# Patient Record
Sex: FEMALE | Race: WHITE | NOT HISPANIC OR LATINO | Employment: UNEMPLOYED | URBAN - NONMETROPOLITAN AREA
[De-identification: names, ages, dates, MRNs, and addresses within clinical notes are randomized per-mention and may not be internally consistent; named-entity substitution may affect disease eponyms.]

---
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
## 2020-01-03 ENCOUNTER — PRENATAL OFFICE VISIT (OUTPATIENT)
Dept: OBGYN | Facility: OTHER | Age: 20
End: 2020-01-03
Attending: OBSTETRICS & GYNECOLOGY
Payer: COMMERCIAL

## 2020-01-03 VITALS
SYSTOLIC BLOOD PRESSURE: 120 MMHG | DIASTOLIC BLOOD PRESSURE: 60 MMHG | BODY MASS INDEX: 30.67 KG/M2 | WEIGHT: 165 LBS | HEART RATE: 76 BPM

## 2020-01-03 DIAGNOSIS — Z34.03 SUPERVISION OF NORMAL FIRST TEEN PREGNANCY IN THIRD TRIMESTER: Primary | ICD-10-CM

## 2020-01-03 PROCEDURE — 87081 CULTURE SCREEN ONLY: CPT | Mod: ZL | Performed by: OBSTETRICS & GYNECOLOGY

## 2020-01-03 PROCEDURE — 99207 ZZC OB VISIT-NO CHARGE - GICH ONLY: CPT | Performed by: OBSTETRICS & GYNECOLOGY

## 2020-01-03 ASSESSMENT — PAIN SCALES - GENERAL: PAINLEVEL: NO PAIN (0)

## 2020-01-03 NOTE — NURSING NOTE
Chief Complaint   Patient presents with     Prenatal Care     36w4D        Medication Reconciliation: completed   Nancy Cortes LPN  1/3/2020 3:23 PM

## 2020-01-03 NOTE — PROGRESS NOTES
Return OB Visit    S: Patient is feeling well. No ctx, VB or LOF. +FM    O: /60 (BP Location: Right arm, Patient Position: Sitting, Cuff Size: Adult Regular)   Pulse 76   Wt 74.8 kg (165 lb)   LMP 2019 (Exact Date)   Breastfeeding No   BMI 30.67 kg/m    Gen: Well-appearing, NAD  See OB Flowsheet    A/P:  Bebe Carl is a 19 year old  at 36w4d by LMP c/w 8w4d US, here for return OB visit.  Bipolar: mood stable, off meds  Trichomonas: s/p treatment  Plans breastfeeding, epidural, Nexplanon  Nausea: zofran     PNC:  Rh positive, Rubella equivocal,   Genetics: normal CF screen. 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 Declines genetic counseling referral. Quad screen normal   Imaging: dating US at 8w4d, anatomy survey normal  Immunizations: s/p flu, Tdap  RTC weekly until delivery    Mandie Shook MD  OB/GYN  1/3/2020 3:21 PM      Danish Pinto

## 2020-01-06 NOTE — DISCHARGE SUMMARY
[facility] Labor and Delivery   Discharge Summary      Bebe Carl MRN# [id number]   Age: 19 year old YOB: 2000      Date of Admission:                      2019     Primary care provider: SAMEER Shook           Chief Complaint:   Bebe Carl is a 19 year old  at 35w1d admitted for vomiting, dehydration, fevers, and contractions.           Physical Exam:      Patient Vitals for the past 8 hrs:    BP Temp Temp src Pulse Resp SpO2   19 0231 110/58 99  F (37.2  C) Temporal 110 16 --   19 0230 -- -- -- -- -- 98 %      Gen: alert, in no acute distress  CV: RRR, no murmurs  Resp: clear to auscultation bilaterally  Abd: gravid, non-tender  Ext: trace edema bilaterally      Cervix: 0-1/0-10%/posterior (per RN)  Membranes: intact     Fetal Heart Rate Tracing: variables absent, Tier 1 (normal)  Tocometer: external monitor- initially contractions q3-5, by the morning, they had dissipated          Assessment/Plan:   Bebe Carl is a 19 year old  at 35w1d admitted with acute gastroenteritis and dehydration. Category I strip. Intact membranes. Occasional ctx, no cervical dilation. GBS -.      -IV fluid rehydration  -IV Zofran  -tylenol prn for fever  -observe overnight     By morning, she felt much better. She had sufficient urine output and tolerated a regular breakfast. Ctx stopped. She was prescribed PO Zofran prn for home and her OB clinic appointment for today was rescheduled for next week.      Carmen Hou MD  Family Practice/OB

## 2020-01-07 LAB
BACTERIA SPEC CULT: NORMAL
SPECIMEN SOURCE: NORMAL

## 2020-01-08 ENCOUNTER — PRENATAL OFFICE VISIT (OUTPATIENT)
Dept: OBGYN | Facility: OTHER | Age: 20
End: 2020-01-08
Attending: OBSTETRICS & GYNECOLOGY
Payer: COMMERCIAL

## 2020-01-08 VITALS
HEART RATE: 82 BPM | WEIGHT: 167.8 LBS | BODY MASS INDEX: 31.19 KG/M2 | DIASTOLIC BLOOD PRESSURE: 62 MMHG | SYSTOLIC BLOOD PRESSURE: 128 MMHG

## 2020-01-08 DIAGNOSIS — Z34.03 SUPERVISION OF NORMAL FIRST TEEN PREGNANCY IN THIRD TRIMESTER: Primary | ICD-10-CM

## 2020-01-08 PROCEDURE — G0463 HOSPITAL OUTPT CLINIC VISIT: HCPCS

## 2020-01-08 PROCEDURE — 99207 ZZC OB VISIT-NO CHARGE - GICH ONLY: CPT | Performed by: OBSTETRICS & GYNECOLOGY

## 2020-01-08 ASSESSMENT — PAIN SCALES - GENERAL: PAINLEVEL: NO PAIN (0)

## 2020-01-08 NOTE — NURSING NOTE
Pt presents for OB at 37W2D. No concerns noted. Lizandro Millan RN, BSN  ....................  1/8/2020   3:24 PM

## 2020-01-08 NOTE — PROGRESS NOTES
Return OB Visit    S: Patient doing well. No ctx, VB or LOF. +FM.    O: /62 (BP Location: Right arm, Patient Position: Sitting, Cuff Size: Adult Regular)   Pulse 82   Wt 76.1 kg (167 lb 12.8 oz)   LMP 2019 (Exact Date)   BMI 31.19 kg/m    Gen: Well-appearing, NAD  See OB Flowsheet    A/P:  Bebe Carl is a 19 year old  at 37w2d by LMP c/w 8w4d US, here for return OB visit.  Bipolar: mood stable, off meds  Trichomonas: s/p treatment  Plans breastfeeding, epidural, Nexplanon  Nausea: zofran     PNC:  Rh positive, Rubella equivocal,   Genetics: normal CF screen. 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 Declines genetic counseling referral. Quad screen normal   Imaging: dating US at 8w4d, anatomy survey normal  Immunizations: s/p flu, Tdap  RTC weekly until delivery    Mandie Shook MD  OB/GYN  2020 3:48 PM

## 2020-01-14 ENCOUNTER — PRENATAL OFFICE VISIT (OUTPATIENT)
Dept: OBGYN | Facility: OTHER | Age: 20
End: 2020-01-14
Attending: OBSTETRICS & GYNECOLOGY
Payer: COMMERCIAL

## 2020-01-14 VITALS
WEIGHT: 167.3 LBS | BODY MASS INDEX: 31.1 KG/M2 | DIASTOLIC BLOOD PRESSURE: 86 MMHG | HEART RATE: 88 BPM | SYSTOLIC BLOOD PRESSURE: 126 MMHG

## 2020-01-14 DIAGNOSIS — Z34.90 NORMAL PREGNANCY, ANTEPARTUM: Primary | ICD-10-CM

## 2020-01-14 PROCEDURE — 99207 ZZC OB VISIT-NO CHARGE - GICH ONLY: CPT | Performed by: OBSTETRICS & GYNECOLOGY

## 2020-01-14 ASSESSMENT — PAIN SCALES - GENERAL: PAINLEVEL: NO PAIN (0)

## 2020-01-14 NOTE — PROGRESS NOTES
CC: Recheck OB visit at 38w1d    HPI: Bebe Carl presents for a routine OB visit now at 38w1d  She has no concerns. Denies cramping, bleeding, normal fetal movement    OB History    Para Term  AB Living   1 0 0 0 0 0   SAB TAB Ectopic Multiple Live Births   0 0 0 0 0      # Outcome Date GA Lbr Alistair/2nd Weight Sex Delivery Anes PTL Lv   1 Current              Current Outpatient Medications   Medication     acetaminophen (TYLENOL) 325 MG tablet     ferrous sulfate (FEROSUL) 325 (65 Fe) MG tablet     ondansetron (ZOFRAN) 4 MG tablet     Prenatal Vit-Fe Fumarate-FA (PRENATAL MULTIVITAMIN W/IRON) 27-0.8 MG tablet     No current facility-administered medications for this visit.          O: /86 (BP Location: Right arm)   Pulse 88   Wt 75.9 kg (167 lb 4.8 oz)   LMP 2019 (Exact Date)   BMI 31.10 kg/m    Body mass index is 31.1 kg/m .  See OB flow sheet  EXAM:  NAD  EFW 8 lbs.  FHT: 140 bpm  Declines cx exam    No results found for any visits on 20.    A/P: 38w1d gestation      Bipolar: mood stable, off meds  Trichomonas: s/p treatment  Plans breastfeeding, epidural, Nexplanon  Nausea: zofran     PNC:  Rh positive, Rubella equivocal,   Genetics: normal CF screen. 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 Declines genetic counseling referral. Quad screen normal   Imaging: dating US at 8w4d, anatomy survey normal  Immunizations: s/p flu, Tdap  RTC weekly until delivery    Lucas Mohamud MD FACOG  3:43 PM 2020

## 2020-01-22 ENCOUNTER — PRENATAL OFFICE VISIT (OUTPATIENT)
Dept: OBGYN | Facility: OTHER | Age: 20
End: 2020-01-22
Attending: OBSTETRICS & GYNECOLOGY
Payer: COMMERCIAL

## 2020-01-22 VITALS
WEIGHT: 167.3 LBS | OXYGEN SATURATION: 98 % | DIASTOLIC BLOOD PRESSURE: 70 MMHG | BODY MASS INDEX: 31.1 KG/M2 | SYSTOLIC BLOOD PRESSURE: 118 MMHG

## 2020-01-22 DIAGNOSIS — Z34.03 SUPERVISION OF NORMAL FIRST TEEN PREGNANCY IN THIRD TRIMESTER: Primary | ICD-10-CM

## 2020-01-22 PROCEDURE — 99207 ZZC OB VISIT-NO CHARGE - GICH ONLY: CPT | Performed by: OBSTETRICS & GYNECOLOGY

## 2020-01-22 ASSESSMENT — PAIN SCALES - GENERAL: PAINLEVEL: NO PAIN (0)

## 2020-01-22 NOTE — NURSING NOTE
"Chief Complaint   Patient presents with     Prenatal Care     39w2d       Initial /70   Wt 75.9 kg (167 lb 4.8 oz)   LMP 04/22/2019 (Exact Date)   SpO2 98%   Breastfeeding No   BMI 31.10 kg/m   Estimated body mass index is 31.1 kg/m  as calculated from the following:    Height as of 12/22/19: 1.562 m (5' 1.5\").    Weight as of this encounter: 75.9 kg (167 lb 4.8 oz).  Medication Reconciliation: complete    Nanette Pinto, LIZ  "

## 2020-01-22 NOTE — PROGRESS NOTES
Return OB Visit    S: Patient is having some questions. No VB or LOF. +FM    O: /70   Wt 75.9 kg (167 lb 4.8 oz)   LMP 2019 (Exact Date)   SpO2 98%   Breastfeeding No   BMI 31.10 kg/m    Gen: Well-appearing, NAD  See OB Flowsheet    A/P:  Bebe Carl is a 19 year old  at 39w2d by LMP c/w 8w4d US, here for return OB visit.  Bipolar: mood stable, off meds  Trichomonas: s/p treatment  Plans breastfeeding, epidural, Nexplanon  Nausea: zofran     PNC:  Rh positive, Rubella equivocal,   Genetics: normal CF screen. 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 Declines genetic counseling referral. Quad screen normal   Imaging: dating US at 8w4d, anatomy survey normal  Immunizations: s/p flu, Tdap  RTC weekly until delivery    Mandie Shook MD  OB/GYN  2020 3:43 PM

## 2020-01-26 ENCOUNTER — ANESTHESIA (OUTPATIENT)
Dept: OBGYN | Facility: OTHER | Age: 20
End: 2020-01-26

## 2020-01-26 ENCOUNTER — ANESTHESIA EVENT (OUTPATIENT)
Dept: OBGYN | Facility: OTHER | Age: 20
End: 2020-01-26
Payer: COMMERCIAL

## 2020-01-26 ENCOUNTER — ANESTHESIA EVENT (OUTPATIENT)
Dept: OBGYN | Facility: OTHER | Age: 20
End: 2020-01-26

## 2020-01-26 ENCOUNTER — ANESTHESIA (OUTPATIENT)
Dept: OBGYN | Facility: OTHER | Age: 20
End: 2020-01-26
Payer: COMMERCIAL

## 2020-01-26 ENCOUNTER — HOSPITAL ENCOUNTER (INPATIENT)
Facility: OTHER | Age: 20
LOS: 2 days | Discharge: HOME | End: 2020-01-28
Attending: OBSTETRICS & GYNECOLOGY | Admitting: OBSTETRICS & GYNECOLOGY
Payer: COMMERCIAL

## 2020-01-26 DIAGNOSIS — D62 ANEMIA DUE TO BLOOD LOSS, ACUTE: Primary | ICD-10-CM

## 2020-01-26 PROBLEM — Z36.89 ENCOUNTER FOR TRIAGE IN PREGNANT PATIENT: Status: ACTIVE | Noted: 2019-10-01

## 2020-01-26 LAB
A1 MICROGLOB PLACENTAL VAG QL: POSITIVE
ALBUMIN UR-MCNC: NEGATIVE MG/DL
AMPHETAMINES UR QL SCN: NOT DETECTED
APPEARANCE UR: CLEAR
BACTERIA #/AREA URNS HPF: ABNORMAL /HPF
BARBITURATES UR QL: NOT DETECTED
BASOPHILS # BLD AUTO: 0 10E9/L (ref 0–0.2)
BASOPHILS NFR BLD AUTO: 0.2 %
BENZODIAZ UR QL: NOT DETECTED
BILIRUB UR QL STRIP: NEGATIVE
BUPRENORPHINE UR QL: NOT DETECTED NG/ML
CANNABINOIDS UR QL: NOT DETECTED NG/ML
COCAINE UR QL: NOT DETECTED
COLOR UR AUTO: YELLOW
D-METHAMPHET UR QL: NOT DETECTED NG/ML
DIFFERENTIAL METHOD BLD: ABNORMAL
EOSINOPHIL # BLD AUTO: 0.2 10E9/L (ref 0–0.7)
EOSINOPHIL NFR BLD AUTO: 1.2 %
ERYTHROCYTE [DISTWIDTH] IN BLOOD BY AUTOMATED COUNT: 13.4 % (ref 10–15)
ERYTHROCYTE [DISTWIDTH] IN BLOOD BY AUTOMATED COUNT: 13.5 % (ref 10–15)
GLUCOSE UR STRIP-MCNC: NEGATIVE MG/DL
HCT VFR BLD AUTO: 27.3 % (ref 35–47)
HCT VFR BLD AUTO: 33.9 % (ref 35–47)
HGB BLD-MCNC: 11.9 G/DL (ref 11.7–15.7)
HGB BLD-MCNC: 9.2 G/DL (ref 11.7–15.7)
HGB UR QL STRIP: ABNORMAL
IMM GRANULOCYTES # BLD: 0.1 10E9/L (ref 0–0.4)
IMM GRANULOCYTES NFR BLD: 0.4 %
KETONES UR STRIP-MCNC: NEGATIVE MG/DL
LEUKOCYTE ESTERASE UR QL STRIP: ABNORMAL
LYMPHOCYTES # BLD AUTO: 1.5 10E9/L (ref 0.8–5.3)
LYMPHOCYTES NFR BLD AUTO: 8.6 %
MCH RBC QN AUTO: 31 PG (ref 26.5–33)
MCH RBC QN AUTO: 31.6 PG (ref 26.5–33)
MCHC RBC AUTO-ENTMCNC: 33.7 G/DL (ref 31.5–36.5)
MCHC RBC AUTO-ENTMCNC: 35.1 G/DL (ref 31.5–36.5)
MCV RBC AUTO: 90 FL (ref 78–100)
MCV RBC AUTO: 92 FL (ref 78–100)
METHADONE UR QL SCN: NOT DETECTED
MONOCYTES # BLD AUTO: 0.8 10E9/L (ref 0–1.3)
MONOCYTES NFR BLD AUTO: 4.4 %
MUCOUS THREADS #/AREA URNS LPF: PRESENT /LPF
NEUTROPHILS # BLD AUTO: 14.4 10E9/L (ref 1.6–8.3)
NEUTROPHILS NFR BLD AUTO: 85.2 %
NITRATE UR QL: NEGATIVE
OPIATES UR QL SCN: NOT DETECTED
OXYCODONE UR QL: NOT DETECTED NG/ML
PCP UR QL SCN: NOT DETECTED
PH UR STRIP: 6.5 PH (ref 5–7)
PLATELET # BLD AUTO: 275 10E9/L (ref 150–450)
PLATELET # BLD AUTO: 309 10E9/L (ref 150–450)
PROPOXYPH UR QL: NOT DETECTED NG/ML
RBC # BLD AUTO: 2.97 10E12/L (ref 3.8–5.2)
RBC # BLD AUTO: 3.77 10E12/L (ref 3.8–5.2)
RBC #/AREA URNS AUTO: >182 /HPF
SOURCE: ABNORMAL
SP GR UR STRIP: 1.01 (ref 1–1.03)
SQUAMOUS #/AREA URNS AUTO: 8 /HPF (ref 0–1)
TRICYCLICS UR QL SCN: NOT DETECTED NG/ML
UROBILINOGEN UR STRIP-MCNC: NORMAL MG/DL (ref 0–2)
WBC # BLD AUTO: 17 10E9/L (ref 4–11)
WBC # BLD AUTO: 25.4 10E9/L (ref 4–11)
WBC #/AREA URNS AUTO: 6 /HPF

## 2020-01-26 PROCEDURE — 86901 BLOOD TYPING SEROLOGIC RH(D): CPT | Performed by: OBSTETRICS & GYNECOLOGY

## 2020-01-26 PROCEDURE — 80307 DRUG TEST PRSMV CHEM ANLYZR: CPT | Performed by: OBSTETRICS & GYNECOLOGY

## 2020-01-26 PROCEDURE — 25000125 ZZHC RX 250: Performed by: OBSTETRICS & GYNECOLOGY

## 2020-01-26 PROCEDURE — 25000128 H RX IP 250 OP 636: Performed by: NURSE ANESTHETIST, CERTIFIED REGISTERED

## 2020-01-26 PROCEDURE — 25000132 ZZH RX MED GY IP 250 OP 250 PS 637: Performed by: OBSTETRICS & GYNECOLOGY

## 2020-01-26 PROCEDURE — 84112 EVAL AMNIOTIC FLUID PROTEIN: CPT | Performed by: OBSTETRICS & GYNECOLOGY

## 2020-01-26 PROCEDURE — 25000125 ZZHC RX 250: Performed by: NURSE ANESTHETIST, CERTIFIED REGISTERED

## 2020-01-26 PROCEDURE — 0KQM0ZZ REPAIR PERINEUM MUSCLE, OPEN APPROACH: ICD-10-PCS | Performed by: OBSTETRICS & GYNECOLOGY

## 2020-01-26 PROCEDURE — 25000132 ZZH RX MED GY IP 250 OP 250 PS 637: Performed by: NURSE ANESTHETIST, CERTIFIED REGISTERED

## 2020-01-26 PROCEDURE — 25800030 ZZH RX IP 258 OP 636: Performed by: OBSTETRICS & GYNECOLOGY

## 2020-01-26 PROCEDURE — 37000011 ZZH ANESTHESIA WARD SERVICE

## 2020-01-26 PROCEDURE — 85027 COMPLETE CBC AUTOMATED: CPT | Performed by: OBSTETRICS & GYNECOLOGY

## 2020-01-26 PROCEDURE — 86920 COMPATIBILITY TEST SPIN: CPT | Performed by: OBSTETRICS & GYNECOLOGY

## 2020-01-26 PROCEDURE — 86780 TREPONEMA PALLIDUM: CPT | Performed by: OBSTETRICS & GYNECOLOGY

## 2020-01-26 PROCEDURE — 25000128 H RX IP 250 OP 636: Performed by: OBSTETRICS & GYNECOLOGY

## 2020-01-26 PROCEDURE — 81001 URINALYSIS AUTO W/SCOPE: CPT | Performed by: OBSTETRICS & GYNECOLOGY

## 2020-01-26 PROCEDURE — G0463 HOSPITAL OUTPT CLINIC VISIT: HCPCS | Mod: 25

## 2020-01-26 PROCEDURE — 86850 RBC ANTIBODY SCREEN: CPT | Performed by: OBSTETRICS & GYNECOLOGY

## 2020-01-26 PROCEDURE — 85025 COMPLETE CBC W/AUTO DIFF WBC: CPT | Performed by: OBSTETRICS & GYNECOLOGY

## 2020-01-26 PROCEDURE — 25800030 ZZH RX IP 258 OP 636: Performed by: NURSE ANESTHETIST, CERTIFIED REGISTERED

## 2020-01-26 PROCEDURE — 36415 COLL VENOUS BLD VENIPUNCTURE: CPT | Performed by: OBSTETRICS & GYNECOLOGY

## 2020-01-26 PROCEDURE — 59400 OBSTETRICAL CARE: CPT | Performed by: OBSTETRICS & GYNECOLOGY

## 2020-01-26 PROCEDURE — 12000000 ZZH R&B MED SURG/OB

## 2020-01-26 PROCEDURE — 86900 BLOOD TYPING SEROLOGIC ABO: CPT | Performed by: OBSTETRICS & GYNECOLOGY

## 2020-01-26 RX ORDER — OXYTOCIN 10 [USP'U]/ML
10 INJECTION, SOLUTION INTRAMUSCULAR; INTRAVENOUS
Status: DISCONTINUED | OUTPATIENT
Start: 2020-01-26 | End: 2020-01-28 | Stop reason: HOSPADM

## 2020-01-26 RX ORDER — CARBOPROST TROMETHAMINE 250 UG/ML
250 INJECTION, SOLUTION INTRAMUSCULAR
Status: DISCONTINUED | OUTPATIENT
Start: 2020-01-26 | End: 2020-01-28 | Stop reason: HOSPADM

## 2020-01-26 RX ORDER — NALOXONE HYDROCHLORIDE 0.4 MG/ML
.1-.4 INJECTION, SOLUTION INTRAMUSCULAR; INTRAVENOUS; SUBCUTANEOUS
Status: DISCONTINUED | OUTPATIENT
Start: 2020-01-26 | End: 2020-01-28 | Stop reason: HOSPADM

## 2020-01-26 RX ORDER — SODIUM CHLORIDE, SODIUM LACTATE, POTASSIUM CHLORIDE, CALCIUM CHLORIDE 600; 310; 30; 20 MG/100ML; MG/100ML; MG/100ML; MG/100ML
INJECTION, SOLUTION INTRAVENOUS CONTINUOUS
Status: DISCONTINUED | OUTPATIENT
Start: 2020-01-26 | End: 2020-01-28 | Stop reason: HOSPADM

## 2020-01-26 RX ORDER — BISACODYL 10 MG
10 SUPPOSITORY, RECTAL RECTAL DAILY PRN
Status: DISCONTINUED | OUTPATIENT
Start: 2020-01-28 | End: 2020-01-28 | Stop reason: HOSPADM

## 2020-01-26 RX ORDER — IBUPROFEN 400 MG/1
800 TABLET, FILM COATED ORAL EVERY 6 HOURS PRN
Status: DISCONTINUED | OUTPATIENT
Start: 2020-01-26 | End: 2020-01-28 | Stop reason: HOSPADM

## 2020-01-26 RX ORDER — NALOXONE HYDROCHLORIDE 0.4 MG/ML
.1-.4 INJECTION, SOLUTION INTRAMUSCULAR; INTRAVENOUS; SUBCUTANEOUS
Status: DISCONTINUED | OUTPATIENT
Start: 2020-01-26 | End: 2020-01-26

## 2020-01-26 RX ORDER — METHYLERGONOVINE MALEATE 0.2 MG/ML
200 INJECTION INTRAVENOUS
Status: DISCONTINUED | OUTPATIENT
Start: 2020-01-26 | End: 2020-01-28 | Stop reason: HOSPADM

## 2020-01-26 RX ORDER — LIDOCAINE HYDROCHLORIDE AND EPINEPHRINE 15; 5 MG/ML; UG/ML
3 INJECTION, SOLUTION EPIDURAL
Status: COMPLETED | OUTPATIENT
Start: 2020-01-26 | End: 2020-01-26

## 2020-01-26 RX ORDER — CITRIC ACID/SODIUM CITRATE 334-500MG
30 SOLUTION, ORAL ORAL ONCE
Status: COMPLETED | OUTPATIENT
Start: 2020-01-26 | End: 2020-01-26

## 2020-01-26 RX ORDER — NALOXONE HYDROCHLORIDE 0.4 MG/ML
.1-.4 INJECTION, SOLUTION INTRAMUSCULAR; INTRAVENOUS; SUBCUTANEOUS
Status: DISCONTINUED | OUTPATIENT
Start: 2020-01-26 | End: 2020-01-28

## 2020-01-26 RX ORDER — ACETAMINOPHEN 325 MG/1
650 TABLET ORAL EVERY 4 HOURS PRN
Status: DISCONTINUED | OUTPATIENT
Start: 2020-01-26 | End: 2020-01-28 | Stop reason: HOSPADM

## 2020-01-26 RX ORDER — LIDOCAINE HYDROCHLORIDE 10 MG/ML
INJECTION, SOLUTION INFILTRATION; PERINEURAL PRN
Status: DISCONTINUED | OUTPATIENT
Start: 2020-01-26 | End: 2020-01-26

## 2020-01-26 RX ORDER — FENTANYL/BUPIVACAINE/NS/PF 2-1250MCG
10 PLASTIC BAG, INJECTION (ML) INJECTION CONTINUOUS
Status: DISCONTINUED | OUTPATIENT
Start: 2020-01-26 | End: 2020-01-28 | Stop reason: HOSPADM

## 2020-01-26 RX ORDER — NALBUPHINE HYDROCHLORIDE 10 MG/ML
2.5-5 INJECTION, SOLUTION INTRAMUSCULAR; INTRAVENOUS; SUBCUTANEOUS EVERY 6 HOURS PRN
Status: DISCONTINUED | OUTPATIENT
Start: 2020-01-26 | End: 2020-01-28 | Stop reason: HOSPADM

## 2020-01-26 RX ORDER — IBUPROFEN 400 MG/1
800 TABLET, FILM COATED ORAL
Status: DISCONTINUED | OUTPATIENT
Start: 2020-01-26 | End: 2020-01-28 | Stop reason: HOSPADM

## 2020-01-26 RX ORDER — PHENYLEPHRINE HCL IN 0.9% NACL 1 MG/10 ML
100 SYRINGE (ML) INTRAVENOUS EVERY 5 MIN PRN
Status: DISCONTINUED | OUTPATIENT
Start: 2020-01-26 | End: 2020-01-28 | Stop reason: HOSPADM

## 2020-01-26 RX ORDER — HYDROCORTISONE 2.5 %
CREAM (GRAM) TOPICAL 3 TIMES DAILY PRN
Status: DISCONTINUED | OUTPATIENT
Start: 2020-01-26 | End: 2020-01-28 | Stop reason: HOSPADM

## 2020-01-26 RX ORDER — DOCUSATE SODIUM 100 MG/1
100 CAPSULE, LIQUID FILLED ORAL 2 TIMES DAILY
Status: DISCONTINUED | OUTPATIENT
Start: 2020-01-26 | End: 2020-01-28 | Stop reason: HOSPADM

## 2020-01-26 RX ORDER — LANOLIN 100 %
OINTMENT (GRAM) TOPICAL
Status: DISCONTINUED | OUTPATIENT
Start: 2020-01-26 | End: 2020-01-28 | Stop reason: HOSPADM

## 2020-01-26 RX ORDER — ACETAMINOPHEN 325 MG/1
650 TABLET ORAL EVERY 4 HOURS PRN
Status: DISCONTINUED | OUTPATIENT
Start: 2020-01-26 | End: 2020-01-28

## 2020-01-26 RX ORDER — OXYCODONE AND ACETAMINOPHEN 5; 325 MG/1; MG/1
1 TABLET ORAL
Status: COMPLETED | OUTPATIENT
Start: 2020-01-26 | End: 2020-01-26

## 2020-01-26 RX ORDER — FENTANYL CITRATE 50 UG/ML
INJECTION, SOLUTION INTRAMUSCULAR; INTRAVENOUS PRN
Status: DISCONTINUED | OUTPATIENT
Start: 2020-01-26 | End: 2020-01-26

## 2020-01-26 RX ORDER — NALBUPHINE HYDROCHLORIDE 10 MG/ML
5 INJECTION, SOLUTION INTRAMUSCULAR; INTRAVENOUS; SUBCUTANEOUS ONCE
Status: COMPLETED | OUTPATIENT
Start: 2020-01-26 | End: 2020-01-26

## 2020-01-26 RX ORDER — ONDANSETRON 2 MG/ML
4 INJECTION INTRAMUSCULAR; INTRAVENOUS EVERY 6 HOURS PRN
Status: DISCONTINUED | OUTPATIENT
Start: 2020-01-26 | End: 2020-01-28 | Stop reason: HOSPADM

## 2020-01-26 RX ADMIN — FENTANYL CITRATE 50 MCG: 50 INJECTION, SOLUTION INTRAMUSCULAR; INTRAVENOUS at 16:53

## 2020-01-26 RX ADMIN — LIDOCAINE HYDROCHLORIDE 3 MG: 10 INJECTION, SOLUTION INFILTRATION; PERINEURAL at 07:28

## 2020-01-26 RX ADMIN — Medication 10 ML/HR: at 07:40

## 2020-01-26 RX ADMIN — NALBUPHINE HYDROCHLORIDE 5 MG: 10 INJECTION, SOLUTION INTRAMUSCULAR; INTRAVENOUS; SUBCUTANEOUS at 05:24

## 2020-01-26 RX ADMIN — FENTANYL CITRATE 50 MCG: 50 INJECTION, SOLUTION INTRAMUSCULAR; INTRAVENOUS at 12:58

## 2020-01-26 RX ADMIN — Medication 10 ML/HR: at 13:48

## 2020-01-26 RX ADMIN — Medication 5 ML: at 13:04

## 2020-01-26 RX ADMIN — LIDOCAINE HYDROCHLORIDE AND EPINEPHRINE 5 ML: 15; 5 INJECTION, SOLUTION EPIDURAL at 07:33

## 2020-01-26 RX ADMIN — SODIUM CITRATE AND CITRIC ACID MONOHYDRATE 30 ML: 500; 334 SOLUTION ORAL at 07:37

## 2020-01-26 RX ADMIN — LIDOCAINE HYDROCHLORIDE 30 ML: 10 INJECTION, SOLUTION EPIDURAL; INFILTRATION; INTRACAUDAL; PERINEURAL at 16:50

## 2020-01-26 RX ADMIN — Medication 5 ML: at 12:59

## 2020-01-26 RX ADMIN — IBUPROFEN 800 MG: 400 TABLET, FILM COATED ORAL at 20:44

## 2020-01-26 RX ADMIN — SODIUM CHLORIDE, POTASSIUM CHLORIDE, SODIUM LACTATE AND CALCIUM CHLORIDE: 600; 310; 30; 20 INJECTION, SOLUTION INTRAVENOUS at 07:43

## 2020-01-26 RX ADMIN — DOCUSATE SODIUM 100 MG: 100 CAPSULE, LIQUID FILLED ORAL at 20:44

## 2020-01-26 RX ADMIN — Medication 5 ML: at 16:54

## 2020-01-26 RX ADMIN — OXYCODONE AND ACETAMINOPHEN 1 TABLET: 5; 325 TABLET ORAL at 22:03

## 2020-01-26 RX ADMIN — Medication 5 ML: at 16:58

## 2020-01-26 RX ADMIN — Medication 2 MILLI-UNITS/MIN: at 10:10

## 2020-01-26 RX ADMIN — Medication 5 ML: at 17:02

## 2020-01-26 RX ADMIN — SODIUM CHLORIDE, POTASSIUM CHLORIDE, SODIUM LACTATE AND CALCIUM CHLORIDE 1000 ML: 600; 310; 30; 20 INJECTION, SOLUTION INTRAVENOUS at 06:10

## 2020-01-26 NOTE — ANESTHESIA PROCEDURE NOTES
Peripheral nerve/Neuraxial procedure note : epidural catheter  Pre-Procedure  Performed by  Lindsey Reina APRN CRNA   Location: OB    Procedure Times:1/26/2020 7:18 AM and 1/26/2020 7:33 AM  Pre-Anesthestic Checklist: patient identified, IV checked, risks and benefits discussed, informed consent, monitors and equipment checked, pre-op evaluation, at physician/surgeon's request and post-op pain management    Timeout  Correct Patient: Yes   Correct Procedure: Yes   Correct Site: Yes   Correct Laterality: N/A   Correct Position: Yes   Site Marked: N/A   .   Procedure Documentation    Diagnosis:Labor Pain.    Procedure: epidural catheter, .   Patient Position:sitting Insertion Site:L2-3  (midline approach) Injection technique: LORT air   Local skin infiltrated with 3 mL of 1% lidocaine.  JOY at 6 cm    Patient Prep/Sterile Barriers; mask, sterile gloves, chlorhexidine gluconate and isopropyl alcohol, patient draped.  .  Needle: Touhy needle   Needle Gauge: 20.    Needle Length (Inches) 3.5   # of attempts: 1 and # of redirects:  .    Catheter: 20 G . .  Catheter threaded easily  6 cm epidural space.  12 cm at skin.   .    Assessment/Narrative  Paresthesias: No.  .  .  Aspiration negative for heme or CSF  . Test dose of 5 mL lidocaine 1.5% w/ 1:200,000 epinephrine at 07:33.  Test dose negative for signs of intravascular, subdural or intrathecal injection.

## 2020-01-26 NOTE — L&D DELIVERY NOTE
Delivery Note  Second stage > 2.5 hours    Slow steady progress over the remainder of the second stage. Effort improved.   over a perineum with a pre-existing midline tear/evulsion of hymen; very edematous tissues  Infant female weight 9#, with good apgars  Dr. Cortez asked to be in attendance  Placenta spontaneous and appears intact.  A long second degree tear present, detention to cervix.  Down to capsule, but capsule is intact. Cervix and fornices intact as best I can tell  Epidural dosed up plus lidocaine local  Repaired in 3 layers with 3-0 vicryl     cc

## 2020-01-26 NOTE — ANESTHESIA PREPROCEDURE EVALUATION
Anesthesia Pre-Procedure Evaluation    Patient: Bebe Carl   MRN: [id number] : 2000          Preoperative Diagnosis: * No pre-op diagnosis entered *    * No procedures listed *    Past Medical History:   Diagnosis Date     Acute gastroenteritis 2019     Bipolar 1 disorder (H)      Viral gastroenteritis 2019     Past Surgical History:   Procedure Laterality Date     APPENDECTOMY         Anesthesia Evaluation     . Pt has had prior anesthetic.     No history of anesthetic complications          ROS/MED HX    ENT/Pulmonary:  - neg pulmonary ROS     Neurologic:  - neg neurologic ROS     Cardiovascular:  - neg cardiovascular ROS       METS/Exercise Tolerance:  >4 METS   Hematologic:  - neg hematologic  ROS       Musculoskeletal:  - neg musculoskeletal ROS       GI/Hepatic:     (+) GERD       Renal/Genitourinary:  - ROS Renal section negative       Endo:  - neg endo ROS       Psychiatric:         Infectious Disease:  - neg infectious disease ROS       Malignancy:      - no malignancy   Other:    (+) Possibly pregnant H/O chronic opiod use ,                    neg OB ROS            Physical Exam  Normal systems: cardiovascular, pulmonary and dental    Airway   Mallampati: II  TM distance: >3 FB  Neck ROM: full    Dental     Cardiovascular   Rhythm and rate: regular and normal      Pulmonary    breath sounds clear to auscultation            Lab Results   Component Value Date    WBC 17.0 (H) 2020    HGB 11.9 2020    HCT 33.9 (L) 2020     2020     2019    POTASSIUM 3.7 2019    CHLORIDE 103 2019    CO2 18 (L) 2019    BUN 5 (L) 2019    CR 0.64 2019    GLC 88 2019    NILSON 8.5 (L) 2019    MAG 1.8 (L) 2019    ALBUMIN 3.4 (L) 2019    PROTTOTAL 6.0 (L) 2019    ALT 6 (L) 2019    AST 13 2019    ALKPHOS 71 2019    BILITOTAL 0.8 2019    BILIDIRECT 0.39 (H) 2016    HCG Positive (A)  "05/20/2019       Preop Vitals  BP Readings from Last 3 Encounters:   01/26/20 (!) 134/91   01/22/20 118/70   01/14/20 126/86    Pulse Readings from Last 3 Encounters:   12/24/19 105   01/14/20 88   01/08/20 82      Resp Readings from Last 3 Encounters:   12/24/19 18   12/24/19 16   12/22/19 18    SpO2 Readings from Last 3 Encounters:   01/22/20 98%   12/24/19 98%   12/22/19 99%      Temp Readings from Last 1 Encounters:   01/26/20 98.2  F (36.8  C)    Ht Readings from Last 1 Encounters:   12/22/19 1.562 m (5' 1.5\") (14 %)*     * Growth percentiles are based on CDC (Girls, 2-20 Years) data.      Wt Readings from Last 1 Encounters:   01/22/20 75.9 kg (167 lb 4.8 oz) (91 %)*     * Growth percentiles are based on CDC (Girls, 2-20 Years) data.    Estimated body mass index is 31.1 kg/m  as calculated from the following:    Height as of 12/22/19: 1.562 m (5' 1.5\").    Weight as of 1/22/20: 75.9 kg (167 lb 4.8 oz).       Anesthesia Plan      History & Physical Review      ASA Status:  2 .  OB Epidural Asa: 2   NPO Status:  > 6 hours    Plan for Epidural          Postoperative Care      Consents  Anesthetic plan, risks, benefits and alternatives discussed with:  Patient..                 BONNIE DAVIS CRNA  "

## 2020-01-26 NOTE — PROGRESS NOTES
Pushing about 1.5 hours.  Overall poor effort, gives up easily  Slow progress  Tissues becoming swollen  Has had spontaneous bleeding vaginally for about 15 minutes, ? Sulcus tear?  FHTs stable  Patient strongly requesting an instrumented delivery - discussed importance of doing this spontaneously

## 2020-01-26 NOTE — PROGRESS NOTES
Pt presents to B complaining of contractions that started around midnight and became much more intense around 0200. Pt thinks that she possibly ruptured membranes at [facility]. Triage orders placed. Category I tracing noted.

## 2020-01-26 NOTE — PROGRESS NOTES
Positive Amnisure.  MD notified.  Orders to admit.  Patient is roberth q2-3 min.  Category I tracing.  Requesting epidural.  Cervical change noted from 2-4 cm.  Bolus started.  Mother and stepmother in the room and supportive.

## 2020-01-26 NOTE — H&P
[facility] Labor and Delivery History and Physical    Bebe Carl MRN# [id number]   Age: 19 year old YOB: 2000     Date of Admission:  2020    Primary care provider: No Ref-Primary, Physician           Chief Complaint:   Bebe Carl is a 19 year old  at 39w6d by [facility] and US admitted for active labor management.          Pregnancy history:     OBSTETRIC HISTORY:    OB History    Para Term  AB Living   1 0 0 0 0 0   SAB TAB Ectopic Multiple Live Births   0 0 0 0 0      # Outcome Date GA Lbr Alistair/2nd Weight Sex Delivery Anes PTL Lv   1 Current                EDC: Estimated Date of Delivery: 2020    Prenatal Labs:   Lab Results   Component Value Date    ABO O 2020    RH Pos 2020    AS Neg 2020    HEPBANG Nonreactive 2019    HGB 11.9 2020       GBS Status:   No results found for: GBS    Active Problem List  Patient Active Problem List   Diagnosis     ADHD     Appendicitis with abscess     Mononucleosis     Controlled substance agreement signed     Ruptured suppurative appendicitis     Encounter for triage in pregnant patient      contractions     Acute gastroenteritis     Dehydration     Normal labor and delivery       Medication Prior to Admission  Medications Prior to Admission   Medication Sig Dispense Refill Last Dose     ferrous sulfate (FEROSUL) 325 (65 Fe) MG tablet Take 1 tablet (325 mg) by mouth daily (with breakfast) 90 tablet 1 Taking     ondansetron (ZOFRAN) 4 MG tablet Take 1 tablet (4 mg) by mouth every 6 hours as needed for nausea 20 tablet 1 Taking     Prenatal Vit-Fe Fumarate-FA (PRENATAL MULTIVITAMIN W/IRON) 27-0.8 MG tablet TAKE 1 TABLET BY MOUTH DAILY 90 tablet 0 Taking   .        Maternal Past Medical History:     Past Medical History:   Diagnosis Date     Acute gastroenteritis 2019     Bipolar 1 disorder (H)      Viral gastroenteritis 2019     Past Surgical History:    Procedure Laterality Date     APPENDECTOMY                         Family History:   This patient has no significant family history            Social History:   This patient has no significant social history         Review of Systems:   CONSTITUTIONAL: NEGATIVE for fever, chills, change in weight  ENT/MOUTH: NEGATIVE for ear, mouth and throat problems  RESP: NEGATIVE for significant cough or SOB  CV: NEGATIVE for chest pain, palpitations or peripheral edema          Physical Exam:     Patient Vitals for the past 8 hrs:   BP Temp   20 0610 (!) 134/91 --   20 0423 134/87 98.2  F (36.8  C)     Gen: AONAD, comfortable post-epidural placement  CV: RRR  Resp: CTA  Abd: gravid  Ext: neg    Cervix: 4 cm per RN  Membranes: ruptured - clear  EFW: 7.5  Presentation:Cephalic    Fetal Heart Rate Tracing: reactive and reassuring  Tocometer: external monitor        Assessment:   Bebe Carl is a 19 year old  at 39w6d admitted with active labor management.          Plan:   Anticipate     Stewart Shook MD

## 2020-01-27 LAB
ABO + RH BLD: NORMAL
ABO + RH BLD: NORMAL
BLD GP AB SCN SERPL QL: NORMAL
BLD PROD TYP BPU: NORMAL
BLD PROD TYP BPU: NORMAL
BLD UNIT ID BPU: 0
BLOOD BANK CMNT PATIENT-IMP: NORMAL
BLOOD PRODUCT CODE: NORMAL
BPU ID: NORMAL
HGB BLD-MCNC: 6.8 G/DL (ref 11.7–15.7)
HGB BLD-MCNC: 8.3 G/DL (ref 11.7–15.7)
NUM BPU REQUESTED: 1
SPECIMEN EXP DATE BLD: NORMAL
TRANSFUSION STATUS PATIENT QL: NORMAL
TRANSFUSION STATUS PATIENT QL: NORMAL

## 2020-01-27 PROCEDURE — 85018 HEMOGLOBIN: CPT | Performed by: OBSTETRICS & GYNECOLOGY

## 2020-01-27 PROCEDURE — 72200001 ZZH LABOR CARE VAGINAL DELIVERY SINGLE

## 2020-01-27 PROCEDURE — 36415 COLL VENOUS BLD VENIPUNCTURE: CPT | Performed by: OBSTETRICS & GYNECOLOGY

## 2020-01-27 PROCEDURE — 30233N1 TRANSFUSION OF NONAUTOLOGOUS RED BLOOD CELLS INTO PERIPHERAL VEIN, PERCUTANEOUS APPROACH: ICD-10-PCS | Performed by: OBSTETRICS & GYNECOLOGY

## 2020-01-27 PROCEDURE — 12000000 ZZH R&B MED SURG/OB

## 2020-01-27 PROCEDURE — 99207 ZZC NO CHARGE LOS: CPT | Performed by: OBSTETRICS & GYNECOLOGY

## 2020-01-27 PROCEDURE — 25800030 ZZH RX IP 258 OP 636: Performed by: OBSTETRICS & GYNECOLOGY

## 2020-01-27 PROCEDURE — P9016 RBC LEUKOCYTES REDUCED: HCPCS | Performed by: OBSTETRICS & GYNECOLOGY

## 2020-01-27 PROCEDURE — 25000132 ZZH RX MED GY IP 250 OP 250 PS 637: Performed by: OBSTETRICS & GYNECOLOGY

## 2020-01-27 RX ORDER — SODIUM CHLORIDE 9 MG/ML
INJECTION, SOLUTION INTRAVENOUS
Status: DISCONTINUED | OUTPATIENT
Start: 2020-01-27 | End: 2020-01-28 | Stop reason: HOSPADM

## 2020-01-27 RX ADMIN — IBUPROFEN 800 MG: 400 TABLET, FILM COATED ORAL at 09:02

## 2020-01-27 RX ADMIN — ACETAMINOPHEN 650 MG: 325 TABLET, FILM COATED ORAL at 19:27

## 2020-01-27 RX ADMIN — IBUPROFEN 800 MG: 400 TABLET, FILM COATED ORAL at 16:00

## 2020-01-27 RX ADMIN — ACETAMINOPHEN 650 MG: 325 TABLET, FILM COATED ORAL at 12:38

## 2020-01-27 RX ADMIN — IBUPROFEN 800 MG: 400 TABLET, FILM COATED ORAL at 02:53

## 2020-01-27 RX ADMIN — SODIUM CHLORIDE, POTASSIUM CHLORIDE, SODIUM LACTATE AND CALCIUM CHLORIDE 1000 ML: 600; 310; 30; 20 INJECTION, SOLUTION INTRAVENOUS at 10:44

## 2020-01-27 RX ADMIN — ACETAMINOPHEN 650 MG: 325 TABLET, FILM COATED ORAL at 06:11

## 2020-01-27 RX ADMIN — IBUPROFEN 800 MG: 400 TABLET, FILM COATED ORAL at 22:45

## 2020-01-27 RX ADMIN — DOCUSATE SODIUM 100 MG: 100 CAPSULE, LIQUID FILLED ORAL at 22:45

## 2020-01-27 RX ADMIN — DOCUSATE SODIUM 100 MG: 100 CAPSULE, LIQUID FILLED ORAL at 09:02

## 2020-01-27 NOTE — PLAN OF CARE
Assessments completed as charted. B/P: 114/68, T: 97.3, P: 76, R: 18. Rates pain: 10/10 but does not appear in distress.  States that she has the most pain when she is up ambulating.  Encouraged to get up and void, take a bath if she wants. Voiding without difficulty. Fundus: Midline and firm. Lochia: Light. Activity: moving with difficulty due to perineal pain. Infant feeding: Breast feeding going well.       Postpartum breastfeeding assessment completed and education provided, see Patient Education Activity.  Items included in the education are:   proper positioning and latch  effectiveness of feeding  manual expression  handling and storing breastmilk  maintenance of breastfeeding for the first 6 months  sign/symptoms of infant feeding issues requiring referral to qualified health care provider  Postpartum care education provided, see Patient Education activity. Patient denies needs. Will monitor.  Eric Saucedo RN

## 2020-01-27 NOTE — PLAN OF CARE
Assessments completed as charted. B/P: 120/77, T: 97.5, P: 79, R: 16. Rates pain: 5/10, reports cramping.  Warm packs provided. Voiding without difficulty. Fundus: Midline and firm. Lochia: Light. Activity: unrestricted with out pain  and supervised X1 stand by assist. Infant feeding: Breast feeding going well. Patient is exhausted, falling asleep while I'm talking to her.  Family with her at bedside helping with .  Patient is pale-gray.  Advised to call for assistance out of bed.  Call light within reach.  Hgb recheck at 0900.        Postpartum breastfeeding assessment completed and education provided, see Patient Education Activity.  Items included in the education are:   proper positioning and latch  effectiveness of feeding  manual expression  handling and storing breastmilk  maintenance of breastfeeding for the first 6 months  sign/symptoms of infant feeding issues requiring referral to qualified health care provider  Postpartum care education provided, see Patient Education activity. Patient denies needs. Will monitor.  Eric Saucedo RN

## 2020-01-27 NOTE — ANESTHESIA POSTPROCEDURE EVALUATION
Patient: Bebe Carl    * No procedures listed *    Diagnosis:* No pre-op diagnosis entered *  Diagnosis Additional Information: No value filed.    Anesthesia Type:  Epidural    Note:  Anesthesia Post Evaluation    Patient location during evaluation: Bedside (OB room 406)  Patient participation: Able to fully participate in evaluation  Level of consciousness: awake and alert  Pain management: adequate (No C/O numbness or tingling, no headache or back pain noted.)  Airway patency: patent  Cardiovascular status: acceptable  Respiratory status: acceptable  Hydration status: acceptable  PONV: none             Last vitals:  Vitals:    01/27/20 0748 01/27/20 0857 01/27/20 0859   BP: 120/77  113/59   Pulse:      Resp: 16     Temp: 97.5  F (36.4  C)  98.2  F (36.8  C)   SpO2: 98% 99%          Electronically Signed By: David Kellerman, APRN CRNA  January 27, 2020  2:34 PM

## 2020-01-27 NOTE — LACTATION NOTE
This note was copied from a baby's chart.  INPATIENT LACTATION CONSULT      Consult with Bebe and kalli regarding breastfeeding. Bebe is using a 24 mm nipple shield for difficulty with latch. Bebe states she notices obvious rooting with a strong latch during feedings.  Rhythmic and aggressive suckling also noted.  Instructed Bebe on correct positioning and technique when latching babe on.  Bebe is independent with latching babe onto breast.  Minimal assistance required.  Encouraged Bebe on the importance of frequent feedings throughout the day (at least 8-12 feedings in a 24 hour period) and skin to skin contact.  Bebe demonstrated and states she understands all information given.    Tiffanie Del Real RN, IBCLC  Lactation Consultant  [facility] and [facility]

## 2020-01-27 NOTE — PLAN OF CARE
One unit of blood infused Without complications.    Temp: 98.2  F (36.8  C) Temp src: Oral BP: 113/59 Pulse: 79   Resp: 16 SpO2: 99 %

## 2020-01-27 NOTE — PROGRESS NOTES
Feeling much better  Infant nursing well  VSS  Meléndez out  Hgb 8.3  Will gradually ambulate and see her tolerence

## 2020-01-27 NOTE — PLAN OF CARE
Problem: Bleeding (Postpartum Vaginal Delivery)  Goal: Hemostasis  Intervention: Monitor and Manage Postpartum Bleeding  Note:   Lab notified this nurse of Hbg level of 6.8.  MD notified. New order to transfuse 1 unit of blood.

## 2020-01-27 NOTE — PLAN OF CARE
Patient had SROM at approximately midnight and got an epidural for pain control when she was 4cm.  Epidural effective for pain control.  Patient had Category 1 tracing during labor.  Completely dilated at 1340.  Pushed for approximately 2.5 hours and delivered vaginally at 1640 a viable female.

## 2020-01-27 NOTE — PLAN OF CARE
Patient up out of bed, to bathroom with stand by assist, denies dizziness, ringing in ears.   Voiding without difficulty.  Ambulated with steady gait by herself back to bed after fresh linens.

## 2020-01-27 NOTE — PROGRESS NOTES
Have stopped in twice, but patient comfortably sleeping  VSS   Has received one unit PRBCs  Hgb pending   Will recheck later

## 2020-01-27 NOTE — PROGRESS NOTES
Pt was up to the bathroom, nurse stand by, pt became unresponsive while sitting on toilet. [facility] called, pt brought back to bed VS: /58, RR 22, HR 80, SPO2 97% on room air, Temp 99.0 temporal. Fundus is firm, bleeding is moderate. Bolus of NS started. Pt is now responsive. Labs pending. Will continue to monitor and provide interventions as needed. Dr. SOWMYA Shook present in dept and Dr. Ocampo responded to RR.

## 2020-01-27 NOTE — PROGRESS NOTES
Bebe Carl is a 19 year old female, requiring blood transfusion of 1 units today for a hemoglobin of 6.8 per MD order.  Patient identified with two identifiers, order verified, and verbal consent for today's infusion obtained from patient. Written consent for treatment is on file and valid.  Pt denies having any further questions and discussed the risks and benefits with ordering physician for blood transfusion prior to consent signature.     Blood product verified by lab of crossmatch compatibility.   IV pump verified with dose, drug, and rate of administration by second RNJanine  Infusion began  per protocol. No transfusion reaction noted in the first 15 minutes.  Will continue to monitor and provide interventions as needed as well as report to oncoming shift.      Farzana Banks RN on 1/27/2020 at 6:58 AM        Recent lab values:    Recent Labs   Lab 01/27/20  0457 01/26/20  1951 01/26/20  0524   WBC  --  25.4* 17.0*   HGB 6.8* 9.2* 11.9   MCV  --  92 90   PLT  --  309 275

## 2020-01-27 NOTE — PLAN OF CARE
Fundus is firm and below the U.  VSS.  febrile. Pain well controlled. Bleeding small. Perineum swollen. Using ice packs and tucks to perineum. Attentive to baby.  Needs coaching with breastfeeding but improvement seen. Using nipple shield.  Meléndez catheter in place due to syncopal episode earlier this night.  Patient is alert and oriented now.

## 2020-01-28 VITALS
OXYGEN SATURATION: 97 % | RESPIRATION RATE: 18 BRPM | DIASTOLIC BLOOD PRESSURE: 64 MMHG | TEMPERATURE: 98.3 F | HEART RATE: 79 BPM | SYSTOLIC BLOOD PRESSURE: 114 MMHG

## 2020-01-28 LAB — T PALLIDUM AB SER QL: NONREACTIVE

## 2020-01-28 PROCEDURE — 99207 ZZC NO CHARGE LOS: CPT | Performed by: OBSTETRICS & GYNECOLOGY

## 2020-01-28 PROCEDURE — 25000132 ZZH RX MED GY IP 250 OP 250 PS 637: Performed by: OBSTETRICS & GYNECOLOGY

## 2020-01-28 RX ORDER — IBUPROFEN 800 MG/1
800 TABLET, FILM COATED ORAL EVERY 8 HOURS PRN
Qty: 60 TABLET | Refills: 1 | Status: SHIPPED | OUTPATIENT
Start: 2020-01-28 | End: 2021-05-27

## 2020-01-28 RX ORDER — MULTIVIT WITH MINERALS/LUTEIN
1000 TABLET ORAL DAILY
Qty: 100 TABLET | Refills: 0 | Status: SHIPPED | OUTPATIENT
Start: 2020-01-28 | End: 2021-05-27

## 2020-01-28 RX ADMIN — ACETAMINOPHEN 650 MG: 325 TABLET, FILM COATED ORAL at 01:21

## 2020-01-28 RX ADMIN — DOCUSATE SODIUM 100 MG: 100 CAPSULE, LIQUID FILLED ORAL at 11:48

## 2020-01-28 RX ADMIN — IBUPROFEN 800 MG: 400 TABLET, FILM COATED ORAL at 05:17

## 2020-01-28 NOTE — PROGRESS NOTES
Doing well PPD#2  Ambulating well  Lochia normal  Voiding without difficulty  AVSS  Uterus firm  Hgb note repeated    A: Stable PPD#2  P: Continue postpartum care  Routine instructions  Continue PNV, add extra iron and Vit C  Home later today

## 2020-01-28 NOTE — DISCHARGE SUMMARY
Admitting Diagnosis: 39 weeks gestation, active labor with SROM  Discharge Diagnosis: viable infant female, anemia  Delivering Physician: GENET  Primary Physician: LACEY    Clinical and Delivery History: 19 year old primigravida admitted with SROM and early labor.  Epidural utilized.  Steady progress during 1st stage.  2nd stage was 2.5 hours in duration and was significant for moderately heavy bleeding starting about one hour in.  Had a  of a vigorous infant female weight 9#.  She had a large spontaneous tear, 2nd degree, extending 2/3rds the way back to the cervix posteriorly in the midline.  Rectal sphingter was intact.  Repaired in 3 layers with vicryl.  EBL for delivery was 800+ ml.  Post partum pt became lightheaded with ambulation.  Fluid bolus given.  Hgb 9.2 but dropped to 6.8 the following morning.  Therefore was transfused with one unit PRBCs.    Post Partum Course: Post transfusion pt did well.  Breast feeding.  Good ambulation    Hgb: 8.3    Rhogam: O pos  MMR: immune    Meds; PNV, FE, Vit C    Follow up: 2 and 6 weeks

## 2020-01-28 NOTE — PLAN OF CARE
VSS. Fundus firm, U/1. Light lochia, no clots. Abdominal cramping and back pain are adequately managed with rotating tylenol and ibuprofen and warm packs, see MAR. Patient bonding well with baby. Breastfeeding is going well upon assessment and per patient report. Refer to flowsheets for further vital signs and assessments.

## 2020-01-28 NOTE — PROGRESS NOTES
Patient discharged to home. iv site discontinued.  Ambulating around the room. Pain 2/10. Reviewed discharge instructions and follow up appointments and prescriptions with patient. Breast feeding.

## 2020-01-29 ENCOUNTER — HOSPITAL ENCOUNTER (OUTPATIENT)
Dept: OBGYN | Facility: OTHER | Age: 20
End: 2020-01-29
Attending: OBSTETRICS & GYNECOLOGY
Payer: COMMERCIAL

## 2020-01-29 ENCOUNTER — LACTATION ENCOUNTER (OUTPATIENT)
Age: 20
End: 2020-01-29

## 2020-01-29 PROCEDURE — S9443 LACTATION CLASS: HCPCS | Performed by: REGISTERED NURSE

## 2020-01-29 NOTE — LACTATION NOTE
This note was copied from a baby's chart.  Outpatient Lactation Visit    Ana Rosa Carl  [id number]    Consultation Date: 2020     Reason for Lactation Referral: Initial Lactation Consult    Baby's : 2020    Baby's Current Age: 3 day old  Baby's Gestational Age: Gestational Age: 39w6d    Primary Care Provider: Corinna Valdovinos    Presenting Problem (concerns as stated by parent): No concerns    MATERNAL HISTORY   History of Breast Surgery: no  Breast Changes During Pregnancy: no  Breast Feeding History: primigravida  Maternal Meds: daily prenatal vitamin  Pregnancy Complications: none  Anesthesia during labor: epidural    MATERNAL ASSESSMENT    Breast Size: average, symmetrical, soft after feeding and filling prior to feeding  Nipple Appearance - Left: slightly cracked, with signs of healing, education on further healing techniques provided  Nipple Appearance - Right: slightly cracked, with signs of healing, education on further healing techniques provided  Nipple Erectility - Left: erect with stimulation  Nipple Erectility - Right: erect with stimulation  Areolas Compressibility: soft  Nipple Size: average  Special Equipment Used: 24 mm nipple shield  Day mother reports milk came in:  Day 3    INFANT ASSESSMENT    Oral Anatomy  Mouth: normal  Palate: normal  Jaw: normal  Tongue: normal  Frenulum: normal   Digital Suck Exam: root    FEEDING   Feeding Time: aggressively for 20 minutes  Position:  cradle  Effort to Latch: awake and alert, latched easily  Duration of Breast Feeding: Right Breast: 0; Left Breast: 20 minutes  Results: excellent breast feed    Volume of Intake:    Birth Weight: 8 lb 15.4 oz    Hospital discharge weight: 8 lb 11.9 oz    Today's Weight 8 lb 8.5 oz    Total Intake: 1.2 oz  Output: 5-6 soil diapers in last 24 hours, 4-5 wet diapers in last 24 hours    LATCH Score:   Latch: 2 - Good Latch  Audible Swallowin - Spontaneous & frequent  Type of Nipple: (Breast/Nipple) 2 -  Everted  Comfort: 2 - Soft, Nontender  Hold: 2 - No Assist   Total LATCH Score:  10    FEEDING PLAN    Home Feeding Plan: Continue to feed on demand when  elicits feeding cues with deep latch.  Babe should be eating 8-12 times in a 24 hour period.  Exclusivity explained and encouraged in the early weeks to establish breastfeeding and order in milk supply.  Rooming-in encouraged with explanation of the benefits.  Continue to apply expressed breast milk and Lanolin cream to nipples after feedings for healing and comfort.  Postpartum breastfeeding assessment completed and education provided.  Items included in the education are:     proper positioning and latch    effectiveness of feeding    manual expression    handling and storing breastmilk    maintenance of breastfeeding for the first 6 months    sign/symptoms of infant feeding issues requiring referral to qualified health care provider    LACTATION COMMENTS   Deep latch explained for proper positioning of breast in infant's mouth, maximizing milk transfer and comfort.  Reassurance and encouragement provided in regard to mom's concerns about milk supply.  Follow-up support information provided.  Parents plan to keep  Well-Child Check with Dr. Valdovinos as scheduled for 2 week well child check.      Face-to-face Time: 60 minutes with assessment and education.    Tiffanie Del Real, RN  2020  2:50 PM

## 2020-02-26 ENCOUNTER — HOSPITAL ENCOUNTER (EMERGENCY)
Facility: OTHER | Age: 20
Discharge: HOME | End: 2020-02-26
Attending: FAMILY MEDICINE | Admitting: FAMILY MEDICINE
Payer: COMMERCIAL

## 2020-02-26 VITALS
OXYGEN SATURATION: 98 % | HEIGHT: 61 IN | DIASTOLIC BLOOD PRESSURE: 57 MMHG | BODY MASS INDEX: 27.75 KG/M2 | TEMPERATURE: 99.5 F | WEIGHT: 147 LBS | HEART RATE: 59 BPM | RESPIRATION RATE: 14 BRPM | SYSTOLIC BLOOD PRESSURE: 104 MMHG

## 2020-02-26 DIAGNOSIS — J11.1 INFLUENZA-LIKE ILLNESS: ICD-10-CM

## 2020-02-26 LAB — LACTATE BLD-SCNC: 0.8 MMOL/L (ref 0.7–2)

## 2020-02-26 PROCEDURE — 99283 EMERGENCY DEPT VISIT LOW MDM: CPT | Performed by: FAMILY MEDICINE

## 2020-02-26 PROCEDURE — 99282 EMERGENCY DEPT VISIT SF MDM: CPT | Mod: Z6 | Performed by: FAMILY MEDICINE

## 2020-02-26 PROCEDURE — 36415 COLL VENOUS BLD VENIPUNCTURE: CPT | Performed by: FAMILY MEDICINE

## 2020-02-26 PROCEDURE — 83605 ASSAY OF LACTIC ACID: CPT | Performed by: FAMILY MEDICINE

## 2020-02-26 PROCEDURE — 25000132 ZZH RX MED GY IP 250 OP 250 PS 637: Performed by: FAMILY MEDICINE

## 2020-02-26 RX ORDER — OSELTAMIVIR PHOSPHATE 75 MG/1
75 CAPSULE ORAL 2 TIMES DAILY
Qty: 10 CAPSULE | Refills: 0 | Status: SHIPPED | OUTPATIENT
Start: 2020-02-26 | End: 2020-02-26

## 2020-02-26 RX ORDER — OSELTAMIVIR PHOSPHATE 75 MG/1
75 CAPSULE ORAL 2 TIMES DAILY
Qty: 10 CAPSULE | Refills: 0 | Status: SHIPPED | OUTPATIENT
Start: 2020-02-26 | End: 2020-03-06

## 2020-02-26 RX ORDER — ACETAMINOPHEN 500 MG
1000 TABLET ORAL ONCE
Status: COMPLETED | OUTPATIENT
Start: 2020-02-26 | End: 2020-02-26

## 2020-02-26 RX ADMIN — ACETAMINOPHEN 1000 MG: 500 TABLET, FILM COATED ORAL at 22:02

## 2020-02-26 ASSESSMENT — ENCOUNTER SYMPTOMS
DIARRHEA: 0
HEADACHES: 1
WHEEZING: 0
CHILLS: 1
SHORTNESS OF BREATH: 0
COUGH: 1
ABDOMINAL PAIN: 0
MUSCULOSKELETAL NEGATIVE: 1
FEVER: 1

## 2020-02-26 ASSESSMENT — MIFFLIN-ST. JEOR: SCORE: 1379.17

## 2020-02-26 NOTE — ED AVS SNAPSHOT
[facility]  [address]  [place] [address]  Phone:  [phone]  Fax:  [phone]                                    Bebe Carl   MRN: [id number]    Department:  [facility]   Date of Visit:  2/26/2020           After Visit Summary Signature Page    I have received my discharge instructions, and my questions have been answered. I have discussed any challenges I see with this plan with the nurse or doctor.    ..........................................................................................................................................  Patient/Patient Representative Signature      ..........................................................................................................................................  Patient Representative Print Name and Relationship to Patient    ..................................................               ................................................  Date                                   Time    ..........................................................................................................................................  Reviewed by Signature/Title    ...................................................              ..............................................  Date                                               Time          22EPIC Rev 08/18

## 2020-02-27 NOTE — ED TRIAGE NOTES
"Pt comes into the ER today reporting hot an cold flashes. Temp of 100.5 today. Just noticed it this morning. \"I feel like I am going to pass out when I stand up.\" Pt is alert and orientated in triage, no distress, steady on feet. Pt is with brother, who she lives with, that has similar concerns. Recent exposure to influenza  "

## 2020-02-27 NOTE — ED PROVIDER NOTES
History     Chief Complaint   Patient presents with     Dizziness     HPI  Bebe Carl is a 19 year old female who presents with fever, cough and chills. Started this morning. Of note her brother is also sick and with her in the ED. Her father was recently diagnosed with pneumonia and influenza so she is worried that she might have that. She currently is breastfeeding her 1 month old baby. No SOB, vomiting or diarrhea. No abdominal pain.     Allergies:  Allergies   Allergen Reactions     Sulfa Drugs Hives and Anaphylaxis     Atomoxetine GI Disturbance     Weight loss     Latex Rash     bandaides only       Problem List:    Patient Active Problem List    Diagnosis Date Noted     Normal labor and delivery 2020     Priority: Medium      (normal spontaneous vaginal delivery) 2020     Priority: Medium     Acute gastroenteritis 2019     Priority: Medium     Dehydration 2019     Priority: Medium      contractions 2019     Priority: Medium     Encounter for triage in pregnant patient 10/01/2019     Priority: Medium     ADHD 2018     Priority: Medium     Appendicitis with abscess 2017     Priority: Medium     Mononucleosis 2017     Priority: Medium     Ruptured suppurative appendicitis 2017     Priority: Medium     Controlled substance agreement signed 2016     Priority: Medium        Past Medical History:    Past Medical History:   Diagnosis Date     Acute gastroenteritis 2019     Bipolar 1 disorder (H)      Viral gastroenteritis 2019       Past Surgical History:    Past Surgical History:   Procedure Laterality Date     APPENDECTOMY         Family History:    No family history on file.    Social History:  Marital Status:  Single [1]  Social History     Tobacco Use     Smoking status: Former Smoker     Packs/day: 1.00     Smokeless tobacco: Never Used   Substance Use Topics     Alcohol use: No     Drug use: No        Medications:   "  ferrous sulfate (FEROSUL) 325 (65 Fe) MG tablet  ibuprofen (ADVIL/MOTRIN) 800 MG tablet  oseltamivir (TAMIFLU) 75 MG capsule  vitamin C (ASCORBIC ACID) 1000 MG TABS          Review of Systems   Constitutional: Positive for chills and fever.   Respiratory: Positive for cough. Negative for shortness of breath and wheezing.    Cardiovascular: Negative for chest pain.   Gastrointestinal: Negative for abdominal pain and diarrhea.   Genitourinary: Negative.    Musculoskeletal: Negative.    Neurological: Positive for headaches.       Physical Exam   BP: 103/63  Pulse: 59  Heart Rate: 135  Temp: 101.9  F (38.8  C)  Resp: 14  Height: 154.9 cm (5' 1\")  Weight: 66.7 kg (147 lb)  SpO2: 98 %      Physical Exam  Constitutional:       General: She is not in acute distress.  HENT:      Mouth/Throat:      Mouth: Mucous membranes are moist.      Pharynx: Oropharynx is clear.   Cardiovascular:      Rate and Rhythm: Normal rate and regular rhythm.   Pulmonary:      Effort: Pulmonary effort is normal.      Breath sounds: Normal breath sounds.   Abdominal:      General: Abdomen is flat. Bowel sounds are normal.      Tenderness: There is no abdominal tenderness.   Skin:     General: Skin is warm and dry.   Neurological:      General: No focal deficit present.      Mental Status: She is alert.         ED Course        Procedures    Results for orders placed or performed during the hospital encounter of 02/26/20 (from the past 24 hour(s))   Lactic acid   Result Value Ref Range    Lactic Acid 0.8 0.7 - 2.0 mmol/L       Medications   acetaminophen (TYLENOL) tablet 1,000 mg (1,000 mg Oral Given 2/26/20 2202)       Assessments & Plan (with Medical Decision Making)     I have reviewed the nursing notes.    I have reviewed the findings, diagnosis, plan and need for follow up with the patient.      New Prescriptions    OSELTAMIVIR (TAMIFLU) 75 MG CAPSULE    Take 1 capsule (75 mg) by mouth 2 times daily       Final diagnoses:   Influenza-like " illness   Patient started having chills this morning. Also notes a headache and a cough. Her father was recently diagnosed with pneumonia and influenza. She declined getting swabbed for influenza but with her fever, cough, close contact with influenza, and she is breastfeeding we decided to give her Tamiflu.     Silvina Resendiz, MS3, [facility] student   Working under the supervision of Dr. Nakul MD    Note started by MS3 AMALIA,  I revised, edited and addended note as needed.  In addition patient was seen and examined by myself including both history and physical examination. My findings are reflected in the note above.          2/26/2020   [facility]     Daniel Mota MD  02/27/20 0696

## 2020-03-03 ENCOUNTER — NURSE TRIAGE (OUTPATIENT)
Dept: FAMILY MEDICINE | Facility: OTHER | Age: 20
End: 2020-03-03

## 2020-03-03 NOTE — TELEPHONE ENCOUNTER
Per [facility] triage department policy, writer unable to triage Pt, as she has not seen a primary care provider in clinic in the past 3 years. Noted she previously saw Dr. Guillermo, who is no longer providing care at [facility]. LOV: 9/28/2017. She also saw Pia Irwin on 10/30/18, however it was in the [facility]. Noted Pt has been seeing Dr. Mandie Shook in OBGYN department.     Will route to OBGYN RN for further assessment as appropriate per recommendation of OBGYN LIZ Box. She states OBGYN RNs who are currently with patients.    Shanti Knight RN .............. 3/3/2020  3:47 PM

## 2020-03-03 NOTE — TELEPHONE ENCOUNTER
Per AMERICA Crocker RN, Pt no longer pregnant.     Writer attempted reaching Pt, to suggest she either schedule appointment with available provider, present to [facility] or ED. Left message on machine to call back. Shanti Knight RN .............. 3/3/2020  3:58 PM

## 2020-03-03 NOTE — TELEPHONE ENCOUNTER
Patient returned call and her last name and  were verified. She was notified of the information below and verbalized plan to go to [facility] at this time. Shanti Knight RN .............. 3/3/2020  4:11 PM

## 2020-03-06 ENCOUNTER — OFFICE VISIT (OUTPATIENT)
Dept: FAMILY MEDICINE | Facility: OTHER | Age: 20
End: 2020-03-06
Attending: NURSE PRACTITIONER
Payer: MEDICAID

## 2020-03-06 ENCOUNTER — HOSPITAL ENCOUNTER (OUTPATIENT)
Dept: GENERAL RADIOLOGY | Facility: OTHER | Age: 20
End: 2020-03-06
Attending: NURSE PRACTITIONER
Payer: MEDICAID

## 2020-03-06 VITALS
DIASTOLIC BLOOD PRESSURE: 62 MMHG | TEMPERATURE: 97.6 F | RESPIRATION RATE: 16 BRPM | HEIGHT: 62 IN | BODY MASS INDEX: 26.41 KG/M2 | SYSTOLIC BLOOD PRESSURE: 104 MMHG | HEART RATE: 68 BPM | WEIGHT: 143.5 LBS | OXYGEN SATURATION: 98 %

## 2020-03-06 DIAGNOSIS — R06.02 SHORTNESS OF BREATH: ICD-10-CM

## 2020-03-06 DIAGNOSIS — R07.89 CHEST HEAVINESS: ICD-10-CM

## 2020-03-06 DIAGNOSIS — G93.31 POST-INFLUENZA SYNDROME: Primary | ICD-10-CM

## 2020-03-06 LAB
BASOPHILS # BLD AUTO: 0.1 10E9/L (ref 0–0.2)
BASOPHILS NFR BLD AUTO: 0.6 %
D DIMER PPP DDU-MCNC: <200 NG/ML D-DU (ref 0–230)
DIFFERENTIAL METHOD BLD: ABNORMAL
EOSINOPHIL # BLD AUTO: 0.7 10E9/L (ref 0–0.7)
EOSINOPHIL NFR BLD AUTO: 9.2 %
ERYTHROCYTE [DISTWIDTH] IN BLOOD BY AUTOMATED COUNT: 12.1 % (ref 10–15)
HCT VFR BLD AUTO: 38 % (ref 35–47)
HGB BLD-MCNC: 12.2 G/DL (ref 11.7–15.7)
IMM GRANULOCYTES # BLD: 0 10E9/L (ref 0–0.4)
IMM GRANULOCYTES NFR BLD: 0.3 %
LYMPHOCYTES # BLD AUTO: 2.1 10E9/L (ref 0.8–5.3)
LYMPHOCYTES NFR BLD AUTO: 26.6 %
MCH RBC QN AUTO: 28 PG (ref 26.5–33)
MCHC RBC AUTO-ENTMCNC: 32.1 G/DL (ref 31.5–36.5)
MCV RBC AUTO: 87 FL (ref 78–100)
MONOCYTES # BLD AUTO: 0.4 10E9/L (ref 0–1.3)
MONOCYTES NFR BLD AUTO: 4.7 %
NEUTROPHILS # BLD AUTO: 4.6 10E9/L (ref 1.6–8.3)
NEUTROPHILS NFR BLD AUTO: 58.6 %
PLATELET # BLD AUTO: 477 10E9/L (ref 150–450)
RBC # BLD AUTO: 4.36 10E12/L (ref 3.8–5.2)
WBC # BLD AUTO: 7.9 10E9/L (ref 4–11)

## 2020-03-06 PROCEDURE — 85025 COMPLETE CBC W/AUTO DIFF WBC: CPT | Mod: ZL | Performed by: NURSE PRACTITIONER

## 2020-03-06 PROCEDURE — 85379 FIBRIN DEGRADATION QUANT: CPT | Mod: ZL | Performed by: NURSE PRACTITIONER

## 2020-03-06 PROCEDURE — 36415 COLL VENOUS BLD VENIPUNCTURE: CPT | Mod: ZL | Performed by: NURSE PRACTITIONER

## 2020-03-06 PROCEDURE — 71046 X-RAY EXAM CHEST 2 VIEWS: CPT

## 2020-03-06 PROCEDURE — G0463 HOSPITAL OUTPT CLINIC VISIT: HCPCS | Performed by: NURSE PRACTITIONER

## 2020-03-06 PROCEDURE — 99214 OFFICE O/P EST MOD 30 MIN: CPT | Performed by: NURSE PRACTITIONER

## 2020-03-06 PROCEDURE — G0463 HOSPITAL OUTPT CLINIC VISIT: HCPCS | Mod: 25 | Performed by: NURSE PRACTITIONER

## 2020-03-06 ASSESSMENT — PAIN SCALES - GENERAL: PAINLEVEL: MILD PAIN (2)

## 2020-03-06 ASSESSMENT — MIFFLIN-ST. JEOR: SCORE: 1371.22

## 2020-03-06 NOTE — PROGRESS NOTES
"HPI:    Bebe Carl is a 19 year old female  who presents to clinic today for chest pain and shortness of breath.     Initial symptoms of chills, fever, headache, and cough started 9 days ago.  Patient was seen initially for influenza on 2/26/2020, and treated with Tamiflu.   Patient returns to clinic today with concerns of \"pain in her lungs and throat when breathing\" that has been ongoing since her symptoms started.  States pain is only with breathing.  States she feels like she has an elevated sitting on her chest.  States she had a really hard time catching her breath last night. Feeling winded/short of breath at rest and with activity.  States she hasn't had a cough.  Denies any recent fevers or chills.  Nausea the past 2 to 3 days, no vomiting.  Energy decreased, low the past 9 days.  Denies any pain or swelling in feet or calves.      Postpartum, vaginal delivery, date of delivery 1/26/2020, states she had post delivery hemorrhaging.  Patient is breast feeding.          Past Medical History:   Diagnosis Date     Acute gastroenteritis 12/24/2019     Bipolar 1 disorder (H)      Viral gastroenteritis 12/24/2019     Past Surgical History:   Procedure Laterality Date     APPENDECTOMY       Social History     Tobacco Use     Smoking status: Former Smoker     Packs/day: 1.00     Smokeless tobacco: Never Used   Substance Use Topics     Alcohol use: No     Current Outpatient Medications   Medication Sig Dispense Refill     vitamin C (ASCORBIC ACID) 1000 MG TABS Take 1 tablet (1,000 mg) by mouth daily 100 tablet 0     ferrous sulfate (FEROSUL) 325 (65 Fe) MG tablet Take 1 tablet (325 mg) by mouth daily (with breakfast) (Patient not taking: Reported on 3/6/2020) 90 tablet 1     ibuprofen (ADVIL/MOTRIN) 800 MG tablet Take 1 tablet (800 mg) by mouth every 8 hours as needed for moderate pain (mild-moderate pain) (Patient not taking: Reported on 3/6/2020) 60 tablet 1     Allergies   Allergen Reactions     Sulfa Drugs " "Hives and Anaphylaxis     Atomoxetine GI Disturbance     Weight loss     Latex Rash     bandaides only         Past medical history, past surgical history, current medications and allergies reviewed and accurate to the best of my knowledge.        ROS:  Refer to HPI    /62   Pulse 68   Temp 97.6  F (36.4  C) (Tympanic)   Resp 16   Ht 1.562 m (5' 1.5\")   Wt 65.1 kg (143 lb 8 oz)   LMP 04/22/2019 (Exact Date)   SpO2 98%   BMI 26.68 kg/m      EXAM:  General Appearance: Well appearing female adolescent, non-ill-appearing, appropriate appearance for age. No acute distress  Ears: Left TM intact, translucent with bony landmarks appreciated, no erythema, no effusion, no bulging, no purulence.  Right TM intact, translucent with bony landmarks appreciated, no erythema, no effusion, no bulging, no purulence.  Left auditory canal clear.  Right auditory canal clear.  Normal external ears, non tender.  Eyes: conjunctivae normal without erythema or irritation, corneas clear, no drainage or crusting, no eyelid swelling, pupils equal   Orophayrnx: moist mucous membranes, posterior pharynx without erythema, tonsils without hypertrophy, no erythema, no exudates or petechiae, no post nasal drip seen, no trismus, voice clear.    Nose: no drainage or congestion   Neck: supple without adenopathy  Respiratory: normal chest wall and respirations.  Normal effort.  Clear to auscultation bilaterally, no wheezing, crackles or rhonchi.  No increased work of breathing.  No cough appreciated, oxygen saturation 98%  Cardiac: RRR with no murmurs  Musculoskeletal:  Equal movement of bilateral upper extremities.  Equal movement of bilateral lower extremities.  Normal gait.    Psychological: normal affect, alert, oriented, and pleasant.       Xray and labs:  Results for orders placed or performed during the hospital encounter of 03/06/20   XR Chest 2 Views     Status: None    Narrative    PROCEDURE:  XR CHEST 2 VW    HISTORY:  Chest " heaviness; Shortness of breath.     COMPARISON:  2017    FINDINGS:   The cardiac silhouette is normal in size. The pulmonary vasculature is  normal.  The lungs are clear. No pleural effusion or pneumothorax.      Impression    IMPRESSION:  No acute cardiopulmonary disease.      XAVIER RICE MD   Results for orders placed or performed in visit on 03/06/20   CBC and Differential     Status: Abnormal   Result Value Ref Range    WBC 7.9 4.0 - 11.0 10e9/L    RBC Count 4.36 3.8 - 5.2 10e12/L    Hemoglobin 12.2 11.7 - 15.7 g/dL    Hematocrit 38.0 35.0 - 47.0 %    MCV 87 78 - 100 fl    MCH 28.0 26.5 - 33.0 pg    MCHC 32.1 31.5 - 36.5 g/dL    RDW 12.1 10.0 - 15.0 %    Platelet Count 477 (H) 150 - 450 10e9/L    Diff Method Automated Method     % Neutrophils 58.6 %    % Lymphocytes 26.6 %    % Monocytes 4.7 %    % Eosinophils 9.2 %    % Basophils 0.6 %    % Immature Granulocytes 0.3 %    Absolute Neutrophil 4.6 1.6 - 8.3 10e9/L    Absolute Lymphocytes 2.1 0.8 - 5.3 10e9/L    Absolute Monocytes 0.4 0.0 - 1.3 10e9/L    Absolute Eosinophils 0.7 0.0 - 0.7 10e9/L    Absolute Basophils 0.1 0.0 - 0.2 10e9/L    Abs Immature Granulocytes 0.0 0 - 0.4 10e9/L   D-Dimer GH     Status: None   Result Value Ref Range    D-Dimer ng/mL <200 0 - 230 ng/ml D-DU               ASSESSMENT/PLAN:  1. Chest heaviness    - XR Chest 2 Views; Future    - CBC and Differential; Future  - CBC and Differential    - D-Dimer GH; Future  - D-Dimer GH    2. Shortness of breath    - XR Chest 2 Views; Future    - CBC and Differential; Future  - CBC and Differential    - D-Dimer GH; Future  - D-Dimer GH      3. Post-influenza syndrome    Patient signed out AMA at approximately 11:29 am.  Patient returned to clinic approximately 1240 to complete visit and have chest x-ray and labs completed at that time so remainder of visit was completed at that time.    Differential diagnoses of importance included anemia, PE, pneumonia, etc.  CBC normal including WBC of 7.9  and hemoglobin of 12.2  D-dimer negative with value of < 200  Chest x-ray completed and personally reviewed, no obvious infiltrate appreciated, radiologist over read: No acute cardiopulmonary disease.    Discussed with patient her symptoms are most consistent with post influenza inflammation and irritation.  Discussed with patient expectation that symptoms should improve and lessen over the course of the next week.  Recommend symptomatic treatment including over-the-counter ibuprofen or naproxen, humidifier use, tea with honey, sleeping elevated, etc.    Follow up with PCP if symptoms persist or worsen or concerns      I explained my diagnostic considerations and recommendations to the patient, who voiced understanding and agreement with the treatment plan. All questions were answered. We discussed potential side effects of any prescribed or recommended therapies, as well as expectations for response to treatments.    Disclaimer:  This note consists of words and symbols derived from keyboarding, dictation, or using voice recognition software. As a result, there may be errors in the script that have gone undetected. Please consider this when interpreting information found in this note.

## 2020-03-06 NOTE — NURSING NOTE
"Chief Complaint   Patient presents with     Shortness of Breath     Patient is here for pain in her \"lungs and throat\" when breathing in that started 2 weeks ago. Patient was treated for influenza like symptoms on 2/26/20.     Initial /62   Pulse 68   Temp 97.6  F (36.4  C) (Tympanic)   Resp 16   Ht 1.562 m (5' 1.5\")   Wt 65.1 kg (143 lb 8 oz)   LMP 04/22/2019 (Exact Date)   SpO2 98%   BMI 26.68 kg/m   Estimated body mass index is 26.68 kg/m  as calculated from the following:    Height as of this encounter: 1.562 m (5' 1.5\").    Weight as of this encounter: 65.1 kg (143 lb 8 oz).  Medication Reconciliation: complete    Tiffanie Christopher, LIZ  "

## 2020-03-06 NOTE — PATIENT INSTRUCTIONS
Labs - no indications of bacterial infection, blood clot or anemia as cause of your symptoms    Chest xray - no pneumonia    Symptoms are consistent with post inflammation and irritation lingering from influenza and should continue to improve/lessen and resolve over the next week    Try treatment with: Ibuprofen or naproxen, humidifier during times of sleep, tea with honey, extra fluids, etc    Follow up with your primary provider if no improvement or worsening

## 2020-03-10 ENCOUNTER — PRENATAL OFFICE VISIT (OUTPATIENT)
Dept: OBGYN | Facility: OTHER | Age: 20
End: 2020-03-10
Attending: OBSTETRICS & GYNECOLOGY
Payer: MEDICAID

## 2020-03-10 VITALS
RESPIRATION RATE: 12 BRPM | HEART RATE: 97 BPM | BODY MASS INDEX: 26.51 KG/M2 | SYSTOLIC BLOOD PRESSURE: 110 MMHG | WEIGHT: 142.6 LBS | DIASTOLIC BLOOD PRESSURE: 70 MMHG

## 2020-03-10 DIAGNOSIS — Z01.812 PRE-PROCEDURE LAB EXAM: ICD-10-CM

## 2020-03-10 LAB — HCG UR QL: NEGATIVE

## 2020-03-10 PROCEDURE — 25000128 H RX IP 250 OP 636: Performed by: OBSTETRICS & GYNECOLOGY

## 2020-03-10 PROCEDURE — 99207 ZZC POST-PARTUM 6 WK VISIT - GICH ONLY: CPT | Performed by: OBSTETRICS & GYNECOLOGY

## 2020-03-10 PROCEDURE — 81025 URINE PREGNANCY TEST: CPT | Mod: ZL | Performed by: OBSTETRICS & GYNECOLOGY

## 2020-03-10 PROCEDURE — 96372 THER/PROPH/DIAG INJ SC/IM: CPT

## 2020-03-10 RX ORDER — MEDROXYPROGESTERONE ACETATE 150 MG/ML
150 INJECTION, SUSPENSION INTRAMUSCULAR
Status: ACTIVE | OUTPATIENT
Start: 2020-03-10 | End: 2021-03-05

## 2020-03-10 RX ADMIN — MEDROXYPROGESTERONE ACETATE 150 MG: 150 INJECTION, SUSPENSION INTRAMUSCULAR at 11:39

## 2020-03-10 ASSESSMENT — PAIN SCALES - GENERAL: PAINLEVEL: NO PAIN (0)

## 2020-03-10 NOTE — NURSING NOTE
Patient here for 6 week post partum. No concerns.   Medication Reconciliation: complete    Leopoldo Cheatham LPN  3/10/2020 10:53 AM

## 2020-03-10 NOTE — PROGRESS NOTES
6 week Postpartum Visit Note    S:  Ms. Bebe Carl is a 19 year old  here for her 6-week postpartum checkup.   - Had a  on 20. C/b postpartum hemorrhage secondary to large laceration  - Infant gender:  girl, weight 8 pounds 15.4 oz.  - Feeding Method:  .  Complications reported with feeding:  none, infant thriving .    - Bleeding:  None.  Duration:  Stopped 2-3 weeks postpartum.  Menses resumed:  No  - Bowel/Urinary problems:  No  - Mood: good  - Sleep: good    - Contraception Planned:  Depo-Provera  - She  has not had intercourse since delivery..    - Current tobacco use:  No  - Hx of Abuse:  No  ================================================================  ROS: 10 point ROS neg other than the symptoms noted above in the HPI.     O:  /70 (BP Location: Right arm, Patient Position: Sitting, Cuff Size: Adult Regular)   Pulse 97   Resp 12   Wt 64.7 kg (142 lb 9.6 oz)   LMP 2019 (Exact Date)   BMI 26.51 kg/m    Gen: Well-appearing, NAD  Psych:  Appropriate mood and affect  Abd:  Benign, Soft, flat, non-tender and No masses, organomegaly  Pelvic: Tight pelvic floor muscles but well healed perineum and vagina. Normal cervix. Normal sized uterus, no adnexal masses    A/P:  Ms. Bebe Carl is a 19 year old  here for 6 week postpartum visit after . Doing well.  - Discussed pelvic floor relaxation techniques, lubricants with intercourse  - Contraception: Depo  - Feeding: breast  - Follow-up: annually or sooner boogie Shook MD  OB/GYN  3/10/2020 11:03 AM

## 2020-05-26 ENCOUNTER — MEDICAL CORRESPONDENCE (OUTPATIENT)
Dept: HEALTH INFORMATION MANAGEMENT | Facility: OTHER | Age: 20
End: 2020-05-26

## 2020-08-11 ENCOUNTER — MEDICAL CORRESPONDENCE (OUTPATIENT)
Dept: HEALTH INFORMATION MANAGEMENT | Facility: OTHER | Age: 20
End: 2020-08-11

## 2020-08-26 ENCOUNTER — PATIENT OUTREACH (OUTPATIENT)
Dept: CARE COORDINATION | Facility: CLINIC | Age: 20
End: 2020-08-26

## 2020-08-26 NOTE — PROGRESS NOTES
"Clinic Care Coordination Contact  [facility]     Clinical Data: Care Coordinator Outreach  Outreach attempted x 1.  patient's is currently shopping, says that we can call her back tomorrow. Plan: Care Coordinator will call tomorrow at patient's request. Shanti Smith RN on 8/26/2020 at 11:40 AM       Clinic Care Coordination Contact  Care Team Conversations    Patient was referred by [facility]. With chart review, utilization was found to be due to pregnancy with some infections over last year.      Reached out to patient to ensure she was managing well as a first time mother. She said everything has went \"perfect\". Baby doing well, no post partum depression. Discussed changes with Covid and she has adapted fine. No financial worries or other concerning SDOH.  Updated her address in chart as she has moved into a place of their own in [place]. Sounds upbeat. Denies concerns or resource needs right now. Let her know to call if she thinks of anything. Shanti Smith RN on 8/27/2020 at 4:24 PM    "

## 2021-05-26 NOTE — PROGRESS NOTES
OBGYN OFFICE VISIT    Chief Complaint: Contraception    HPI:  Ms. Carl is a 20year old  who presents to clinic today to discuss contraception options.    She had a  to an 8lb 15 oz baby girl in 2020. In the postpartum period she used DepoProvera for contraception.    We reviewed the [facility] contraception table with all appropriate options and efficacies.   In reviewing her medical history to ensure combined-hormonal contraceptive options are safe for her:  She does not have hypertension. BP today was 122/68 mmHg  She does not have a have history of migraine with aura  She has never had any VTE  She does not know of any familial thrombophilias  She has never had a personal history of breast or endometrial cancer  She is not currently breastfeeding    In discussing her reliability for consistent treatment she notes that she is good at remembering to take a medication each day.    After discussion of each option including OCPs, patch, vaginal ring, DepoProvera injection, Nexplanon implant or one of the IUDs she has decided she would like to use OCPs.    LMP: 2021, stopped bleeding last night  UPT collected today. If negative, she will start the OCPs. She understands not to start them until she hears back from me with her results.    Past Medical History:  Past Medical History:   Diagnosis Date     Acute gastroenteritis 2019     Bipolar 1 disorder (H)      Viral gastroenteritis 2019       Past Surgical History:  Past Surgical History:   Procedure Laterality Date     APPENDECTOMY         Medications:  Current Outpatient Medications   Medication     ibuprofen (ADVIL/MOTRIN) 800 MG tablet     vitamin C (ASCORBIC ACID) 1000 MG TABS     No current facility-administered medications for this visit.        Allergies:     Allergies   Allergen Reactions     Sulfa Drugs Hives and Anaphylaxis     Atomoxetine GI Disturbance     Weight loss     Latex Rash     bandaides only       OB history:  OB History     Para Term  AB Living   1 1 1 0 0 1   SAB TAB Ectopic Multiple Live Births   0 0 0 0 1      # Outcome Date GA Lbr Alistair/2nd Weight Sex Delivery Anes PTL Lv   1 Term 20 39w6d 11:38 / 03:00 4.065 kg (8 lb 15.4 oz) F Vag-Spont EPI, IV, Local N SARAH      Complications: Other Excessive Bleeding      Name: ANJALI,FEMALE-DEBBIE      Apgar1: 9  Apgar5: 9       Gyn history:  LMP 2021  Currently gets menses q 30 days, they last for 3 days  Just started about 4 months ago-- after delivery of daughter, Ana Rosa    ROS:   Skin: negative for rash, bruising  Eyes: negative for visual blurring, double vision  Ears/Nose/Throat: negative for nasal congestion, vertigo  Respiratory: No shortness of breath, dyspnea on exertion, cough, or hemoptysis  Cardiovascular: negative for palpitations, chest pain, lower extremity edema and syncope or near-syncope  Gastrointestinal: negative for, nausea, vomiting and hematemesis  Genitourinary: negative for, dysuria, frequency and urgency  Musculoskeletal: negative for, back pain and muscular weakness  Neurologic: negative for, headaches, syncope, seizures and local weakness  Psychiatric: negative for, anxiety, depression and hallucinations  Hematologic/Lymphatic/Immunologic: negative for, anemia, chills and fever      Exam  /68   Pulse 78   Wt 65.7 kg (144 lb 12.8 oz)   LMP 2021   Breastfeeding No   BMI 26.92 kg/m      Gen: Well-appearing, no acute distressed, well-groomed, alert  HEENT: Normocephalic, atraumatic  Cardiovascular: Regular rate, No peripheral edema, normal peripheral circulation  Pulm: Symmetric chest rise, non-labored respirations  Pelvic: Deferred    Assessment & Plan:  Ms. Carl is a 20 year old year old  here for Contraception counseling.    # OCP initiation  - Discussed appropriate use of OCPs and backup contraceptive use for the first month  - UPT today was negative  -Return to clinic as needed with concerns or for Well-woman  care.    Total amount of time spent during today's encounter with chart prep, face to face and documentation was 25 minutes  ANTOLIN GIVENS MD on 5/27/2021 at 5:15 PM

## 2021-05-27 ENCOUNTER — OFFICE VISIT (OUTPATIENT)
Dept: OBGYN | Facility: OTHER | Age: 21
End: 2021-05-27
Attending: STUDENT IN AN ORGANIZED HEALTH CARE EDUCATION/TRAINING PROGRAM
Payer: COMMERCIAL

## 2021-05-27 VITALS
HEART RATE: 78 BPM | DIASTOLIC BLOOD PRESSURE: 68 MMHG | WEIGHT: 144.8 LBS | SYSTOLIC BLOOD PRESSURE: 122 MMHG | BODY MASS INDEX: 26.92 KG/M2

## 2021-05-27 DIAGNOSIS — Z30.9 ENCOUNTER FOR CONTRACEPTIVE MANAGEMENT, UNSPECIFIED TYPE: Primary | ICD-10-CM

## 2021-05-27 LAB — HCG UR QL: NEGATIVE

## 2021-05-27 PROCEDURE — 81025 URINE PREGNANCY TEST: CPT | Mod: ZL | Performed by: STUDENT IN AN ORGANIZED HEALTH CARE EDUCATION/TRAINING PROGRAM

## 2021-05-27 PROCEDURE — G0463 HOSPITAL OUTPT CLINIC VISIT: HCPCS | Performed by: STUDENT IN AN ORGANIZED HEALTH CARE EDUCATION/TRAINING PROGRAM

## 2021-05-27 PROCEDURE — 99213 OFFICE O/P EST LOW 20 MIN: CPT | Performed by: STUDENT IN AN ORGANIZED HEALTH CARE EDUCATION/TRAINING PROGRAM

## 2021-05-27 RX ORDER — DEXTROAMPHETAMINE SACCHARATE, AMPHETAMINE ASPARTATE, DEXTROAMPHETAMINE SULFATE AND AMPHETAMINE SULFATE 3.75; 3.75; 3.75; 3.75 MG/1; MG/1; MG/1; MG/1
15 TABLET ORAL DAILY
Status: ON HOLD | COMMUNITY
Start: 2021-05-26 | End: 2022-03-21

## 2021-05-27 RX ORDER — NORGESTIMATE AND ETHINYL ESTRADIOL 7DAYSX3 LO
1 KIT ORAL DAILY
Qty: 84 TABLET | Refills: 4 | Status: SHIPPED | OUTPATIENT
Start: 2021-05-27 | End: 2021-10-01

## 2021-05-27 ASSESSMENT — PAIN SCALES - GENERAL: PAINLEVEL: NO PAIN (0)

## 2021-05-27 NOTE — NURSING NOTE
Pt presents to clinic today for consult on contraception. Would like to start on the pill.      Medication Reconciliation: complete  Jane Irwin LPN

## 2021-06-21 ENCOUNTER — OFFICE VISIT (OUTPATIENT)
Dept: FAMILY MEDICINE | Facility: OTHER | Age: 21
End: 2021-06-21
Attending: PHYSICIAN ASSISTANT
Payer: COMMERCIAL

## 2021-06-21 VITALS
WEIGHT: 147 LBS | HEART RATE: 90 BPM | OXYGEN SATURATION: 97 % | DIASTOLIC BLOOD PRESSURE: 76 MMHG | SYSTOLIC BLOOD PRESSURE: 122 MMHG | BODY MASS INDEX: 28.86 KG/M2 | RESPIRATION RATE: 18 BRPM | HEIGHT: 60 IN

## 2021-06-21 DIAGNOSIS — N92.6 MISSED PERIODS: Primary | ICD-10-CM

## 2021-06-21 LAB — HCG UR QL: NEGATIVE

## 2021-06-21 PROCEDURE — 81025 URINE PREGNANCY TEST: CPT | Mod: ZL | Performed by: PHYSICIAN ASSISTANT

## 2021-06-21 PROCEDURE — G0463 HOSPITAL OUTPT CLINIC VISIT: HCPCS

## 2021-06-21 PROCEDURE — 99213 OFFICE O/P EST LOW 20 MIN: CPT | Performed by: PHYSICIAN ASSISTANT

## 2021-06-21 ASSESSMENT — PAIN SCALES - GENERAL: PAINLEVEL: NO PAIN (0)

## 2021-06-21 ASSESSMENT — MIFFLIN-ST. JEOR: SCORE: 1358.29

## 2021-06-21 NOTE — NURSING NOTE
Patient presents to clinic after missing menstrual cycle and positive home pregnancy test.  Last cycle start date was 05/23/21.  Medication Reconciliation: complete    Shanti Torrez LPN

## 2021-06-21 NOTE — PROGRESS NOTES
ASSESSMENT/PLAN:    I have reviewed the nursing notes.  I have reviewed the findings, diagnosis, plan and need for follow up with the patient.    (N92.6) Missed periods  (primary encounter diagnosis)  Comment: see below  Plan: Pregnancy, Urine (HCG)        Vital signs stable. PE overall normal. UA qualitative returned and patient is not pregnant at this time based off results. In regards to birth control and menses, recommend follow up with OBGYN, did discuss that it can take time for cycles to regulate. No further concerns at this time. Patient is in agreement and understanding of the above treatment plan. All questions and concerns were addressed and answered to patient's satisfaction. AVS reviewed with patient.     Discussed warning signs/symptoms indicative of need to f/u    Follow up if symptoms persist or worsen or concerns    I explained my diagnostic considerations and recommendations to the patient, who voiced understanding and agreement with the treatment plan. All questions were answered. We discussed potential side effects of any prescribed or recommended therapies, as well as expectations for response to treatments.    Ana M Santillan PA-C  2021  6:52 PM    HPI:    Bebe Carl is a 20 year old female  who presents to [facility] today for signs of possible pregnancy after missing menstrual cycle positive home pregnancy test.  Last cycle start date was 5/3/2021.     Parity status -  1, Para 1  Home pregnancy test - Yes, positive  Current symptoms - breast tenderness and absence of menses  Abdominal pain - No  Vaginal symptoms, bleeding, spotting, discharge - No    Pertinent History:  Birth control use - Yes just started  Planned pregnancy - No  Taking a prenatal multivitamin - No  Concerns about STDs - No  Using tobacco - No  Using alcohol - No  Using recreational drugs - No  Additional symptoms: No    PCP: None    Past Medical History:   Diagnosis Date     Acute gastroenteritis  12/24/2019     Bipolar 1 disorder (H)      Viral gastroenteritis 12/24/2019     Past Surgical History:   Procedure Laterality Date     APPENDECTOMY       Social History     Tobacco Use     Smoking status: Former Smoker     Packs/day: 1.00     Smokeless tobacco: Never Used   Substance Use Topics     Alcohol use: No     Current Outpatient Medications   Medication Sig Dispense Refill     amphetamine-dextroamphetamine (ADDERALL) 15 MG tablet Take 15 mg by mouth daily       norgestim-eth estrad triphasic (ORTHO TRI-CYCLEN LO) 0.18/0.215/0.25 MG-25 MCG tablet Take 1 tablet by mouth daily 84 tablet 4     Allergies   Allergen Reactions     Sulfa Drugs Hives and Anaphylaxis     Atomoxetine GI Disturbance     Weight loss     Latex Rash     bandaides only     Past medical history, past surgical history, current medications and allergies reviewed and accurate to the best of my knowledge.      ROS:  Refer to HPI    /76 (BP Location: Left arm, Patient Position: Sitting, Cuff Size: Adult Regular)   Pulse 90   Resp 18   Ht 1.524 m (5')   Wt 66.7 kg (147 lb)   LMP 05/23/2021   SpO2 97%   Breastfeeding No   BMI 28.71 kg/m      EXAM:  General Appearance: Well appearing 20-year old female, appropriate appearance for age. No acute distress  Respiratory: normal chest wall and respirations.  Normal effort.  Clear to auscultation bilaterally, no wheezing, crackles or rhonchi.  No increased work of breathing.  No cough appreciated.  Cardiac: RRR with no murmurs  Abdomen: soft, nontender, no rigidity, no rebound tenderness or guarding, normal bowel sounds present  :  No suprapubic tenderness to palpation.  No CVA tenderness to palpation.    Psychological: normal affect, alert, oriented, and pleasant.     Labs:  Negative urine pregnancy    Xray:  None

## 2021-08-09 ENCOUNTER — ALLIED HEALTH/NURSE VISIT (OUTPATIENT)
Dept: OBGYN | Facility: OTHER | Age: 21
End: 2021-08-09
Attending: OBSTETRICS & GYNECOLOGY
Payer: COMMERCIAL

## 2021-08-09 VITALS — HEIGHT: 62 IN | WEIGHT: 149 LBS | BODY MASS INDEX: 27.42 KG/M2

## 2021-08-09 DIAGNOSIS — Z32.01 PREGNANCY EXAMINATION OR TEST, POSITIVE RESULT: Primary | ICD-10-CM

## 2021-08-09 LAB
B-HCG SERPL-ACNC: <1 IU/L
HCG UR QL: NEGATIVE
PROGEST SERPL-MCNC: 1.3 NG/ML

## 2021-08-09 PROCEDURE — 81025 URINE PREGNANCY TEST: CPT | Mod: ZL

## 2021-08-09 PROCEDURE — 36415 COLL VENOUS BLD VENIPUNCTURE: CPT | Mod: ZL

## 2021-08-09 PROCEDURE — 84702 CHORIONIC GONADOTROPIN TEST: CPT | Mod: ZL

## 2021-08-09 PROCEDURE — 84144 ASSAY OF PROGESTERONE: CPT | Mod: ZL

## 2021-08-09 ASSESSMENT — PAIN SCALES - GENERAL: PAINLEVEL: NO PAIN (0)

## 2021-08-09 ASSESSMENT — MIFFLIN-ST. JEOR: SCORE: 1399.11

## 2021-08-09 NOTE — PROGRESS NOTES
"HPI:    This is a 20 year old female patient,  who presents for Pregnancy confirmation visit. Patient reports positive pregnancy test at home, questionable faint line on test. Has had negative result since. Period did start 2021 for 4 day duration post making intake appointment. Patient states her period was abnormal including dark bleeding. Denies urinary concerns. States discharge has been increased and \"weird\", difficult to describe for patient but denies foul odor. Patient states had heavy periods with 1st pregnancy, while pregnancy. Did stop birth control after positive home test on 2021 otherwise had been taking consistently since LOV 2021      Results for orders placed or performed in visit on 21   Pregnancy, Urine (HCG)     Status: Normal   Result Value Ref Range    hCG Urine Qualitative Negative Negative      Discussed results with patient, desires confirmation blood work for pregnancy. Appropriate given questionable home result, orders placed. Will call patient with results and discuss further plan of care. Patient agrees with plan and has no further questions or concerns at this time. Carmelina Fleming RN ....................  2021   2:18 PM      "

## 2021-09-30 NOTE — PROGRESS NOTES
"  Assessment & Plan     1. Missed period  Discussed negative urine pregnancy in clinic.  Discussed contraceptive options if she is not wanting to conceive at this time.  She is due for her period any day and if she misses it by a few weeks would recommend follow-up for repeat urine pregnancy test.  - Pregnancy, Urine (HCG)      Return if symptoms worsen or fail to improve.    Anabel Campbell PA-C  [facility]    [facility]   Bebe is a 20 year old who presents for the following health issues     HPI   Here requesting a urine pregnancy test.  Reports her last menstrual period was 9/4/2021.  She is usually pretty regular.  She is not on any form of contraceptive.  She is sexually active with 1 male partner and they have had unprotected sex.  They use the pullout method and condoms occasionally.  She took an at home urine pregnancy test a couple days ago which came back with a \"?\".  She took another one today and that came back positive.  She reports she has not wanting another pregnancy at this time.  She will return to the clinic in a couple weeks if she does not get her period as she is due for it any day.  Denies STD concerns.          PAST MEDICAL HISTORY:   Past Medical History:   Diagnosis Date     Acute gastroenteritis 12/24/2019     Bipolar 1 disorder (H)      Viral gastroenteritis 12/24/2019       PAST SURGICAL HISTORY:   Past Surgical History:   Procedure Laterality Date     APPENDECTOMY         FAMILY HISTORY: No family history on file.    SOCIAL HISTORY:   Social History     Tobacco Use     Smoking status: Former Smoker     Packs/day: 1.00     Smokeless tobacco: Never Used   Substance Use Topics     Alcohol use: No        Allergies   Allergen Reactions     Sulfa Drugs Hives and Anaphylaxis     Atomoxetine GI Disturbance     Weight loss     Latex Rash     bandaides only     Current Outpatient Medications   Medication     amphetamine-dextroamphetamine (ADDERALL) 15 MG tablet     " sertraline (ZOLOFT) 25 MG tablet     No current facility-administered medications for this visit.         Review of Systems   Per HPI        Objective    /60 (BP Location: Right arm, Patient Position: Sitting, Cuff Size: Adult Large)   Pulse 80   Temp 99  F (37.2  C) (Tympanic)   Resp 16   Wt 71.7 kg (158 lb)   LMP 09/04/2021 (Exact Date)   SpO2 98%   BMI 28.90 kg/m    Body mass index is 28.9 kg/m .  Physical Exam   General: Pleasant, in no apparent distress.  Psych: Appropriate mood and affect.    Results for orders placed or performed in visit on 10/01/21   Pregnancy, Urine (HCG)     Status: Normal   Result Value Ref Range    hCG Urine Qualitative Negative Negative

## 2021-10-01 ENCOUNTER — OFFICE VISIT (OUTPATIENT)
Dept: FAMILY MEDICINE | Facility: OTHER | Age: 21
End: 2021-10-01
Attending: PHYSICIAN ASSISTANT
Payer: COMMERCIAL

## 2021-10-01 VITALS
DIASTOLIC BLOOD PRESSURE: 60 MMHG | RESPIRATION RATE: 16 BRPM | WEIGHT: 158 LBS | OXYGEN SATURATION: 98 % | BODY MASS INDEX: 28.9 KG/M2 | TEMPERATURE: 99 F | SYSTOLIC BLOOD PRESSURE: 112 MMHG | HEART RATE: 80 BPM

## 2021-10-01 DIAGNOSIS — N92.6 MISSED PERIOD: Primary | ICD-10-CM

## 2021-10-01 LAB — HCG UR QL: NEGATIVE

## 2021-10-01 PROCEDURE — 81025 URINE PREGNANCY TEST: CPT | Mod: ZL | Performed by: PHYSICIAN ASSISTANT

## 2021-10-01 PROCEDURE — 99213 OFFICE O/P EST LOW 20 MIN: CPT | Performed by: PHYSICIAN ASSISTANT

## 2021-10-01 PROCEDURE — G0463 HOSPITAL OUTPT CLINIC VISIT: HCPCS

## 2021-10-01 RX ORDER — SERTRALINE HYDROCHLORIDE 25 MG/1
TABLET, FILM COATED ORAL
COMMUNITY
Start: 2021-08-24 | End: 2022-02-15

## 2021-10-01 ASSESSMENT — PAIN SCALES - GENERAL: PAINLEVEL: NO PAIN (0)

## 2021-10-01 NOTE — NURSING NOTE
"Chief Complaint   Patient presents with     Confirmation Of Pregnancy     wanting pregnancy test       Initial /60 (BP Location: Right arm, Patient Position: Sitting, Cuff Size: Adult Large)   Pulse 80   Temp 99  F (37.2  C) (Tympanic)   Resp 16   Wt 71.7 kg (158 lb)   LMP 09/04/2021 (Exact Date)   SpO2 98%   BMI 28.90 kg/m   Estimated body mass index is 28.9 kg/m  as calculated from the following:    Height as of 8/9/21: 1.575 m (5' 2\").    Weight as of this encounter: 71.7 kg (158 lb).  Medication Reconciliation: complete    FOOD SECURITY SCREENING QUESTIONS  Hunger Vital Signs:  Within the past 12 months we worried whether our food would run out before we got money to buy more. Never  Within the past 12 months the food we bought just didn't last and we didn't have money to get more. Never  Karrie Valiente LPN 10/1/2021 1:39 PM  "

## 2021-10-10 ENCOUNTER — OFFICE VISIT (OUTPATIENT)
Dept: FAMILY MEDICINE | Facility: OTHER | Age: 21
End: 2021-10-10
Attending: FAMILY MEDICINE
Payer: COMMERCIAL

## 2021-10-10 VITALS
WEIGHT: 161 LBS | RESPIRATION RATE: 16 BRPM | SYSTOLIC BLOOD PRESSURE: 104 MMHG | OXYGEN SATURATION: 98 % | DIASTOLIC BLOOD PRESSURE: 62 MMHG | HEART RATE: 92 BPM | HEIGHT: 61 IN | BODY MASS INDEX: 30.4 KG/M2 | TEMPERATURE: 98.9 F

## 2021-10-10 DIAGNOSIS — N91.2 AMENORRHEA: Primary | ICD-10-CM

## 2021-10-10 LAB — HCG UR QL: POSITIVE

## 2021-10-10 PROCEDURE — 81025 URINE PREGNANCY TEST: CPT | Mod: ZL | Performed by: FAMILY MEDICINE

## 2021-10-10 PROCEDURE — 99213 OFFICE O/P EST LOW 20 MIN: CPT | Performed by: FAMILY MEDICINE

## 2021-10-10 PROCEDURE — G0463 HOSPITAL OUTPT CLINIC VISIT: HCPCS

## 2021-10-10 RX ORDER — PRENATAL VIT/IRON FUM/FOLIC AC 27MG-0.8MG
1 TABLET ORAL DAILY
Qty: 90 TABLET | Refills: 3 | Status: SHIPPED | OUTPATIENT
Start: 2021-10-10 | End: 2022-07-19

## 2021-10-10 ASSESSMENT — PAIN SCALES - GENERAL: PAINLEVEL: NO PAIN (0)

## 2021-10-10 ASSESSMENT — MIFFLIN-ST. JEOR: SCORE: 1437.67

## 2021-10-10 NOTE — NURSING NOTE
"Chief Complaint   Patient presents with     Pregnancy Test     Patient is here for a pregnancy test. Patient states her LPM was 9/4/21.    Initial /62   Pulse 92   Temp 98.9  F (37.2  C) (Tympanic)   Resp 16   Ht 1.549 m (5' 1\")   Wt 73 kg (161 lb)   LMP 09/04/2021 (Exact Date)   SpO2 98%   Breastfeeding No   BMI 30.42 kg/m   Estimated body mass index is 30.42 kg/m  as calculated from the following:    Height as of this encounter: 1.549 m (5' 1\").    Weight as of this encounter: 73 kg (161 lb).  Medication Reconciliation: complete    Tiffanie Christopher LPN  "

## 2021-10-10 NOTE — PATIENT INSTRUCTIONS
Patient Education     Established Pregnancy, Normal Symptoms    You are pregnant and are having symptoms that worry you. During pregnancy, it s normal to have many kinds of symptoms. Here is a list of common symptoms that happen during pregnancy.   Circulation changes    Bleeding gums    Headaches    Nosebleeds    Mild blurriness of vision, especially with contact lenses    Stuffy nose    Dizziness and fainting    Extra saliva    Skin color changes on your face    Stuffy nose    Swollen hands, legs, and feet    Swollen leg veins    Breast and skin changes    Darkening of nipples    Yellow or white discharge from the nipples    Sore breasts and nipples    Swollen breasts    Dry, itchy skin    Skin color changes on your face    Muscle and joint changes    Back, hip, or thigh pain    Leg cramps that come and go    Numbness and tingling in your hands and fingers    Urinary and bowel changes    Constipation    Feeling of pressure on your bladder and stomach    Need to urinate often    Gas and bloating    Heartburn    Anal itching, swelling, and bleeding (hemorrhoids)    Leaking urine    Mild pressure or cramping in your belly    Nausea and vomiting throughout the day or night (morning sickness)    Swollen belly    Clear to white vaginal discharge    Mood and thinking changes    Forgetfulness    Less interest in sex    Mood swings    Tiredness    Trouble sleeping    Home care  Here is information that may help relieve some common pregnancy symptoms.   Sore and swollen breasts    Wear a support bra that fits properly.  Nausea and indigestion    Eat smaller meals or snacks more often.    Eat bland foods, such as bananas, crackers, or rice.    Stay away from spicy, fatty, or fried foods.    Stay away from alcohol, caffeine, and tobacco.    Don t lie down right after eating.    Raise your head with pillows when you lie down.    Eat foods or beverages that have ginger. If you drink ginger ale, be sure to make sure it has  real ajay and not just ajay flavoring.  Leg swelling and varicose veins     Wear elastic support hose. Put your feet up as often as possible.  Constipation    Eat more fresh fruits and vegetables and more whole grains. Drink more clear liquids.  Joint and muscle pain    Avoid heavy lifting.    Pick things up by bending at your knees, not at your waist.    Use acetaminophen for joint and muscle pain. Don't use aspirin, ibuprofen, or naproxen.  Mouth and nose dryness or bleeding     Drink more liquids. Use a vaporizer or humidifier in your bedroom.  Don t take medicines or use remedies that your healthcare provider hasn t approved. If you have symptoms that are severe or sudden, call your healthcare provider.   Call 911  Call 911 if any of these occur:     New chest, arm, shoulder, neck, or upper back pain    Trouble breathing    Severe belly pain or very heavy bleeding    Severe lightheadedness, passing out, or fainting    Rapid heart rate    Confusion or trouble waking up  When to seek medical advice  Call your healthcare provider right away if any of these occur:     Burning or pain when you urinate    Depression or severe anxiety    Desire to eat or drink nonfood items such as paper, dirt, or cleaning products    Diarrhea that lasts more than 24 hours    Fast heartbeat or heart palpitations    Fever of 100.4 F (38 C) or higher, or as directed by your healthcare provider    You can t keep fluids down for 6 hours without vomiting    Severe or ongoing vomiting    Little or no urine    Major vision changes    Moderate or severe belly pain    Severe back pain    Severe constipation    Severe cramping or swelling in a leg, especially if it s just on one side    Severe headache    Sudden swelling of your face, hands, feet, or ankles    Vaginal bleeding    Very itchy skin that doesn t get better  [employer] last reviewed this educational content on 11/1/2019 2000-2021 [employer]. All rights reserved.  This information is not intended as a substitute for professional medical care. Always follow your healthcare professional's instructions.

## 2021-10-10 NOTE — PROGRESS NOTES
"Nursing Notes:   Tiffanie Christopher LPN  10/10/2021  3:36 PM  Signed  Chief Complaint   Patient presents with     Pregnancy Test     Patient is here for a pregnancy test. Patient states her LPM was 9/4/21.    Initial /62   Pulse 92   Temp 98.9  F (37.2  C) (Tympanic)   Resp 16   Ht 1.549 m (5' 1\")   Wt 73 kg (161 lb)   LMP 09/04/2021 (Exact Date)   SpO2 98%   Breastfeeding No   BMI 30.42 kg/m   Estimated body mass index is 30.42 kg/m  as calculated from the following:    Height as of this encounter: 1.549 m (5' 1\").    Weight as of this encounter: 73 kg (161 lb).  Medication Reconciliation: complete    Tiffanieanil Christopher LPN     Patient in today for pregnancy verification. Uncertain if pregnant with many home tests done.   LMP: 9/4/2021   Sure/Normal Yes  Had an episode of condom breaking and took Plan B in last week.   Home pregnancy test: several + and several question.    Risk factors reviewed: Nonsmoker.     Exam:    Vital signs:  Temp: 98.9  F (37.2  C) Temp src: Tympanic BP: 104/62 Pulse: 92   Resp: 16 SpO2: 98 %     Height: 154.9 cm (5' 1\") Weight: 73 kg (161 lb)  Estimated body mass index is 30.42 kg/m  as calculated from the following:    Height as of this encounter: 1.549 m (5' 1\").    Weight as of this encounter: 73 kg (161 lb).      Results for orders placed or performed in visit on 10/10/21   Pregnancy, Urine (HCG)     Status: Abnormal   Result Value Ref Range    hCG Urine Qualitative Positive (A) Negative     I have personally reviewed the labs listed above.    Assessment:  1. Amenorrhea    2. Visit for confirmation of pregnancy test result without physical exam          Plan:  Prenatal vitamin prescription sent.  Contact OB department to set up prenatal visits.     Rtia Foster MD ....................  10/10/2021   3:41 PM    "

## 2021-10-13 ENCOUNTER — HOSPITAL ENCOUNTER (EMERGENCY)
Facility: OTHER | Age: 21
Discharge: HOME | End: 2021-10-14
Attending: FAMILY MEDICINE | Admitting: FAMILY MEDICINE
Payer: COMMERCIAL

## 2021-10-13 ENCOUNTER — NURSE TRIAGE (OUTPATIENT)
Dept: NURSING | Facility: CLINIC | Age: 21
End: 2021-10-13

## 2021-10-13 VITALS
TEMPERATURE: 98 F | DIASTOLIC BLOOD PRESSURE: 71 MMHG | OXYGEN SATURATION: 99 % | HEART RATE: 94 BPM | HEIGHT: 61 IN | SYSTOLIC BLOOD PRESSURE: 98 MMHG | RESPIRATION RATE: 18 BRPM | WEIGHT: 160 LBS | BODY MASS INDEX: 30.21 KG/M2

## 2021-10-13 DIAGNOSIS — S40.861A TICK BITE OF RIGHT UPPER ARM, INITIAL ENCOUNTER: ICD-10-CM
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
## 2021-10-13 PROCEDURE — 99283 EMERGENCY DEPT VISIT LOW MDM: CPT | Performed by: FAMILY MEDICINE

## 2021-10-13 PROCEDURE — 86618 LYME DISEASE ANTIBODY: CPT | Performed by: FAMILY MEDICINE

## 2021-10-13 PROCEDURE — 36415 COLL VENOUS BLD VENIPUNCTURE: CPT | Performed by: FAMILY MEDICINE

## 2021-10-13 PROCEDURE — 250N000013 HC RX MED GY IP 250 OP 250 PS 637: Performed by: FAMILY MEDICINE

## 2021-10-13 PROCEDURE — 87798 DETECT AGENT NOS DNA AMP: CPT | Performed by: FAMILY MEDICINE

## 2021-10-13 RX ORDER — DOXYCYCLINE 100 MG/1
200 CAPSULE ORAL ONCE
Status: COMPLETED | OUTPATIENT
Start: 2021-10-13 | End: 2021-10-13

## 2021-10-13 RX ADMIN — DOXYCYCLINE 200 MG: 100 CAPSULE ORAL at 23:20

## 2021-10-13 ASSESSMENT — ENCOUNTER SYMPTOMS
SORE THROAT: 0
CHILLS: 0
VOMITING: 0
SHORTNESS OF BREATH: 0
ABDOMINAL PAIN: 0
WOUND: 1
COUGH: 0
COLOR CHANGE: 1
NECK PAIN: 0
MYALGIAS: 0
FEVER: 0
NAUSEA: 0
ARTHRALGIAS: 0
HEADACHES: 0
DIARRHEA: 0
DYSURIA: 0

## 2021-10-13 ASSESSMENT — MIFFLIN-ST. JEOR: SCORE: 1433.14

## 2021-10-13 NOTE — TELEPHONE ENCOUNTER
"Pregnant five weeks and pulled a deer tick stuck in arm.  \"Hasn't been there more than a day.\"   Was able to remove including head, and tick is flat.  There is a \"bruise\" but denies spreading redness and not really tender.      Home instructions given per guideline and patient verbalizes understanding.    Katia Tovar RN  [facility]      Reason for Disposition    Tick bite with no complications    Additional Information    Negative: Sounds like a life-threatening emergency to the triager    Negative: [1] 2 to 14 days following tick bite AND [2] severe headache with fever occurs    Negative: [1] 2 to 14 days following tick bite AND [2] widespread rash with fever occurs    Negative: Patient sounds very sick or weak to the triager    Negative: [1] Fever AND [2] red area    Negative: [1] Fever AND [2] area is very tender to touch    Negative: [1] Red streak or red line AND [2] length > 2 inches (5 cm)    Negative: Can't remove live tick (after trying Care Advice)    Negative: Can't remove tick's head that was broken off in the skin (after trying Care Advice)    Negative: [1] 2 to 14 days following tick bite AND [2] fever AND [3] no rash or headache    Negative: [1] 2 to 14 days following tick bite AND [2] widespread rash or headache AND [3] no fever    Negative: [1] Probable deer tick AND [2] attached > 36 hours (or tick appears swollen, not flat) AND [3] occurred in an area where Lyme disease is common    Negative: [1] Red or very tender (to touch) area AND [2] started over 24 hours after the bite    Negative: Red ring or bull's-eye rash occurs at tick bite    Negative: [1] Scab is present AND [2] it drains pus or increases in size AND [3] not improved after applying antibiotic ointment for 2 days    Protocols used: TICK BITE-A-      "

## 2021-10-14 LAB — B BURGDOR IGG+IGM SER QL: 0.12

## 2021-10-14 NOTE — ED PROVIDER NOTES
History     Chief Complaint   Patient presents with     Insect Bite     HPI  Bebe Carl is a 20 year old female who presents to the emergency room for evaluation treatment of a tick bite.  She states that she found a deer tick that was embedded in the inner upper right arm.  She was able to remove the tick successfully but is concerned about developing Lyme disease.  She does have redness at the site where the tick was.  She does not have any other associated rash.  She denies any fever, sweats, chills or other symptoms.  No further questions or complaints today.    Allergies:  Allergies   Allergen Reactions     Sulfa Drugs Hives and Anaphylaxis     Atomoxetine GI Disturbance     Weight loss     Latex Rash     bandaides only       Problem List:    Patient Active Problem List    Diagnosis Date Noted     Normal labor and delivery 2020     Priority: Medium      (normal spontaneous vaginal delivery) 2020     Priority: Medium     Acute gastroenteritis 2019     Priority: Medium     Dehydration 2019     Priority: Medium      contractions 2019     Priority: Medium     Encounter for triage in pregnant patient 10/01/2019     Priority: Medium     ADHD 2018     Priority: Medium     Appendicitis with abscess 2017     Priority: Medium     Mononucleosis 2017     Priority: Medium     Ruptured suppurative appendicitis 2017     Priority: Medium     Controlled substance agreement signed 2016     Priority: Medium        Past Medical History:    Past Medical History:   Diagnosis Date     Acute gastroenteritis 2019     Bipolar 1 disorder (H)      Viral gastroenteritis 2019       Past Surgical History:    Past Surgical History:   Procedure Laterality Date     APPENDECTOMY         Family History:    No family history on file.    Social History:  Marital Status:  Single [1]  Social History     Tobacco Use     Smoking status: Former Smoker     Packs/day:  "1.00     Smokeless tobacco: Never Used   Vaping Use     Vaping Use: Every day     Substances: Nicotine     Devices: Disposable   Substance Use Topics     Alcohol use: No     Drug use: No        Medications:    amphetamine-dextroamphetamine (ADDERALL) 15 MG tablet  Prenatal Vit-Fe Fumarate-FA (PRENATAL MULTIVITAMIN W/IRON) 27-0.8 MG tablet  sertraline (ZOLOFT) 25 MG tablet          Review of Systems   Constitutional: Negative for chills and fever.   HENT: Negative for congestion and sore throat.    Eyes: Negative for visual disturbance.   Respiratory: Negative for cough and shortness of breath.    Cardiovascular: Negative for chest pain.   Gastrointestinal: Negative for abdominal pain, diarrhea, nausea and vomiting.   Genitourinary: Negative for dysuria.   Musculoskeletal: Negative for arthralgias, myalgias and neck pain.   Skin: Positive for color change and wound.   Neurological: Negative for headaches.       Physical Exam   BP: 98/71  Pulse: 94  Temp: 98  F (36.7  C)  Resp: 18  Height: 154.9 cm (5' 1\")  Weight: 72.6 kg (160 lb)  SpO2: 99 %      Physical Exam  Vitals and nursing note reviewed.   Constitutional:       General: She is not in acute distress.     Appearance: Normal appearance. She is well-developed and normal weight. She is not diaphoretic.   HENT:      Head: Normocephalic and atraumatic.      Right Ear: External ear normal.      Left Ear: External ear normal.      Nose: Nose normal.      Mouth/Throat:      Mouth: Mucous membranes are moist.      Pharynx: Oropharynx is clear.   Eyes:      Extraocular Movements: Extraocular movements intact.      Conjunctiva/sclera: Conjunctivae normal.      Pupils: Pupils are equal, round, and reactive to light.   Neck:      Thyroid: No thyromegaly.   Cardiovascular:      Rate and Rhythm: Normal rate and regular rhythm.      Pulses: Normal pulses.      Heart sounds: Normal heart sounds. No murmur heard.     Pulmonary:      Effort: Pulmonary effort is normal.      " Breath sounds: Normal breath sounds.   Abdominal:      General: Bowel sounds are normal.      Palpations: Abdomen is soft.   Musculoskeletal:         General: Normal range of motion.      Cervical back: Normal range of motion and neck supple.      Right lower leg: No edema.      Left lower leg: No edema.   Lymphadenopathy:      Cervical: No cervical adenopathy.   Skin:     General: Skin is warm.      Capillary Refill: Capillary refill takes less than 2 seconds.      Findings: Erythema present. No rash.             Comments: Small erythematous area surrounding a central punctum in the inner right upper arm where the tick bite is.  No associated target rash is present at the site or around any other areas of the body.   Neurological:      Mental Status: She is alert and oriented to person, place, and time.   Psychiatric:         Mood and Affect: Mood normal.         Behavior: Behavior normal. Behavior is cooperative.         ED Course     Procedures      Critical Care time:  none    No results found for this or any previous visit (from the past 24 hour(s)).    Medications   doxycycline hyclate (VIBRAMYCIN) capsule 200 mg (200 mg Oral Given 10/13/21 4534)       Assessments & Plan (with Medical Decision Making)     I have reviewed the nursing notes.  Labs for Lyme disease and anaplasmosis are drawn.  The patient is treated empirically for prophylaxis of Lyme disease with doxycycline 200 mg orally x1 dose.    Patient is discharged home in good condition.  Follow-up with primary care physician.  I have reviewed the findings, diagnosis, plan and need for follow up with the patient.    New Prescriptions    No medications on file       Final diagnoses:   Tick bite of right upper arm, initial encounter       10/13/2021   [facility] AND [facility]     Mir Sosa MD  10/13/21 2294

## 2021-10-14 NOTE — ED TRIAGE NOTES
ED Nursing Triage Note (General)   ________________________________    Bebe Carl is a 20 year old Female that presents to triage private car  With history of  Deer tick bite right upper arm, pt thinks it was in about a day but does not know, pt is also 5 weeks pregnant,  reported by patient   Significant symptoms had onset 5 hour(s) ago.    Patient appears alert , in no acute distress., and cooperative behavior.    GCS Total = 15  Airway: intact  Breathing noted as Normal  Circulation Normal  Skin:  Abnormal - redness with scab present on right arm      PRE HOSPITAL PRIOR LIVING SITUATION Children Only

## 2021-10-15 NOTE — RESULT ENCOUNTER NOTE
Final result for Lyme Disease Pia with reflex to WB Serum is NEGATIVE.    No change in treatment per [facility] ED Lab Result Lyme Disease protocol.

## 2021-10-18 ENCOUNTER — ALLIED HEALTH/NURSE VISIT (OUTPATIENT)
Dept: OBGYN | Facility: OTHER | Age: 21
End: 2021-10-18
Attending: OBSTETRICS & GYNECOLOGY
Payer: COMMERCIAL

## 2021-10-18 VITALS — WEIGHT: 158.3 LBS | HEIGHT: 63 IN | BODY MASS INDEX: 28.05 KG/M2

## 2021-10-18 DIAGNOSIS — O36.80X0 ENCOUNTER TO DETERMINE FETAL VIABILITY OF PREGNANCY: ICD-10-CM

## 2021-10-18 DIAGNOSIS — Z32.01 PREGNANCY EXAMINATION OR TEST, POSITIVE RESULT: Primary | ICD-10-CM

## 2021-10-18 LAB
A PHAGOCYTOPH DNA BLD QL NAA+PROBE: NOT DETECTED
E CHAFFEENSIS DNA BLD QL NAA+PROBE: NOT DETECTED
E EWINGII DNA SPEC QL NAA+PROBE: NOT DETECTED
EHRLICHIA DNA SPEC QL NAA+PROBE: NOT DETECTED

## 2021-10-18 PROCEDURE — 99207 PR OB VISIT-NO CHARGE - GICH ONLY: CPT

## 2021-10-18 ASSESSMENT — MIFFLIN-ST. JEOR: SCORE: 1449.23

## 2021-10-18 ASSESSMENT — PATIENT HEALTH QUESTIONNAIRE - PHQ9: SUM OF ALL RESPONSES TO PHQ QUESTIONS 1-9: 17

## 2021-10-18 NOTE — RESULT ENCOUNTER NOTE
Final result for Ehrlichia Anaplasma sp by PCR is NEGATIVE for A. phagocytophilium, E. Chaffeenis, E. ewingii/canis and E. muris-like.  No change in treatment per [facility] ED lab result Anaplasmosis/Ehrlichiosis protocol.

## 2021-10-18 NOTE — PROGRESS NOTES
HPI:    This is a 20 year old female patient,  who presents today for OB Intake visit. Patient reports positive pregnancy test at home.     Obstetrical history and OB Questionnaire updated to the best of this nurse's ability based on patient report. PHQ-9 depression screening and routine Domestic Abuse screening completed. All immediate questions and concerns answered.    FOOD SECURITY SCREENING QUESTIONS  Hunger Vital Signs:  Within the past 12 months we worried whether our food would run out before we got money to buy more. Never  Within the past 12 months the food we bought just didn't last and we didn't have money to get more. Never    Last menstrual period is reported as Patient's last menstrual period was 2021. DEJON based on LMP is Estimated Date of Delivery: 2022.  Her cycles are regular.  Her last menstrual period was normal.   Since her LMP, she has experienced  nausea.       OBSTETRIC HISTORY:    OB History    Para Term  AB Living   2 1 1 0 0 1   SAB TAB Ectopic Multiple Live Births   0 0 0 0 1      # Outcome Date GA Lbr Alistair/2nd Weight Sex Delivery Anes PTL Lv   2 Current            1 Term 20 39w6d 11:38 / 03:00 4.065 kg (8 lb 15.4 oz) F Vag-Spont EPI, IV, Local N SARAH      Complications: Other Excessive Bleeding      Name: QIANA ALBA-DEBBIE      Apgar1: 9  Apgar5: 9       Age of first pregnancy: 18  Previous OB Provider: GENET delivered [facility] care  Previous Delivering Clinic: [facility]  Release of Records: MADONNA    Current delivery plan: [facility]  Preferred OB Provider: LACEY  Current Primary Care Provider: MADONNA  Pediatrician: CIELO    Additional History: Had taken plan B for broken condom in the beginning of October.     Have you travelled during the pregnancy?No  Have your sexual partner(s) travelled during the pregnancy?No      HISTORY:   Planned Pregnancy: No  Marital Status: Single  Occupation: [employer]   Living in Household: Children Only    Father of the baby  does not know about pregnancy.   Family and father of baby is unaware of current pregnancy.  Past Medical History of Father of Baby:No significant medical history    Past History:  Her past medical history   Past Medical History:   Diagnosis Date     Acute gastroenteritis 2019     Bipolar 1 disorder (H)      Viral gastroenteritis 2019   .      Her past surgical history:   Past Surgical History:   Procedure Laterality Date     APPENDECTOMY         She has a history of  Significant bleeding requiring transfusion with last delivery. A long second degree tear present, longterm to cervix with last delivery.     Since her LMP she denies use of alcohol, tobacco and street drugs.    Pap smear history: NO - under age 21, PAP not appropriate for age    STD/STI history: Trichomonas    STD/STI symptoms: no noticeable symptoms     Past medical, surgical, social and family history were reviewed and updated in EPIC.    Medications reviewed by this nurse. Current medication list:  Current Outpatient Medications   Medication Sig Dispense Refill     amphetamine-dextroamphetamine (ADDERALL) 15 MG tablet Take 15 mg by mouth daily       Prenatal Vit-Fe Fumarate-FA (PRENATAL MULTIVITAMIN W/IRON) 27-0.8 MG tablet Take 1 tablet by mouth daily 90 tablet 3     sertraline (ZOLOFT) 25 MG tablet        The following medications were recommended to be discontinued due to Pregnancy Category D status: None stopped adderall   Patient informed to contact her primary care provider as soon as possible to discuss a safer alternative.    Risk factors:  Moderate and moderately severe risks (consult with OB/Gyn)  Previous fetal or  demise: No  History of  delivery: No  History of heart disease Class I: No  Severe anemia, unresponsive to iron therapy: No  Pelvic mass or neoplasm: No  Previous : No  Hyper/hypothyroidism: No  History of postpartum hemorrhage requiring transfusion:Yes  History of Placenta Accreta: No    High  Risk (Pregnancy managed by OB/Gyn)  Multiple pregnancy: No  Pre-gestational diabetes: No  Chronic Hypertension: No  Renal Failure: No  Heart disease, class II or greater: No  Rh Isoimmunization: No  Chronic active hepatitis: No  Convulsive disorder, poorly controlled: No  Isoimmune thrombocytopenia: No  Pre-term premature rupture of membranes: No  Lupus or other autoimmune disorder: No  Human Immunodeficiency Virus: No    HCG Qual Urine   Date Value Ref Range Status   2021 Negative NEG^Negative Final     Comment:     This test is for screening purposes.  Results should be interpreted along with   the clinical picture.  Confirmation testing is available if warranted by   ordering [id number], HCG Quantitative Pregnancy.       hCG Urine Qualitative   Date Value Ref Range Status   10/10/2021 Positive (A) Negative Final     Comment:     This test is for screening purposes.  Results should be interpreted along with the clinical picture.  Confirmation testing is available if warranted by ordering [id number], HCG Quantitative Pregnancy.       ASSESSMENT/PLAN:       ICD-10-CM    1. Pregnancy examination or test, positive result  Z32.01    2. Encounter to determine fetal viability of pregnancy  O36.80X0        20 year old , 6w2d of pregnancy with DEJON of 2022, by Last Menstrual Period    Urine pregnancy test was completed today and results are noted above.    Per standing orders and scope of practice of this nurse, patient will have the following orders placed and completed prior to initial OB visit with the appropriate provider:    --early ultrasound for dating and viability ordered for 6+ weeks gestation based on LMP    --Quantitative Beta HCG and progesterone monitoring if indicated    Counseling given:     - Recommended weight gain for pregnancy: 15-25 lbs.   BMI < 18.5  28-40 lbs   18.5 - 24.9 25-35   25 - 29.9 15-25   > 30  < 15       PLAN/PATIENT INSTRUCTIONS:    Normal exercise.  Normal sexual  activity.  Prenatal vitamins.  Anticipated weight gain.  Advised of crisis information and contacting clinic/ED if any suicidal concerns. Pt previously in counseling and feels she has good handle on mood with alternative coping mechanisms.     follow-up appointment with Dr. RAHMAN for pre- care and take multivitamin or pre-kevin vitamins    Laura Gonzalez RN.................................................. 10/18/2021 1:37 PM

## 2021-10-30 ENCOUNTER — HOSPITAL ENCOUNTER (EMERGENCY)
Facility: HOSPITAL | Age: 21
Discharge: HOME | End: 2021-10-30
Attending: NURSE PRACTITIONER | Admitting: NURSE PRACTITIONER
Payer: COMMERCIAL

## 2021-10-30 VITALS
RESPIRATION RATE: 16 BRPM | TEMPERATURE: 98.6 F | OXYGEN SATURATION: 97 % | HEART RATE: 82 BPM | SYSTOLIC BLOOD PRESSURE: 96 MMHG | DIASTOLIC BLOOD PRESSURE: 65 MMHG

## 2021-10-30 DIAGNOSIS — R20.2 PARESTHESIAS: Primary | ICD-10-CM

## 2021-10-30 DIAGNOSIS — Z33.1: ICD-10-CM

## 2021-10-30 LAB
ALBUMIN SERPL-MCNC: 3.8 G/DL (ref 3.4–5)
ALBUMIN UR-MCNC: NEGATIVE MG/DL
ALP SERPL-CCNC: 60 U/L (ref 40–150)
ALT SERPL W P-5'-P-CCNC: 11 U/L (ref 0–50)
ANION GAP SERPL CALCULATED.3IONS-SCNC: 8 MMOL/L (ref 3–14)
APPEARANCE UR: ABNORMAL
AST SERPL W P-5'-P-CCNC: 11 U/L (ref 0–45)
BASOPHILS # BLD AUTO: 0 10E3/UL (ref 0–0.2)
BASOPHILS NFR BLD AUTO: 0 %
BILIRUB SERPL-MCNC: 1.2 MG/DL (ref 0.2–1.3)
BILIRUB UR QL STRIP: NEGATIVE
BUN SERPL-MCNC: 9 MG/DL (ref 7–30)
CALCIUM SERPL-MCNC: 9 MG/DL (ref 8.5–10.1)
CHLORIDE BLD-SCNC: 106 MMOL/L (ref 94–109)
CO2 SERPL-SCNC: 22 MMOL/L (ref 20–32)
COLOR UR AUTO: YELLOW
CREAT SERPL-MCNC: 0.62 MG/DL (ref 0.52–1.04)
EOSINOPHIL # BLD AUTO: 0.2 10E3/UL (ref 0–0.7)
EOSINOPHIL NFR BLD AUTO: 2 %
ERYTHROCYTE [DISTWIDTH] IN BLOOD BY AUTOMATED COUNT: 11.7 % (ref 10–15)
GFR SERPL CREATININE-BSD FRML MDRD: >90 ML/MIN/1.73M2
GLUCOSE BLD-MCNC: 88 MG/DL (ref 70–99)
GLUCOSE UR STRIP-MCNC: NEGATIVE MG/DL
HCT VFR BLD AUTO: 39.5 % (ref 35–47)
HGB BLD-MCNC: 14 G/DL (ref 11.7–15.7)
HGB UR QL STRIP: NEGATIVE
IMM GRANULOCYTES # BLD: 0 10E3/UL
IMM GRANULOCYTES NFR BLD: 0 %
KETONES UR STRIP-MCNC: NEGATIVE MG/DL
LEUKOCYTE ESTERASE UR QL STRIP: ABNORMAL
LYMPHOCYTES # BLD AUTO: 1.9 10E3/UL (ref 0.8–5.3)
LYMPHOCYTES NFR BLD AUTO: 20 %
MCH RBC QN AUTO: 30.5 PG (ref 26.5–33)
MCHC RBC AUTO-ENTMCNC: 35.4 G/DL (ref 31.5–36.5)
MCV RBC AUTO: 86 FL (ref 78–100)
MONOCYTES # BLD AUTO: 0.6 10E3/UL (ref 0–1.3)
MONOCYTES NFR BLD AUTO: 6 %
MUCOUS THREADS #/AREA URNS LPF: PRESENT /LPF
NEUTROPHILS # BLD AUTO: 6.6 10E3/UL (ref 1.6–8.3)
NEUTROPHILS NFR BLD AUTO: 72 %
NITRATE UR QL: NEGATIVE
NRBC # BLD AUTO: 0 10E3/UL
NRBC BLD AUTO-RTO: 0 /100
PH UR STRIP: 6 [PH] (ref 4.7–8)
PLATELET # BLD AUTO: 366 10E3/UL (ref 150–450)
POTASSIUM BLD-SCNC: 3.4 MMOL/L (ref 3.4–5.3)
PROT SERPL-MCNC: 7.7 G/DL (ref 6.8–8.8)
RBC # BLD AUTO: 4.59 10E6/UL (ref 3.8–5.2)
RBC URINE: 0 /HPF
SODIUM SERPL-SCNC: 136 MMOL/L (ref 133–144)
SP GR UR STRIP: 1.02 (ref 1–1.03)
SQUAMOUS EPITHELIAL: 10 /HPF
TSH SERPL DL<=0.005 MIU/L-ACNC: 0.66 MU/L (ref 0.4–4)
UROBILINOGEN UR STRIP-MCNC: NORMAL MG/DL
WBC # BLD AUTO: 9.2 10E3/UL (ref 4–11)
WBC URINE: 3 /HPF

## 2021-10-30 PROCEDURE — 86618 LYME DISEASE ANTIBODY: CPT | Performed by: NURSE PRACTITIONER

## 2021-10-30 PROCEDURE — 87015 SPECIMEN INFECT AGNT CONCNTJ: CPT | Performed by: NURSE PRACTITIONER

## 2021-10-30 PROCEDURE — 87086 URINE CULTURE/COLONY COUNT: CPT | Performed by: NURSE PRACTITIONER

## 2021-10-30 PROCEDURE — 36415 COLL VENOUS BLD VENIPUNCTURE: CPT | Performed by: NURSE PRACTITIONER

## 2021-10-30 PROCEDURE — 99284 EMERGENCY DEPT VISIT MOD MDM: CPT | Performed by: NURSE PRACTITIONER

## 2021-10-30 PROCEDURE — 80053 COMPREHEN METABOLIC PANEL: CPT | Performed by: NURSE PRACTITIONER

## 2021-10-30 PROCEDURE — 84443 ASSAY THYROID STIM HORMONE: CPT | Performed by: NURSE PRACTITIONER

## 2021-10-30 PROCEDURE — 99283 EMERGENCY DEPT VISIT LOW MDM: CPT

## 2021-10-30 PROCEDURE — 87798 DETECT AGENT NOS DNA AMP: CPT | Performed by: NURSE PRACTITIONER

## 2021-10-30 PROCEDURE — 81001 URINALYSIS AUTO W/SCOPE: CPT | Performed by: NURSE PRACTITIONER

## 2021-10-30 PROCEDURE — 85025 COMPLETE CBC W/AUTO DIFF WBC: CPT | Performed by: NURSE PRACTITIONER

## 2021-10-30 ASSESSMENT — ENCOUNTER SYMPTOMS
SHORTNESS OF BREATH: 0
FREQUENCY: 0
WEAKNESS: 0
DYSURIA: 0
SORE THROAT: 0
PALPITATIONS: 0
BACK PAIN: 0
BLOOD IN STOOL: 0
RHINORRHEA: 0
VOMITING: 0
NUMBNESS: 1
CONSTIPATION: 0
FEVER: 0
DIFFICULTY URINATING: 0
DIZZINESS: 1
ABDOMINAL PAIN: 1
MYALGIAS: 0
NAUSEA: 0
DIARRHEA: 0
COUGH: 0
HEADACHES: 1
LIGHT-HEADEDNESS: 1
HEMATURIA: 0
ARTHRALGIAS: 0
CHILLS: 0

## 2021-10-30 NOTE — ED NOTES
"Reports at 1315 she started having numbness and tingling of the right side of face, right arm and leg. Reports she \"wasn't able to move right arm or side of my face\". Reports this lasted about an hour and is no longer present at this time although does have headache now and abdominal cramping. Right hand grasp is weaker than left. Denies vision changes.   "

## 2021-10-30 NOTE — ED NOTES
Patient ambulated on discharge, carrying toddler, without difficulty. Encouraged to return if any concerning or new symptoms.
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
## 2021-10-31 LAB — BACTERIA UR CULT: NORMAL

## 2021-11-01 LAB
A PHAG+EHRL SP DNA PNL BLD NAA+NON-PROBE: NEGATIVE
A PHAGOCYTOPH DNA BLD QL NAA+PROBE: NEGATIVE
B BURGDOR IGG+IGM SER QL: 0.09
BABESIA SPEC: NEGATIVE

## 2021-11-09 ENCOUNTER — PRENATAL OFFICE VISIT (OUTPATIENT)
Dept: OBGYN | Facility: OTHER | Age: 21
End: 2021-11-09
Attending: OBSTETRICS & GYNECOLOGY
Payer: COMMERCIAL

## 2021-11-09 VITALS
WEIGHT: 152 LBS | SYSTOLIC BLOOD PRESSURE: 110 MMHG | DIASTOLIC BLOOD PRESSURE: 72 MMHG | BODY MASS INDEX: 27.97 KG/M2 | HEART RATE: 72 BPM | HEIGHT: 62 IN

## 2021-11-09 DIAGNOSIS — Z12.4 CERVICAL CANCER SCREENING: ICD-10-CM

## 2021-11-09 DIAGNOSIS — Z34.81 ENCOUNTER FOR SUPERVISION OF OTHER NORMAL PREGNANCY IN FIRST TRIMESTER: Primary | ICD-10-CM

## 2021-11-09 LAB
ABO/RH(D): NORMAL
ANTIBODY SCREEN: NEGATIVE
BASOPHILS # BLD AUTO: 0 10E3/UL (ref 0–0.2)
BASOPHILS NFR BLD AUTO: 0 %
C TRACH DNA SPEC QL PROBE+SIG AMP: NEGATIVE
EOSINOPHIL # BLD AUTO: 0.3 10E3/UL (ref 0–0.7)
EOSINOPHIL NFR BLD AUTO: 2 %
ERYTHROCYTE [DISTWIDTH] IN BLOOD BY AUTOMATED COUNT: 11.7 % (ref 10–15)
HCT VFR BLD AUTO: 38 % (ref 35–47)
HGB BLD-MCNC: 13.8 G/DL (ref 11.7–15.7)
IMM GRANULOCYTES # BLD: 0 10E3/UL
IMM GRANULOCYTES NFR BLD: 0 %
LYMPHOCYTES # BLD AUTO: 2.4 10E3/UL (ref 0.8–5.3)
LYMPHOCYTES NFR BLD AUTO: 19 %
MCH RBC QN AUTO: 30.9 PG (ref 26.5–33)
MCHC RBC AUTO-ENTMCNC: 36.3 G/DL (ref 31.5–36.5)
MCV RBC AUTO: 85 FL (ref 78–100)
MONOCYTES # BLD AUTO: 0.6 10E3/UL (ref 0–1.3)
MONOCYTES NFR BLD AUTO: 4 %
N GONORRHOEA DNA SPEC QL NAA+PROBE: NEGATIVE
NEUTROPHILS # BLD AUTO: 9.3 10E3/UL (ref 1.6–8.3)
NEUTROPHILS NFR BLD AUTO: 75 %
NRBC # BLD AUTO: 0 10E3/UL
NRBC BLD AUTO-RTO: 0 /100
PLATELET # BLD AUTO: 354 10E3/UL (ref 150–450)
RBC # BLD AUTO: 4.47 10E6/UL (ref 3.8–5.2)
SPECIMEN EXPIRATION DATE: NORMAL
WBC # BLD AUTO: 12.6 10E3/UL (ref 4–11)

## 2021-11-09 PROCEDURE — 99207 PR OB VISIT-NO CHARGE - GICH ONLY: CPT | Performed by: OBSTETRICS & GYNECOLOGY

## 2021-11-09 PROCEDURE — G0123 SCREEN CERV/VAG THIN LAYER: HCPCS | Performed by: OBSTETRICS & GYNECOLOGY

## 2021-11-09 PROCEDURE — 86780 TREPONEMA PALLIDUM: CPT | Mod: ZL | Performed by: OBSTETRICS & GYNECOLOGY

## 2021-11-09 PROCEDURE — 87389 HIV-1 AG W/HIV-1&-2 AB AG IA: CPT | Mod: ZL | Performed by: OBSTETRICS & GYNECOLOGY

## 2021-11-09 PROCEDURE — 36415 COLL VENOUS BLD VENIPUNCTURE: CPT | Mod: ZL | Performed by: OBSTETRICS & GYNECOLOGY

## 2021-11-09 PROCEDURE — 86762 RUBELLA ANTIBODY: CPT | Mod: ZL | Performed by: OBSTETRICS & GYNECOLOGY

## 2021-11-09 PROCEDURE — 86900 BLOOD TYPING SEROLOGIC ABO: CPT | Mod: ZL | Performed by: OBSTETRICS & GYNECOLOGY

## 2021-11-09 PROCEDURE — 85025 COMPLETE CBC W/AUTO DIFF WBC: CPT | Mod: ZL | Performed by: OBSTETRICS & GYNECOLOGY

## 2021-11-09 PROCEDURE — 87340 HEPATITIS B SURFACE AG IA: CPT | Mod: ZL | Performed by: OBSTETRICS & GYNECOLOGY

## 2021-11-09 PROCEDURE — 87086 URINE CULTURE/COLONY COUNT: CPT | Mod: ZL | Performed by: OBSTETRICS & GYNECOLOGY

## 2021-11-09 PROCEDURE — 87491 CHLMYD TRACH DNA AMP PROBE: CPT | Mod: ZL | Performed by: OBSTETRICS & GYNECOLOGY

## 2021-11-09 ASSESSMENT — PAIN SCALES - GENERAL: PAINLEVEL: NO PAIN (0)

## 2021-11-09 ASSESSMENT — MIFFLIN-ST. JEOR: SCORE: 1412.72

## 2021-11-09 NOTE — LETTER
November 18, 2021      Bebe Carl  [address] [address]        Dear MsChloeCarl,  I am happy to inform you that your recent cervical cancer screening test (PAP smear) was normal.      Preventative screenings such as this help to ensure your health for years to come. You should repeat a pap smear in 3 years, unless otherwise directed.      You will still need to return to the clinic every year for your annual exam and other preventive tests.     If you have additional questions regarding this result, please call our registered nurse at [phone].      Sincerely,    Mandie Shook MD

## 2021-11-09 NOTE — NURSING NOTE
Chief Complaint   Patient presents with     Prenatal Care     OBPX LMP: 9/4/21     Offers no complaints  Nancy Cortes LPN........................11/9/2021  3:32 PM   Medication Reconciliation: completed   Nancy Cortes LPN  11/9/2021 3:32 PM
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
## 2021-11-10 LAB
HBV SURFACE AG SERPL QL IA: NONREACTIVE
HIV 1+2 AB+HIV1 P24 AG SERPL QL IA: NONREACTIVE
T PALLIDUM AB SER QL: NONREACTIVE

## 2021-11-11 LAB
BKR LAB AP GYN ADEQUACY: NORMAL
BKR LAB AP GYN INTERPRETATION: NORMAL
BKR LAB AP GYN OTHER FINDINGS: NORMAL
BKR LAB AP HPV REFLEX: NO
BKR LAB AP LMP: NORMAL
BKR LAB AP PREVIOUS ABNORMAL: NORMAL
PATH REPORT.COMMENTS IMP SPEC: NORMAL
PATH REPORT.COMMENTS IMP SPEC: NORMAL
PATH REPORT.RELEVANT HX SPEC: NORMAL
RUBV IGG SERPL QL IA: 1 INDEX
RUBV IGG SERPL QL IA: POSITIVE

## 2021-11-12 LAB — BACTERIA UR CULT: NORMAL

## 2021-11-16 ENCOUNTER — TELEPHONE (OUTPATIENT)
Dept: OBGYN | Facility: OTHER | Age: 21
End: 2021-11-16
Payer: COMMERCIAL

## 2021-11-16 NOTE — TELEPHONE ENCOUNTER
Patient would only say the call was personal. Would not give a reason.    Shae Maciel on 11/16/2021 at 9:01 AM

## 2021-11-16 NOTE — TELEPHONE ENCOUNTER
Patient call returned, verification of name and . Patient inquires PAP results from 2021. Discussed that results are not available on this date, patient will be called once resulted and reviewed by Mandie Shook MD. Patient verbalized understanding and has no further questions at this time. Carmelina Fleming RN ....................  2021   11:31 AM

## 2021-11-18 ENCOUNTER — TELEPHONE (OUTPATIENT)
Dept: OBGYN | Facility: OTHER | Age: 21
End: 2021-11-18
Payer: COMMERCIAL

## 2021-11-18 NOTE — TELEPHONE ENCOUNTER
Call to patient and gave [facility] information. She has no further questions.     Laura Gonzalez RN on 11/18/2021 at 10:06 AM

## 2022-01-18 ENCOUNTER — PRENATAL OFFICE VISIT (OUTPATIENT)
Dept: OBGYN | Facility: OTHER | Age: 22
End: 2022-01-18
Attending: OBSTETRICS & GYNECOLOGY
Payer: COMMERCIAL

## 2022-01-18 VITALS
WEIGHT: 152.6 LBS | HEART RATE: 87 BPM | DIASTOLIC BLOOD PRESSURE: 64 MMHG | OXYGEN SATURATION: 98 % | BODY MASS INDEX: 27.91 KG/M2 | RESPIRATION RATE: 16 BRPM | SYSTOLIC BLOOD PRESSURE: 108 MMHG

## 2022-01-18 DIAGNOSIS — Z34.82 ENCOUNTER FOR SUPERVISION OF OTHER NORMAL PREGNANCY, SECOND TRIMESTER: Primary | ICD-10-CM

## 2022-01-18 PROCEDURE — 36415 COLL VENOUS BLD VENIPUNCTURE: CPT | Mod: ZL | Performed by: OBSTETRICS & GYNECOLOGY

## 2022-01-18 PROCEDURE — 99207 PR OB VISIT-NO CHARGE - GICH ONLY: CPT | Performed by: OBSTETRICS & GYNECOLOGY

## 2022-01-18 NOTE — PROGRESS NOTES
Return OB Visit    S: Patient is doing well. Went to get an  but stopped mid-procedure. She is not sure how far they got, not sure if her cervix was dilated at all. She is no longer with the FOB- he is not aware of the pregnancy either and she does not plan to tell him. No cramping or VB. Is feeling FM.    O: /64 (BP Location: Right arm, Patient Position: Sitting, Cuff Size: Adult Regular)   Pulse 87   Resp 16   Wt 69.2 kg (152 lb 9.6 oz)   LMP 2021 (Exact Date)   SpO2 98%   Breastfeeding No   BMI 27.91 kg/m    Gen: Well-appearing, NAD  See OB Flowsheet    A/P:  Bebe Carl is a 21 year old  at 19w3d by LMP, here for return OB visit.  Bipolar disorder: stable off meds  Postpartum hemorrhage secondary to vaginal laceration, required transfusion  SMA carrier: no longer with FOB, does not plan to have him tested  Dating: will redate based on ultrasound- had irregular menses, was on OCPs and took plan B    PNC: Rh positive, Rubella immune  Genetics: previously normal CF screening. Desires aneuploidy screening- drawn 2022   Imaging: dating and anatomy US ord'd- had an US at  clinic on  that measured 16w5d  Immunizations: declines flu, COVID  RTC 4 weeks, ultrasound MICHAELLE Shook MD FACOG  OB/GYN  2022 1:36 PM

## 2022-01-18 NOTE — PROGRESS NOTES
"Pt. Here for prenatal visit. States she has felt baby move, no leaking of fluids or vaginal bleeding. Last OB visit was in November.       FOOD SECURITY SCREENING QUESTIONS:    The next two questions are to help us understand your food security.  If you are feeling you need any assistance in this area, we have resources available to support you today.    Hunger Vital Signs:  Within the past 12 months we worried whether our food would run out before we got money to buy more. Never  Within the past 12 months the food we bought just didn't last and we didn't have money to get more. Never    Chief Complaint   Patient presents with     Prenatal Care     19w 3d       Initial /64 (BP Location: Right arm, Patient Position: Sitting, Cuff Size: Adult Regular)   Pulse 87   Resp 16   Wt 69.2 kg (152 lb 9.6 oz)   LMP 09/04/2021 (Exact Date)   SpO2 98%   Breastfeeding No   BMI 27.91 kg/m   Estimated body mass index is 27.91 kg/m  as calculated from the following:    Height as of 11/9/21: 1.575 m (5' 2\").    Weight as of this encounter: 69.2 kg (152 lb 9.6 oz).  Medication Reconciliation: complete    Germaine Shook RN    "

## 2022-01-24 ENCOUNTER — TELEPHONE (OUTPATIENT)
Dept: OBGYN | Facility: OTHER | Age: 22
End: 2022-01-24
Payer: COMMERCIAL

## 2022-01-24 NOTE — TELEPHONE ENCOUNTER
Reason for call: Request for results.    Name of test or procedure: Genetic    Date of test or procedure: 01/18/22    Location of test or procedure: [facility]    Preferred method for responding to this message: Telephone Call    Phone number patient can be reached at: Cell number on file:    Telephone Information:   Mobile [phone]       If we can't reach you directly, may we leave a detailed response at the number you provided?Yes

## 2022-01-25 ENCOUNTER — TELEPHONE (OUTPATIENT)
Dept: OBGYN | Facility: OTHER | Age: 22
End: 2022-01-25
Payer: COMMERCIAL

## 2022-01-25 DIAGNOSIS — F32.A DEPRESSION AFFECTING PREGNANCY IN SECOND TRIMESTER, ANTEPARTUM: Primary | ICD-10-CM

## 2022-01-25 DIAGNOSIS — O99.342 DEPRESSION AFFECTING PREGNANCY IN SECOND TRIMESTER, ANTEPARTUM: Primary | ICD-10-CM

## 2022-01-25 NOTE — TELEPHONE ENCOUNTER
SARITAJ-patient would like a call back she did not state a reason other than she has a question    Please call and advise    Thank You    Ashley Ghosh on 1/25/2022 at 8:26 AM

## 2022-01-25 NOTE — TELEPHONE ENCOUNTER
Patient reports having a lot of anxiety with this pregnancy. Cannot sleep at night, anxiety carries throughout day. Had previously been on Sertraline prior to pregnancy due to depression/anxiety, 25 mg daily. Patient states she feels that both anxiety and depression are worsening now that she has been off of her medication for awhile and desires prescription recommendation from OB/Gyn for concerns prior to next appointment 02/15/2022. Feels safe at home, no thoughts of harming self. [facility] preference. No further questions or concerns at this time.   Carmelina Fleming RN ....................  1/25/2022   8:41 AM

## 2022-01-26 RX ORDER — SERTRALINE HYDROCHLORIDE 25 MG/1
25 TABLET, FILM COATED ORAL DAILY
Qty: 30 TABLET | Refills: 1 | Status: SHIPPED | OUTPATIENT
Start: 2022-01-26 | End: 2022-06-29

## 2022-01-27 NOTE — TELEPHONE ENCOUNTER
Call placed to patient and updated that Rx for Zoloft was sent to [facility] for her. No further questions or concerns at this time.   Germaine Shook RN on 1/27/2022 at 8:53 AM

## 2022-01-28 ENCOUNTER — HOSPITAL ENCOUNTER (OUTPATIENT)
Dept: ULTRASOUND IMAGING | Facility: OTHER | Age: 22
Discharge: HOME | End: 2022-01-28
Attending: OBSTETRICS & GYNECOLOGY | Admitting: OBSTETRICS & GYNECOLOGY
Payer: COMMERCIAL

## 2022-01-28 DIAGNOSIS — Z34.82 ENCOUNTER FOR SUPERVISION OF OTHER NORMAL PREGNANCY, SECOND TRIMESTER: ICD-10-CM

## 2022-01-28 PROCEDURE — 76805 OB US >/= 14 WKS SNGL FETUS: CPT

## 2022-02-03 LAB — SCANNED LAB RESULT: NORMAL

## 2022-02-09 DIAGNOSIS — Z34.82 ENCOUNTER FOR SUPERVISION OF OTHER NORMAL PREGNANCY, SECOND TRIMESTER: Primary | ICD-10-CM

## 2022-02-15 ENCOUNTER — PRENATAL OFFICE VISIT (OUTPATIENT)
Dept: OBGYN | Facility: OTHER | Age: 22
End: 2022-02-15
Attending: OBSTETRICS & GYNECOLOGY
Payer: COMMERCIAL

## 2022-02-15 VITALS
DIASTOLIC BLOOD PRESSURE: 72 MMHG | SYSTOLIC BLOOD PRESSURE: 118 MMHG | BODY MASS INDEX: 27.62 KG/M2 | HEART RATE: 76 BPM | WEIGHT: 151 LBS

## 2022-02-15 DIAGNOSIS — Z34.82 ENCOUNTER FOR SUPERVISION OF OTHER NORMAL PREGNANCY, SECOND TRIMESTER: Primary | ICD-10-CM

## 2022-02-15 PROCEDURE — 99207 PR OB VISIT-NO CHARGE - GICH ONLY: CPT | Performed by: OBSTETRICS & GYNECOLOGY

## 2022-02-15 ASSESSMENT — PAIN SCALES - GENERAL: PAINLEVEL: NO PAIN (0)

## 2022-02-15 NOTE — PROGRESS NOTES
Return OB Visit    S: Patient is doing better on zoloft. Her daughter had COVID and they were all in quarantine together. She did not test but also did not have symptoms. Being in quarantined triggered her anxiety.  No cramping or VB.     O: /72 (BP Location: Right arm, Patient Position: Sitting, Cuff Size: Adult Regular)   Pulse 76   Wt 68.5 kg (151 lb)   LMP 2021 (Exact Date)   Breastfeeding No   BMI 27.62 kg/m    Gen: Well-appearing, NAD  See OB Flowsheet    A/P:  Bebe Carl is a 21 year old  at 23w3d by 20w2d  US, here for return OB visit.  Bipolar disorder: on zoloft, improved  Postpartum hemorrhage secondary to vaginal laceration, required transfusion  SMA carrier: no longer with FOB, does not plan to have him tested  Dating: will redate based on ultrasound- had irregular menses, was on OCPs and took plan B     PNC: Rh positive, Rubella immune  Genetics: previously normal CF screening. Normal aneuploidy screen  Imaging: dating and anatomy US at 20w2d, normal except HC 3%ile, normal BPD. Repeat scheduled   Immunizations: declines flu, COVID  RTC 4 weeks- GCT, CBC, syphilis, Tdap next visit    Mandie Shook MD FACOG  OB/GYN  2/15/2022 3:29 PM

## 2022-02-15 NOTE — NURSING NOTE
Chief Complaint   Patient presents with     Prenatal Care     23w3d       Medication Reconciliation: complete   Offers no complaints    Nancy Cortes LPN........................2/15/2022  3:46 PM

## 2022-02-28 ENCOUNTER — HOSPITAL ENCOUNTER (OUTPATIENT)
Dept: ULTRASOUND IMAGING | Facility: OTHER | Age: 22
Discharge: HOME | End: 2022-02-28
Attending: OBSTETRICS & GYNECOLOGY | Admitting: OBSTETRICS & GYNECOLOGY
Payer: COMMERCIAL

## 2022-02-28 DIAGNOSIS — Z34.82 ENCOUNTER FOR SUPERVISION OF OTHER NORMAL PREGNANCY, SECOND TRIMESTER: ICD-10-CM

## 2022-02-28 PROCEDURE — 76816 OB US FOLLOW-UP PER FETUS: CPT

## 2022-03-07 ENCOUNTER — TELEPHONE (OUTPATIENT)
Dept: OBGYN | Facility: OTHER | Age: 22
End: 2022-03-07
Payer: COMMERCIAL

## 2022-03-07 NOTE — TELEPHONE ENCOUNTER
Per patient she drinks theraflu tea usually and is wondering if this is safe for baby. Discussed that phenylephrine should not be taken in pregnancy. Pt voices understanding and is wondering if this is safe in breastfeeding. Discussed asking Dr. RAHMAN about this next visit.     Laura Gonzalez RN on 3/7/2022 at 3:15 PM

## 2022-03-15 ENCOUNTER — PRENATAL OFFICE VISIT (OUTPATIENT)
Dept: OBGYN | Facility: OTHER | Age: 22
End: 2022-03-15
Attending: OBSTETRICS & GYNECOLOGY
Payer: COMMERCIAL

## 2022-03-15 VITALS
DIASTOLIC BLOOD PRESSURE: 70 MMHG | BODY MASS INDEX: 28.53 KG/M2 | WEIGHT: 156 LBS | SYSTOLIC BLOOD PRESSURE: 118 MMHG | HEART RATE: 100 BPM

## 2022-03-15 DIAGNOSIS — Z34.82 ENCOUNTER FOR SUPERVISION OF OTHER NORMAL PREGNANCY, SECOND TRIMESTER: Primary | ICD-10-CM

## 2022-03-15 LAB
ERYTHROCYTE [DISTWIDTH] IN BLOOD BY AUTOMATED COUNT: 12.2 % (ref 10–15)
GLUCOSE 1H P 50 G GLC PO SERPL-MCNC: 84 MG/DL (ref 70–129)
HCT VFR BLD AUTO: 29.8 % (ref 35–47)
HGB BLD-MCNC: 10.4 G/DL (ref 11.7–15.7)
MCH RBC QN AUTO: 31.1 PG (ref 26.5–33)
MCHC RBC AUTO-ENTMCNC: 34.9 G/DL (ref 31.5–36.5)
MCV RBC AUTO: 89 FL (ref 78–100)
PLATELET # BLD AUTO: 300 10E3/UL (ref 150–450)
RBC # BLD AUTO: 3.34 10E6/UL (ref 3.8–5.2)
WBC # BLD AUTO: 12.1 10E3/UL (ref 4–11)

## 2022-03-15 PROCEDURE — 90715 TDAP VACCINE 7 YRS/> IM: CPT

## 2022-03-15 PROCEDURE — 36415 COLL VENOUS BLD VENIPUNCTURE: CPT | Mod: ZL | Performed by: OBSTETRICS & GYNECOLOGY

## 2022-03-15 PROCEDURE — 99207 PR OB VISIT-NO CHARGE - GICH ONLY: CPT | Performed by: OBSTETRICS & GYNECOLOGY

## 2022-03-15 PROCEDURE — 85027 COMPLETE CBC AUTOMATED: CPT | Mod: ZL | Performed by: OBSTETRICS & GYNECOLOGY

## 2022-03-15 PROCEDURE — 90471 IMMUNIZATION ADMIN: CPT

## 2022-03-15 PROCEDURE — 86780 TREPONEMA PALLIDUM: CPT | Mod: ZL | Performed by: OBSTETRICS & GYNECOLOGY

## 2022-03-15 PROCEDURE — 82950 GLUCOSE TEST: CPT | Mod: ZL | Performed by: OBSTETRICS & GYNECOLOGY

## 2022-03-15 ASSESSMENT — PAIN SCALES - GENERAL: PAINLEVEL: NO PAIN (0)

## 2022-03-15 NOTE — NURSING NOTE
Chief Complaint   Patient presents with     Prenatal Care     27w3d       Medication Reconciliation: complete   Offers no complaints, baby is nice and active.     Nancy Cortes LPN........................3/15/2022  1:47 PM

## 2022-03-15 NOTE — PROGRESS NOTES
Return OB Visit    S: Patient is feeling well, tired. No ctx, VB or LOF. +FM    O: /70 (BP Location: Right arm, Patient Position: Sitting, Cuff Size: Adult Regular)   Pulse 100   Wt 70.8 kg (156 lb)   LMP 2021 (Exact Date)   Breastfeeding No   BMI 28.53 kg/m    Gen: Well-appearing, NAD  See OB Flowsheet    A/P:  Bebe Carl is a 21 year old  at 27w3d by 20w2d  US, here for return OB visit.  Bipolar disorder: on zoloft, improved  Postpartum hemorrhage secondary to vaginal laceration, required transfusion  SMA carrier: no longer with FOB, does not plan to have him tested  Dating: will redate based on ultrasound- had irregular menses, was on OCPs and took plan B     PNC: Rh positive, Rubella immune  Genetics: previously normal CF screening. Normal aneuploidy screen  Imaging: dating and anatomy US at 20w2d, normal except HC 3%ile, normal BPD. Repeat scheduled   Immunizations: declines flu, COVID  RTC 4 weeks    Mandie Shook MD FACOG  OB/GYN  3/15/2022 1:55 PM

## 2022-03-17 LAB — T PALLIDUM AB SER QL: NONREACTIVE

## 2022-03-20 ENCOUNTER — HOSPITAL ENCOUNTER (OUTPATIENT)
Facility: OTHER | Age: 22
Discharge: HOME | End: 2022-03-20
Attending: OBSTETRICS & GYNECOLOGY | Admitting: OBSTETRICS & GYNECOLOGY
Payer: COMMERCIAL

## 2022-03-20 VITALS
SYSTOLIC BLOOD PRESSURE: 114 MMHG | HEART RATE: 93 BPM | BODY MASS INDEX: 29.83 KG/M2 | TEMPERATURE: 97 F | DIASTOLIC BLOOD PRESSURE: 66 MMHG | HEIGHT: 61 IN | RESPIRATION RATE: 20 BRPM | WEIGHT: 158 LBS

## 2022-03-20 LAB
A1 MICROGLOB PLACENTAL VAG QL: NEGATIVE
ALBUMIN UR-MCNC: 10 MG/DL
AMORPH CRY #/AREA URNS HPF: ABNORMAL /HPF
APPEARANCE UR: ABNORMAL
BILIRUB UR QL STRIP: NEGATIVE
CLUE CELLS: NORMAL
COLOR UR AUTO: ABNORMAL
GLUCOSE UR STRIP-MCNC: NEGATIVE MG/DL
HGB UR QL STRIP: NEGATIVE
KETONES UR STRIP-MCNC: NEGATIVE MG/DL
LEUKOCYTE ESTERASE UR QL STRIP: ABNORMAL
MUCOUS THREADS #/AREA URNS LPF: PRESENT /LPF
NITRATE UR QL: NEGATIVE
PH UR STRIP: 6.5 [PH] (ref 5–9)
RBC URINE: 1 /HPF
SP GR UR STRIP: 1.02 (ref 1–1.03)
SQUAMOUS EPITHELIAL: 5 /HPF
TRICHOMONAS, WET PREP: NORMAL
UROBILINOGEN UR STRIP-MCNC: NORMAL MG/DL
WBC URINE: 7 /HPF
WBC'S/HIGH POWER FIELD, WET PREP: NORMAL
YEAST, WET PREP: NORMAL

## 2022-03-20 PROCEDURE — G0463 HOSPITAL OUTPT CLINIC VISIT: HCPCS

## 2022-03-20 PROCEDURE — 81001 URINALYSIS AUTO W/SCOPE: CPT | Performed by: OBSTETRICS & GYNECOLOGY

## 2022-03-20 PROCEDURE — 87210 SMEAR WET MOUNT SALINE/INK: CPT | Performed by: OBSTETRICS & GYNECOLOGY

## 2022-03-20 PROCEDURE — 84112 EVAL AMNIOTIC FLUID PROTEIN: CPT | Performed by: OBSTETRICS & GYNECOLOGY

## 2022-03-20 RX ORDER — LIDOCAINE 40 MG/G
CREAM TOPICAL
Status: DISCONTINUED | OUTPATIENT
Start: 2022-03-20 | End: 2022-03-21 | Stop reason: HOSPADM

## 2022-03-20 ASSESSMENT — ACTIVITIES OF DAILY LIVING (ADL)
WEAR_GLASSES_OR_BLIND: YES
DOING_ERRANDS_INDEPENDENTLY_DIFFICULTY: NO
CONCENTRATING,_REMEMBERING_OR_MAKING_DECISIONS_DIFFICULTY: NO
DIFFICULTY_EATING/SWALLOWING: NO
WALKING_OR_CLIMBING_STAIRS_DIFFICULTY: NO
TOILETING_ISSUES: NO
DRESSING/BATHING_DIFFICULTY: NO
FALL_HISTORY_WITHIN_LAST_SIX_MONTHS: NO
VISION_MANAGEMENT: GLASSES
CHANGE_IN_FUNCTIONAL_STATUS_SINCE_ONSET_OF_CURRENT_ILLNESS/INJURY: NO

## 2022-03-21 ENCOUNTER — HOSPITAL ENCOUNTER (OUTPATIENT)
Facility: OTHER | Age: 22
End: 2022-03-21
Admitting: OBSTETRICS & GYNECOLOGY
Payer: COMMERCIAL

## 2022-03-21 ENCOUNTER — HOSPITAL ENCOUNTER (OUTPATIENT)
Facility: OTHER | Age: 22
Discharge: HOME | End: 2022-03-21
Attending: OBSTETRICS & GYNECOLOGY | Admitting: OBSTETRICS & GYNECOLOGY
Payer: COMMERCIAL

## 2022-03-21 PROCEDURE — 99214 OFFICE O/P EST MOD 30 MIN: CPT | Performed by: OBSTETRICS & GYNECOLOGY

## 2022-03-21 PROCEDURE — G0463 HOSPITAL OUTPT CLINIC VISIT: HCPCS

## 2022-03-21 RX ORDER — LIDOCAINE 40 MG/G
CREAM TOPICAL
Status: DISCONTINUED | OUTPATIENT
Start: 2022-03-21 | End: 2022-03-21 | Stop reason: HOSPADM

## 2022-03-21 NOTE — PROGRESS NOTES
Amnisure negative. MD notified.  Notified MD of pink tinged and clear liquid discharge noted.  New order for Wet Prep.  Collected and awaiting lab results.

## 2022-03-21 NOTE — PROGRESS NOTES
Patient discharged to home at 0632.  Patient voiced understanding of all follow up appointments and when to return to clinic or with concern.

## 2022-03-21 NOTE — PROGRESS NOTES
Obstetrics Triage Note    HPI:  Bebe Carl is a 21 year old  female at 28w2d by 20w2d US here with complaints of vaginal bleeding.      Patient was in last night with leaking fluid, had negative evaluation. This morning, she noticed bright red bleeding when she went to the bathroom. No cramping or contractions. No abdominal trauma. No recent intercourse or masturbation. + FM.      ROS:  Negative except as mentioned in HPI.    PMH:  Past Medical History:   Diagnosis Date     Acute gastroenteritis 2019     ADHD (attention deficit hyperactivity disorder)      Anemia     following delivery      Bipolar 1 disorder (H)      Depression      Depressive disorder      History of blood transfusion      Postpartum depression      Viral gastroenteritis 2019       PSHx:  Past Surgical History:   Procedure Laterality Date     APPENDECTOMY         Medications:  No current facility-administered medications on file prior to encounter.  Prenatal Vit-Fe Fumarate-FA (PRENATAL MULTIVITAMIN W/IRON) 27-0.8 MG tablet, Take 1 tablet by mouth daily  sertraline (ZOLOFT) 25 MG tablet, Take 1 tablet (25 mg) by mouth daily         Allergies:     Allergies   Allergen Reactions     Sulfa Drugs Hives and Anaphylaxis     Atomoxetine GI Disturbance     Weight loss     Latex Rash     bandaides only       Physical Exam:   There were no vitals filed for this visit.   Gen: resting comfortably, in NAD  Resp: nonlabored  Abd: soft, gravid, non-tender, non-distended  Pelvic: normal external genitalia. Normal vagina, no erythema. Scant old blood in vaginal vault. No vaginal lacerations. No active bleeding from os. closed, long, high    NST:  FHT: , mod gustabo, no accels, no decels  [place]: 0 in 10 min    Labs/Imaging:  Results for orders placed or performed during the hospital encounter of 22 (from the past 24 hour(s))   UA reflex to Microscopic and Culture    Specimen: Urine, Midstream   Result Value Ref Range    Color Urine  Light Yellow Colorless, Straw, Light Yellow, Yellow    Appearance Urine Slightly Cloudy (A) Clear    Glucose Urine Negative Negative mg/dL    Bilirubin Urine Negative Negative    Ketones Urine Negative Negative mg/dL    Specific Gravity Urine 1.025 1.000 - 1.030    Blood Urine Negative Negative    pH Urine 6.5 5.0 - 9.0    Protein Albumin Urine 10  (A) Negative mg/dL    Urobilinogen Urine Normal Normal, 2.0 mg/dL    Nitrite Urine Negative Negative    Leukocyte Esterase Urine Small (A) Negative    Mucus Urine Present (A) None Seen /LPF    Amorphous Crystals Urine Moderate (A) None Seen /HPF    RBC Urine 1 <=2 /HPF    WBC Urine 7 (H) <=5 /HPF    Squamous Epithelials Urine 5 (H) <=1 /HPF    Narrative    Urine Culture not indicated   Rupture of membranes by Amnisure   Result Value Ref Range    Amnisure Negative Negative   Wet prep    Specimen: Vagina; Swab   Result Value Ref Range    Trichomonas Absent Absent    Yeast Absent Absent    Clue Cells Absent Absent    WBCs/high power field None None       Assessment/Plan: Bebe Carl is a 21 year old  at 28w2d by 20w2d US, here for vaginal bleeding. Had negative infectious evaluation last night. Amnisure  Negative. No ongoing bleeding, cervix closed, no ctx on monitoring and Cat I FHT. Patient reassured. Okay to discharge.  - Dispo: to home with follow up in the next week. Discussed tylenol for pain as needed. Discussed labor warning signs and indication to return to care.    Mandie Shook MD  OBGYN  3/21/2022 6:17 AM

## 2022-03-21 NOTE — PROGRESS NOTES
MD evaluated patient with cervical exam.  SVE closed.  No active bleeding. OK to discharge to home.

## 2022-03-21 NOTE — PROGRESS NOTES
Patient arrived from home complaining of leaking of fluid since 2115.  Placed on EFM. Baby is active.  Not roberth. FHR in the 140s with accelerations appropriate for gestational age. Patient denies pain.   Amnisure collected.

## 2022-03-21 NOTE — PROGRESS NOTES
Patient arrived from home stating that she woke up at 0500 bleeding vaginally bright red blood that filled the toilet.  She denies having any pain.  She denies contractions. Abdomen is soft to palpation. Baby is active.  FHR  130-140s.   Dark blood noted in vaginal area with a pea size clot at the opening.  Patient states her vagina is sore, tender to the touch.   MD to come evaluate patient.

## 2022-03-21 NOTE — PROGRESS NOTES
Patient discharged to home at 2300.  Patient voiced understanding of when to be concerned and her next follow up appointment.

## 2022-03-21 NOTE — DISCHARGE INSTRUCTIONS

## 2022-04-13 ENCOUNTER — PRENATAL OFFICE VISIT (OUTPATIENT)
Dept: OBGYN | Facility: OTHER | Age: 22
End: 2022-04-13
Attending: OBSTETRICS & GYNECOLOGY
Payer: COMMERCIAL

## 2022-04-13 VITALS
WEIGHT: 162 LBS | SYSTOLIC BLOOD PRESSURE: 128 MMHG | BODY MASS INDEX: 30.61 KG/M2 | HEART RATE: 72 BPM | DIASTOLIC BLOOD PRESSURE: 74 MMHG

## 2022-04-13 DIAGNOSIS — Z34.83 ENCOUNTER FOR SUPERVISION OF OTHER NORMAL PREGNANCY, THIRD TRIMESTER: Primary | ICD-10-CM

## 2022-04-13 PROCEDURE — 99207 PR OB VISIT-NO CHARGE - GICH ONLY: CPT | Performed by: OBSTETRICS & GYNECOLOGY

## 2022-04-13 ASSESSMENT — PAIN SCALES - GENERAL: PAINLEVEL: NO PAIN (0)

## 2022-04-13 NOTE — NURSING NOTE
"Chief Complaint   Patient presents with     Prenatal Care     31 weeks 4 days     Patient presents to clinic for routine check. Patient denies contractions, no leaking of fluids and baby is moving well.   Initial /74   Pulse 72   Wt 73.5 kg (162 lb)   LMP 09/04/2021 (Exact Date)   BMI 30.61 kg/m   Estimated body mass index is 30.61 kg/m  as calculated from the following:    Height as of 3/20/22: 1.549 m (5' 1\").    Weight as of this encounter: 73.5 kg (162 lb).  Medication Reconciliation: complete          Lizabeth Engel LPN  "

## 2022-04-13 NOTE — PROGRESS NOTES
Return OB Visit    S: Patient is doing well. Was seen on [facility] 3/21 for LOF, VB and cramping. All has resolved. Normal FM    O: /74   Pulse 72   Wt 73.5 kg (162 lb)   LMP 2021 (Exact Date)   BMI 30.61 kg/m    Gen: Well-appearing, NAD  See OB Flowsheet    A/P:  Bebe Carl is a 21 year old  at 31w4d by 20w2d  US, here for return OB visit.  Bipolar disorder: on zoloft, improved  Postpartum hemorrhage secondary to vaginal laceration, required transfusion  SMA carrier: no longer with FOB, does not plan to have him tested  Dating: will redate based on ultrasound- had irregular menses, was on OCPs and took plan B  Anemia: on iron  Plans breastfeeding, epidural  Desires sterilization: discussed risk of regret, especially given her young age. She is certain she has completed childbearing. FTP signed 3/15/22     PNC: Rh positive, Rubella immune, GCT 84  Genetics: previously normal CF screening. Normal aneuploidy screen  Imaging: dating and anatomy US at 20w2d, normal except HC 3%ile, normal BPD. Repeat scheduled   Immunizations: declines flu, COVID  RTC 2 weeks    Mandie Shook MD FACOG  OB/GYN  2022 2:03 PM

## 2022-05-03 ENCOUNTER — HOSPITAL ENCOUNTER (EMERGENCY)
Facility: OTHER | Age: 22
Discharge: HOME | End: 2022-05-03
Attending: FAMILY MEDICINE | Admitting: FAMILY MEDICINE
Payer: COMMERCIAL

## 2022-05-03 VITALS
TEMPERATURE: 97.8 F | OXYGEN SATURATION: 97 % | HEART RATE: 117 BPM | WEIGHT: 167 LBS | DIASTOLIC BLOOD PRESSURE: 53 MMHG | BODY MASS INDEX: 31.55 KG/M2 | SYSTOLIC BLOOD PRESSURE: 109 MMHG | RESPIRATION RATE: 16 BRPM

## 2022-05-03 DIAGNOSIS — R55 NEAR SYNCOPE: ICD-10-CM

## 2022-05-03 DIAGNOSIS — O99.013 ANEMIA DURING PREGNANCY IN THIRD TRIMESTER: ICD-10-CM

## 2022-05-03 LAB
ANION GAP SERPL CALCULATED.3IONS-SCNC: 11 MMOL/L (ref 3–14)
BASOPHILS # BLD AUTO: 0 10E3/UL (ref 0–0.2)
BASOPHILS NFR BLD AUTO: 0 %
BUN SERPL-MCNC: 4 MG/DL (ref 7–25)
CALCIUM SERPL-MCNC: 8.4 MG/DL (ref 8.6–10.3)
CHLORIDE BLD-SCNC: 102 MMOL/L (ref 98–107)
CO2 SERPL-SCNC: 22 MMOL/L (ref 21–31)
CREAT SERPL-MCNC: 0.53 MG/DL (ref 0.6–1.2)
EOSINOPHIL # BLD AUTO: 0.2 10E3/UL (ref 0–0.7)
EOSINOPHIL NFR BLD AUTO: 1 %
ERYTHROCYTE [DISTWIDTH] IN BLOOD BY AUTOMATED COUNT: 13 % (ref 10–15)
GFR SERPL CREATININE-BSD FRML MDRD: >90 ML/MIN/1.73M2
GLUCOSE BLD-MCNC: 89 MG/DL (ref 70–105)
HCT VFR BLD AUTO: 28.2 % (ref 35–47)
HGB BLD-MCNC: 9.5 G/DL (ref 11.7–15.7)
IMM GRANULOCYTES # BLD: 0.2 10E3/UL
IMM GRANULOCYTES NFR BLD: 1 %
LYMPHOCYTES # BLD AUTO: 1 10E3/UL (ref 0.8–5.3)
LYMPHOCYTES NFR BLD AUTO: 6 %
MCH RBC QN AUTO: 28.4 PG (ref 26.5–33)
MCHC RBC AUTO-ENTMCNC: 33.7 G/DL (ref 31.5–36.5)
MCV RBC AUTO: 84 FL (ref 78–100)
MONOCYTES # BLD AUTO: 0.9 10E3/UL (ref 0–1.3)
MONOCYTES NFR BLD AUTO: 5 %
NEUTROPHILS # BLD AUTO: 14.8 10E3/UL (ref 1.6–8.3)
NEUTROPHILS NFR BLD AUTO: 87 %
NRBC # BLD AUTO: 0 10E3/UL
NRBC BLD AUTO-RTO: 0 /100
PLATELET # BLD AUTO: 262 10E3/UL (ref 150–450)
POTASSIUM BLD-SCNC: 3.6 MMOL/L (ref 3.5–5.1)
RBC # BLD AUTO: 3.34 10E6/UL (ref 3.8–5.2)
SODIUM SERPL-SCNC: 135 MMOL/L (ref 134–144)
WBC # BLD AUTO: 17 10E3/UL (ref 4–11)

## 2022-05-03 PROCEDURE — 80048 BASIC METABOLIC PNL TOTAL CA: CPT | Performed by: FAMILY MEDICINE

## 2022-05-03 PROCEDURE — 36415 COLL VENOUS BLD VENIPUNCTURE: CPT | Performed by: FAMILY MEDICINE

## 2022-05-03 PROCEDURE — 96360 HYDRATION IV INFUSION INIT: CPT | Performed by: FAMILY MEDICINE

## 2022-05-03 PROCEDURE — 85004 AUTOMATED DIFF WBC COUNT: CPT | Performed by: FAMILY MEDICINE

## 2022-05-03 PROCEDURE — 99283 EMERGENCY DEPT VISIT LOW MDM: CPT | Performed by: FAMILY MEDICINE

## 2022-05-03 PROCEDURE — 258N000003 HC RX IP 258 OP 636: Performed by: FAMILY MEDICINE

## 2022-05-03 PROCEDURE — 99283 EMERGENCY DEPT VISIT LOW MDM: CPT | Mod: 25 | Performed by: FAMILY MEDICINE

## 2022-05-03 RX ORDER — SODIUM CHLORIDE 9 MG/ML
INJECTION, SOLUTION INTRAVENOUS CONTINUOUS
Status: DISCONTINUED | OUTPATIENT
Start: 2022-05-03 | End: 2022-05-03 | Stop reason: HOSPADM

## 2022-05-03 RX ADMIN — SODIUM CHLORIDE 1000 ML: 900 INJECTION, SOLUTION INTRAVENOUS at 17:06

## 2022-05-03 NOTE — ED TRIAGE NOTES
ED Nursing Triage Note (General)   ________________________________    Bebe Carl is a 21 year old Female that presents to triage private car  With history of being at [facility] with her daughter to be seen and was standing there and started getting light headed and felt like she was going to pass out. Pt denies contractions, states that fetal movement has been appropriate, no issues with pregnancy. PT states she had this happen with her last pregnancy and it ended up being low iron, pt is on iron supplements now.   /53   Pulse 117   Temp 97.8  F (36.6  C)   Resp 16   Wt 75.8 kg (167 lb)   LMP 09/04/2021 (Exact Date)   SpO2 97%   BMI 31.55 kg/m  t  Patient appears alert , in mild distress., and cooperative behavior.    GCS Total = 15  Airway: intact  Breathing noted as Normal  Circulation Normal  Skin:  Normal      PRE HOSPITAL PRIOR LIVING SITUATION Alone

## 2022-05-03 NOTE — ED PROVIDER NOTES
History     Chief Complaint   Patient presents with     Dizziness     HPI  Bebe Carl is a 21 year old female  who is a  at approximately 33 weeks gestation, Rh+, presents emergency department after a near passing out spell while in the rapid clinic.  She is in the rapid clinic with her daughter having her daughter evaluated when she started getting lightheaded and felt like she was going to pass out.  No abdominal pain contractions vaginal bleeding or unusual vaginal discharge.  No back pain, no headache.  Uncomplicated pregnancy thus far.  She has had significant anemia with her last pregnancy and occasionally would feel like this with her last pregnancy.  No nausea vomiting diarrhea, elevated blood pressure lower extremity swelling worse than normal, rectal bleeding or vision changes.    Reviewed nurses notes below, similar history is related to me.  Bebe Carl is a 21 year old Female that presents to triage private car  With history of being at [facility] with her daughter to be seen and was standing there and started getting light headed and felt like she was going to pass out. Pt denies contractions, states that fetal movement has been appropriate, no issues with pregnancy. PT states she had this happen with her last pregnancy and it ended up being low iron, pt is on iron supplements now.   Allergies:  Allergies   Allergen Reactions     Sulfa Drugs Hives and Anaphylaxis     Atomoxetine GI Disturbance     Weight loss     Latex Rash     bandaides only       Problem List:    Patient Active Problem List    Diagnosis Date Noted     Normal labor and delivery 2020     Priority: Medium      (normal spontaneous vaginal delivery) 2020     Priority: Medium     Acute gastroenteritis 2019     Priority: Medium     Dehydration 2019     Priority: Medium      contractions 2019     Priority: Medium     Encounter for triage in pregnant patient 10/01/2019     Priority:  Medium     ADHD 02/13/2018     Priority: Medium     Appendicitis with abscess 01/01/2017     Priority: Medium     Mononucleosis 01/01/2017     Priority: Medium     Ruptured suppurative appendicitis 01/01/2017     Priority: Medium     Controlled substance agreement signed 03/24/2016     Priority: Medium        Past Medical History:    Past Medical History:   Diagnosis Date     Acute gastroenteritis 12/24/2019     ADHD (attention deficit hyperactivity disorder)      Anemia      Bipolar 1 disorder (H)      Depression      Depressive disorder      History of blood transfusion      Postpartum depression      Viral gastroenteritis 12/24/2019       Past Surgical History:    Past Surgical History:   Procedure Laterality Date     APPENDECTOMY         Family History:    Family History   Problem Relation Age of Onset     Bipolar Disorder Mother      Attention Deficit Disorder Father      Thyroid Disease Father      Hyperthyroidism Sister      No Known Problems Brother      Brain Tumor Maternal Grandmother      Cancer Maternal Grandfather      Heart Disease Paternal Grandfather      Cancer Paternal Grandfather        Social History:  Marital Status:  Single [1]  Social History     Tobacco Use     Smoking status: Former Smoker     Packs/day: 1.00     Smokeless tobacco: Never Used   Vaping Use     Vaping Use: Former   Substance Use Topics     Alcohol use: No     Drug use: No        Medications:    ferrous sulfate (SLO-FE) 142 (45 Fe) MG CR tablet  Prenatal Vit-Fe Fumarate-FA (PRENATAL MULTIVITAMIN W/IRON) 27-0.8 MG tablet  sertraline (ZOLOFT) 25 MG tablet          Review of Systems   Constitutional: Negative for chills and fever.   HENT: Negative for congestion.    Respiratory: Negative for chest tightness.    Cardiovascular: Negative for chest pain.   Gastrointestinal: Negative for blood in stool.   Genitourinary: Negative for dysuria.   Musculoskeletal: Negative for back pain.   Neurological: Negative for headaches.        Physical Exam   BP: 109/53  Pulse: 117  Temp: 97.8  F (36.6  C)  Resp: 16  Weight: 75.8 kg (167 lb)  SpO2: 97 %      Physical Exam  Vitals and nursing note reviewed.   Constitutional:       General: She is not in acute distress.     Appearance: She is not diaphoretic.   HENT:      Head: Atraumatic.      Mouth/Throat:      Pharynx: No oropharyngeal exudate.   Eyes:      General: No scleral icterus.     Pupils: Pupils are equal, round, and reactive to light.   Cardiovascular:      Heart sounds: Normal heart sounds.   Pulmonary:      Effort: No respiratory distress.      Breath sounds: Normal breath sounds.   Abdominal:      General: Bowel sounds are normal.      Palpations: Abdomen is soft.      Tenderness: There is no abdominal tenderness.   Musculoskeletal:         General: No tenderness.   Skin:     General: Skin is warm.      Findings: No rash.         ED Course         Results for orders placed or performed during the hospital encounter of 05/03/22 (from the past 24 hour(s))   Basic metabolic panel   Result Value Ref Range    Sodium 135 134 - 144 mmol/L    Potassium 3.6 3.5 - 5.1 mmol/L    Chloride 102 98 - 107 mmol/L    Carbon Dioxide (CO2) 22 21 - 31 mmol/L    Anion Gap 11 3 - 14 mmol/L    Urea Nitrogen 4 (L) 7 - 25 mg/dL    Creatinine 0.53 (L) 0.60 - 1.20 mg/dL    Calcium 8.4 (L) 8.6 - 10.3 mg/dL    Glucose 89 70 - 105 mg/dL    GFR Estimate >90 >60 mL/min/1.73m2   CBC with platelets differential    Narrative    The following orders were created for panel order CBC with platelets differential.  Procedure                               Abnormality         Status                     ---------                               -----------         ------                     CBC with platelets and d...[[id number]]  Abnormal            Final result                 Please view results for these tests on the individual orders.   CBC with platelets and differential   Result Value Ref Range    WBC Count 17.0 (H) 4.0 -  11.0 10e3/uL    RBC Count 3.34 (L) 3.80 - 5.20 10e6/uL    Hemoglobin 9.5 (L) 11.7 - 15.7 g/dL    Hematocrit 28.2 (L) 35.0 - 47.0 %    MCV 84 78 - 100 fL    MCH 28.4 26.5 - 33.0 pg    MCHC 33.7 31.5 - 36.5 g/dL    RDW 13.0 10.0 - 15.0 %    Platelet Count 262 150 - 450 10e3/uL    % Neutrophils 87 %    % Lymphocytes 6 %    % Monocytes 5 %    % Eosinophils 1 %    % Basophils 0 %    % Immature Granulocytes 1 %    NRBCs per 100 WBC 0 <1 /100    Absolute Neutrophils 14.8 (H) 1.6 - 8.3 10e3/uL    Absolute Lymphocytes 1.0 0.8 - 5.3 10e3/uL    Absolute Monocytes 0.9 0.0 - 1.3 10e3/uL    Absolute Eosinophils 0.2 0.0 - 0.7 10e3/uL    Absolute Basophils 0.0 0.0 - 0.2 10e3/uL    Absolute Immature Granulocytes 0.2 <=0.4 10e3/uL    Absolute NRBCs 0.0 10e3/uL       Medications   0.9% sodium chloride BOLUS (1,000 mLs Intravenous New Bag 5/3/22 1706)     Followed by   sodium chloride 0.9% infusion (has no administration in time range)       Assessments & Plan (with Medical Decision Making)     I have reviewed the nursing notes.    I have reviewed the findings, diagnosis, plan and need for follow up with the patient.  Near syncope: Differential diagnosis is broad includes benign vasovagal syncope, dehydration, migraine, anemia among others.  Reassuring clinical presentation reassuring lab work-up in the emergency department with resolution of symptoms over the course of her ER stay.  It was identified that she does have significant anemia.  She is on an over-the-counter multivitamin at this time we will place her on additional ferrous sulfate for a short time, explained her this can be nauseating and to stop it if she can tolerate it and follow-up with her OB provider.  Return to the ED with headache, syncope, recurrence of symptoms, vaginal or rectal bleeding.  Patient verbalized understanding of plan is in agreement she left the ED in improved condition.  New Prescriptions    FERROUS SULFATE (SLO-FE) 142 (45 FE) MG CR TABLET    Take  1 tablet (142 mg) by mouth 3 times daily (with meals)       Final diagnoses:   Near syncope   Anemia during pregnancy in third trimester       5/3/2022   [employer] MD  05/04/22 0926

## 2022-05-04 ASSESSMENT — ENCOUNTER SYMPTOMS
BACK PAIN: 0
FEVER: 0
CHEST TIGHTNESS: 0
HEADACHES: 0
DYSURIA: 0
CHILLS: 0
BLOOD IN STOOL: 0

## 2022-05-11 ENCOUNTER — PRENATAL OFFICE VISIT (OUTPATIENT)
Dept: OBGYN | Facility: OTHER | Age: 22
End: 2022-05-11
Attending: OBSTETRICS & GYNECOLOGY
Payer: COMMERCIAL

## 2022-05-11 VITALS
HEART RATE: 98 BPM | BODY MASS INDEX: 30.99 KG/M2 | DIASTOLIC BLOOD PRESSURE: 70 MMHG | SYSTOLIC BLOOD PRESSURE: 120 MMHG | WEIGHT: 164 LBS

## 2022-05-11 DIAGNOSIS — Z34.83 ENCOUNTER FOR SUPERVISION OF OTHER NORMAL PREGNANCY, THIRD TRIMESTER: Primary | ICD-10-CM

## 2022-05-11 PROCEDURE — 99207 PR OB VISIT-NO CHARGE - GICH ONLY: CPT | Performed by: OBSTETRICS & GYNECOLOGY

## 2022-05-11 ASSESSMENT — PAIN SCALES - GENERAL: PAINLEVEL: NO PAIN (0)

## 2022-05-11 NOTE — PROGRESS NOTES
Return OB Visit    S: Patient is feeling well. Better now that she is on TID iron. No ctx, VB or LOF. +FM    O: /70   Pulse 98   Wt 74.4 kg (164 lb)   LMP 2021 (Exact Date)   BMI 30.99 kg/m    Gen: Well-appearing, NAD  See OB Flowsheet    A/P:  Bebe Carl is a 21 year old  at 35w4d by 20w2d  US, here for return OB visit.  Bipolar disorder: on zoloft, improved  Postpartum hemorrhage secondary to vaginal laceration, required transfusion  SMA carrier: no longer with FOB, does not plan to have him tested  Dating: will redate based on ultrasound- had irregular menses, was on OCPs and took plan B  Anemia: on iron  Plans breastfeeding, epidural  Desires sterilization: discussed risk of regret, especially given her young age. She is certain she has completed childbearing. FTP signed 3/15/22. Plans interval tubal unless she has c/s, then wants done at time of c/s     PNC: Rh positive, Rubella immune, GCT 84  Genetics: previously normal CF screening. Normal aneuploidy screen  Imaging: dating and anatomy US at 20w2d, normal except HC 3%ile, normal BPD. Repeat scheduled   Immunizations: declines flu, COVID  RTC weekly until delivery    Mandie Shook MD FACOG  OB/GYN  2022 2:13 PM

## 2022-05-18 ENCOUNTER — PRENATAL OFFICE VISIT (OUTPATIENT)
Dept: OBGYN | Facility: OTHER | Age: 22
End: 2022-05-18
Attending: OBSTETRICS & GYNECOLOGY
Payer: COMMERCIAL

## 2022-05-18 VITALS
WEIGHT: 165 LBS | BODY MASS INDEX: 31.18 KG/M2 | HEART RATE: 98 BPM | DIASTOLIC BLOOD PRESSURE: 74 MMHG | SYSTOLIC BLOOD PRESSURE: 120 MMHG

## 2022-05-18 DIAGNOSIS — Z34.83 ENCOUNTER FOR SUPERVISION OF OTHER NORMAL PREGNANCY, THIRD TRIMESTER: Primary | ICD-10-CM

## 2022-05-18 PROCEDURE — 99207 PR OB VISIT-NO CHARGE - GICH ONLY: CPT | Performed by: OBSTETRICS & GYNECOLOGY

## 2022-05-18 PROCEDURE — 87653 STREP B DNA AMP PROBE: CPT | Mod: ZL | Performed by: OBSTETRICS & GYNECOLOGY

## 2022-05-18 ASSESSMENT — PAIN SCALES - GENERAL: PAINLEVEL: MILD PAIN (2)

## 2022-05-18 NOTE — NURSING NOTE
"Chief Complaint   Patient presents with     Prenatal Care     36 weeks 4 days   Pt presents to clinic today for prenatal care 36 weeks 4 days. Pt denies any bleeding, or leakage of fluid at this time. States baby is moving good. Patient denies contractions.     Initial /74   Pulse 98   Wt 74.8 kg (165 lb)   LMP 09/04/2021 (Exact Date)   BMI 31.18 kg/m   Estimated body mass index is 31.18 kg/m  as calculated from the following:    Height as of 3/20/22: 1.549 m (5' 1\").    Weight as of this encounter: 74.8 kg (165 lb).  Medication Reconciliation: complete        Lizabeth Engel LPN  "

## 2022-05-18 NOTE — PROGRESS NOTES
Return OB Visit    S: Patient is feeling well, getting uncomfortable. Some BH ctx, no VB or LOF. +FM    O: /74   Pulse 98   Wt 74.8 kg (165 lb)   LMP 2021 (Exact Date)   BMI 31.18 kg/m    Gen: Well-appearing, NAD  See OB Flowsheet    A/P:  Bebe Carl is a 21 year old  at 36w4d by 20w2d  US, here for return OB visit.  Bipolar disorder: on zoloft, improved  Postpartum hemorrhage secondary to vaginal laceration, required transfusion  SMA carrier: no longer with FOB, does not plan to have him tested  Dating: will redate based on ultrasound- had irregular menses, was on OCPs and took plan B  Anemia: on iron  Plans breastfeeding, epidural  Desires sterilization: discussed risk of regret, especially given her young age. She is certain she has completed childbearing. FTP signed 3/15/22. Plans interval tubal unless she has c/s, then wants done at time of c/s     PNC: Rh positive, Rubella immune, GCT 84  Genetics: previously normal CF screening. Normal aneuploidy screen  Imaging: dating and anatomy US at 20w2d, normal except HC 3%ile, normal BPD. Repeat scheduled   Immunizations: declines flu, COVID  RTC weekly until delivery    Mandie Shook MD FACOG  OB/GYN  2022 2:20 PM

## 2022-05-20 LAB — GP B STREP DNA SPEC QL NAA+PROBE: NEGATIVE

## 2022-05-25 ENCOUNTER — PRENATAL OFFICE VISIT (OUTPATIENT)
Dept: OBGYN | Facility: OTHER | Age: 22
End: 2022-05-25
Attending: OBSTETRICS & GYNECOLOGY
Payer: COMMERCIAL

## 2022-05-25 VITALS
WEIGHT: 167.4 LBS | HEART RATE: 92 BPM | HEIGHT: 61 IN | SYSTOLIC BLOOD PRESSURE: 112 MMHG | OXYGEN SATURATION: 99 % | DIASTOLIC BLOOD PRESSURE: 64 MMHG | RESPIRATION RATE: 16 BRPM | BODY MASS INDEX: 31.6 KG/M2

## 2022-05-25 DIAGNOSIS — Z34.83 ENCOUNTER FOR SUPERVISION OF OTHER NORMAL PREGNANCY, THIRD TRIMESTER: Primary | ICD-10-CM

## 2022-05-25 PROCEDURE — 99207 PR OB VISIT-NO CHARGE - GICH ONLY: CPT | Performed by: OBSTETRICS & GYNECOLOGY

## 2022-05-25 NOTE — PROGRESS NOTES
"Return OB Visit    S: Patient is feeling well. No ctx, VB or LOF. +FM. Ready to have baby. Is going camping at [place] this weekend, knows where the nearest hospital is.     O: /64 (BP Location: Right arm, Patient Position: Sitting, Cuff Size: Adult Regular)   Pulse 92   Resp 16   Ht 1.549 m (5' 1\")   Wt 75.9 kg (167 lb 6.4 oz)   LMP 2021 (Exact Date)   SpO2 99%   Breastfeeding No   BMI 31.63 kg/m    Gen: Well-appearing, NAD  See OB Flowsheet    A/P:  Bebe Carl is a 21 year old  at 37w4d by 20w2d  US, here for return OB visit.  Bipolar disorder: on zoloft, improved  Postpartum hemorrhage secondary to vaginal laceration, required transfusion  SMA carrier: no longer with FOB, does not plan to have him tested  Dating: will redate based on ultrasound- had irregular menses, was on OCPs and took plan B  Anemia: on iron  Plans breastfeeding, epidural  Desires sterilization: discussed risk of regret, especially given her young age. She is certain she has completed childbearing. FTP signed 3/15/22. Plans interval tubal unless she has c/s, then wants done at time of c/s     PNC: Rh positive, Rubella immune, GCT 84, GBS negative  Genetics: previously normal CF screening. Normal aneuploidy screen  Imaging: dating and anatomy US at 20w2d, normal except HC 3%ile, normal BPD. Repeat scheduled   Immunizations: declines flu, COVID  RTC weekly until delivery    Mandie Shook MD FACOG  OB/GYN  2022 2:16 PM     "

## 2022-05-25 NOTE — PROGRESS NOTES
"Pt. Here for her prenatal check today. No concerns, baby active, no leaking of fluids or bleeding and no contractions.     Chief Complaint   Patient presents with     Prenatal Care     37w 4d       Initial Ht 1.549 m (5' 1\")   Wt 75.9 kg (167 lb 6.4 oz)   LMP 09/04/2021 (Exact Date)   Breastfeeding No   BMI 31.63 kg/m   Estimated body mass index is 31.63 kg/m  as calculated from the following:    Height as of this encounter: 1.549 m (5' 1\").    Weight as of this encounter: 75.9 kg (167 lb 6.4 oz).  Medication Reconciliation: complete    Germaine Shook RN    "

## 2022-06-01 ENCOUNTER — PRENATAL OFFICE VISIT (OUTPATIENT)
Dept: OBGYN | Facility: OTHER | Age: 22
End: 2022-06-01
Attending: OBSTETRICS & GYNECOLOGY
Payer: COMMERCIAL

## 2022-06-01 VITALS
BODY MASS INDEX: 31.46 KG/M2 | SYSTOLIC BLOOD PRESSURE: 112 MMHG | HEART RATE: 87 BPM | OXYGEN SATURATION: 99 % | WEIGHT: 166.5 LBS | DIASTOLIC BLOOD PRESSURE: 64 MMHG | RESPIRATION RATE: 16 BRPM

## 2022-06-01 DIAGNOSIS — Z34.83 ENCOUNTER FOR SUPERVISION OF OTHER NORMAL PREGNANCY, THIRD TRIMESTER: Primary | ICD-10-CM

## 2022-06-01 PROCEDURE — 99207 PR OB VISIT-NO CHARGE - GICH ONLY: CPT | Performed by: OBSTETRICS & GYNECOLOGY

## 2022-06-01 PROCEDURE — G0463 HOSPITAL OUTPT CLINIC VISIT: HCPCS

## 2022-06-01 RX ORDER — KETOROLAC TROMETHAMINE 30 MG/ML
30 INJECTION, SOLUTION INTRAMUSCULAR; INTRAVENOUS
Status: CANCELLED | OUTPATIENT
Start: 2022-06-01 | End: 2022-06-06

## 2022-06-01 RX ORDER — NALOXONE HYDROCHLORIDE 0.4 MG/ML
0.4 INJECTION, SOLUTION INTRAMUSCULAR; INTRAVENOUS; SUBCUTANEOUS
Status: CANCELLED | OUTPATIENT
Start: 2022-06-01

## 2022-06-01 RX ORDER — SODIUM CHLORIDE, SODIUM LACTATE, POTASSIUM CHLORIDE, CALCIUM CHLORIDE 600; 310; 30; 20 MG/100ML; MG/100ML; MG/100ML; MG/100ML
INJECTION, SOLUTION INTRAVENOUS CONTINUOUS
Status: CANCELLED | OUTPATIENT
Start: 2022-06-01

## 2022-06-01 RX ORDER — METOCLOPRAMIDE 10 MG/1
10 TABLET ORAL EVERY 6 HOURS PRN
Status: CANCELLED | OUTPATIENT
Start: 2022-06-01

## 2022-06-01 RX ORDER — OXYTOCIN 10 [USP'U]/ML
10 INJECTION, SOLUTION INTRAMUSCULAR; INTRAVENOUS
Status: CANCELLED | OUTPATIENT
Start: 2022-06-01

## 2022-06-01 RX ORDER — METHYLERGONOVINE MALEATE 0.2 MG/ML
200 INJECTION INTRAVENOUS
Status: CANCELLED | OUTPATIENT
Start: 2022-06-01

## 2022-06-01 RX ORDER — OXYTOCIN/0.9 % SODIUM CHLORIDE 30/500 ML
100-340 PLASTIC BAG, INJECTION (ML) INTRAVENOUS CONTINUOUS PRN
Status: CANCELLED | OUTPATIENT
Start: 2022-06-01

## 2022-06-01 RX ORDER — NALOXONE HYDROCHLORIDE 0.4 MG/ML
0.2 INJECTION, SOLUTION INTRAMUSCULAR; INTRAVENOUS; SUBCUTANEOUS
Status: CANCELLED | OUTPATIENT
Start: 2022-06-01

## 2022-06-01 RX ORDER — IBUPROFEN 200 MG
800 TABLET ORAL
Status: CANCELLED | OUTPATIENT
Start: 2022-06-01 | End: 2022-06-06

## 2022-06-01 RX ORDER — METOCLOPRAMIDE HYDROCHLORIDE 5 MG/ML
10 INJECTION INTRAMUSCULAR; INTRAVENOUS EVERY 6 HOURS PRN
Status: CANCELLED | OUTPATIENT
Start: 2022-06-01

## 2022-06-01 RX ORDER — CITRIC ACID/SODIUM CITRATE 334-500MG
30 SOLUTION, ORAL ORAL
Status: CANCELLED | OUTPATIENT
Start: 2022-06-01

## 2022-06-01 RX ORDER — ONDANSETRON 2 MG/ML
4 INJECTION INTRAMUSCULAR; INTRAVENOUS EVERY 6 HOURS PRN
Status: CANCELLED | OUTPATIENT
Start: 2022-06-01

## 2022-06-01 RX ORDER — MISOPROSTOL 100 UG/1
400 TABLET ORAL
Status: CANCELLED | OUTPATIENT
Start: 2022-06-01

## 2022-06-01 RX ORDER — TRANEXAMIC ACID 10 MG/ML
1 INJECTION, SOLUTION INTRAVENOUS EVERY 30 MIN PRN
Status: CANCELLED | OUTPATIENT
Start: 2022-06-01

## 2022-06-01 RX ORDER — ONDANSETRON 4 MG/1
4 TABLET, ORALLY DISINTEGRATING ORAL EVERY 6 HOURS PRN
Status: CANCELLED | OUTPATIENT
Start: 2022-06-01

## 2022-06-01 RX ORDER — TERBUTALINE SULFATE 1 MG/ML
0.25 INJECTION, SOLUTION SUBCUTANEOUS
Status: CANCELLED | OUTPATIENT
Start: 2022-06-01

## 2022-06-01 RX ORDER — SODIUM CHLORIDE, SODIUM LACTATE, POTASSIUM CHLORIDE, CALCIUM CHLORIDE 600; 310; 30; 20 MG/100ML; MG/100ML; MG/100ML; MG/100ML
INJECTION, SOLUTION INTRAVENOUS CONTINUOUS PRN
Status: CANCELLED | OUTPATIENT
Start: 2022-06-01

## 2022-06-01 RX ORDER — PROCHLORPERAZINE MALEATE 10 MG
10 TABLET ORAL EVERY 6 HOURS PRN
Status: CANCELLED | OUTPATIENT
Start: 2022-06-01

## 2022-06-01 RX ORDER — CARBOPROST TROMETHAMINE 250 UG/ML
250 INJECTION, SOLUTION INTRAMUSCULAR
Status: CANCELLED | OUTPATIENT
Start: 2022-06-01

## 2022-06-01 RX ORDER — OXYTOCIN/0.9 % SODIUM CHLORIDE 30/500 ML
340 PLASTIC BAG, INJECTION (ML) INTRAVENOUS CONTINUOUS PRN
Status: CANCELLED | OUTPATIENT
Start: 2022-06-01

## 2022-06-01 RX ORDER — PROCHLORPERAZINE 25 MG
25 SUPPOSITORY, RECTAL RECTAL EVERY 12 HOURS PRN
Status: CANCELLED | OUTPATIENT
Start: 2022-06-01

## 2022-06-01 RX ORDER — OXYTOCIN/0.9 % SODIUM CHLORIDE 30/500 ML
1-24 PLASTIC BAG, INJECTION (ML) INTRAVENOUS CONTINUOUS
Status: CANCELLED | OUTPATIENT
Start: 2022-06-01

## 2022-06-01 RX ORDER — LIDOCAINE 40 MG/G
CREAM TOPICAL
Status: CANCELLED | OUTPATIENT
Start: 2022-06-01

## 2022-06-01 ASSESSMENT — PAIN SCALES - GENERAL: PAINLEVEL: NO PAIN (0)

## 2022-06-01 NOTE — NURSING NOTE
"Patient presents to the clinic today for an OB check.    Chief Complaint   Patient presents with     Prenatal Care     38w4d       Initial /64 (BP Location: Right arm, Patient Position: Sitting, Cuff Size: Adult Regular)   Pulse 87   Resp 16   Wt 75.5 kg (166 lb 8 oz)   LMP 09/04/2021 (Exact Date)   SpO2 99%   Breastfeeding No   BMI 31.46 kg/m   Estimated body mass index is 31.46 kg/m  as calculated from the following:    Height as of 5/25/22: 1.549 m (5' 1\").    Weight as of this encounter: 75.5 kg (166 lb 8 oz).  Medication Reconciliation: complete  Tracy Hennessy LPN    "

## 2022-06-01 NOTE — PROGRESS NOTES
Return OB Visit    S: Patient is uncomfortable, ready to be done. No ctx, VB or LOF. +FM    O: /64 (BP Location: Right arm, Patient Position: Sitting, Cuff Size: Adult Regular)   Pulse 87   Resp 16   Wt 75.5 kg (166 lb 8 oz)   LMP 2021 (Exact Date)   SpO2 99%   Breastfeeding No   BMI 31.46 kg/m    Gen: Well-appearing, NAD  See OB Flowsheet    A/P:  Bebe Carl is a 21 year old  at 38w4d by 20w2d  US, here for return OB visit.  Bipolar disorder: on zoloft, improved  Postpartum hemorrhage secondary to vaginal laceration, required transfusion  SMA carrier: no longer with FOB, does not plan to have him tested  Dating: will redate based on ultrasound- had irregular menses, was on OCPs and took plan B  Anemia: on iron  Plans breastfeeding, epidural  Desires sterilization: discussed risk of regret, especially given her young age. She is certain she has completed childbearing. FTP signed 3/15/22. Plans interval tubal unless she has c/s, then wants done at time of c/s     PNC: Rh positive, Rubella immune, GCT 84, GBS negative  Genetics: previously normal CF screening. Normal aneuploidy screen  Imaging: dating and anatomy US at 20w2d, normal except HC 3%ile, normal BPD. Repeat scheduled   Immunizations: declines flu, COVID  RTC weekly until delivery- IOL scheduled for 22    Mandie Shook MD FACOG  OB/GYN  2022 2:03 PM

## 2022-06-06 ENCOUNTER — HOSPITAL ENCOUNTER (INPATIENT)
Facility: OTHER | Age: 22
LOS: 1 days | Discharge: HOME | End: 2022-06-07
Attending: OBSTETRICS & GYNECOLOGY | Admitting: OBSTETRICS & GYNECOLOGY
Payer: COMMERCIAL

## 2022-06-06 ENCOUNTER — ANESTHESIA EVENT (OUTPATIENT)
Dept: OBGYN | Facility: OTHER | Age: 22
End: 2022-06-06
Payer: COMMERCIAL

## 2022-06-06 ENCOUNTER — ANESTHESIA (OUTPATIENT)
Dept: OBGYN | Facility: OTHER | Age: 22
End: 2022-06-06
Payer: COMMERCIAL

## 2022-06-06 DIAGNOSIS — Z34.83 ENCOUNTER FOR SUPERVISION OF OTHER NORMAL PREGNANCY, THIRD TRIMESTER: ICD-10-CM

## 2022-06-06 PROBLEM — Z34.90 ENCOUNTER FOR ELECTIVE INDUCTION OF LABOR: Status: ACTIVE | Noted: 2022-06-06

## 2022-06-06 LAB
ABO/RH(D): NORMAL
ANTIBODY SCREEN: NEGATIVE
ERYTHROCYTE [DISTWIDTH] IN BLOOD BY AUTOMATED COUNT: 14.7 % (ref 10–15)
FLUAV RNA SPEC QL NAA+PROBE: NEGATIVE
FLUBV RNA RESP QL NAA+PROBE: NEGATIVE
HCT VFR BLD AUTO: 29.3 % (ref 35–47)
HGB BLD-MCNC: 9.8 G/DL (ref 11.7–15.7)
MCH RBC QN AUTO: 27.3 PG (ref 26.5–33)
MCHC RBC AUTO-ENTMCNC: 33.4 G/DL (ref 31.5–36.5)
MCV RBC AUTO: 82 FL (ref 78–100)
PLATELET # BLD AUTO: 263 10E3/UL (ref 150–450)
RBC # BLD AUTO: 3.59 10E6/UL (ref 3.8–5.2)
RSV RNA SPEC NAA+PROBE: NEGATIVE
SARS-COV-2 RNA RESP QL NAA+PROBE: NEGATIVE
SPECIMEN EXPIRATION DATE: NORMAL
T PALLIDUM AB SER QL: NONREACTIVE
WBC # BLD AUTO: 10.5 10E3/UL (ref 4–11)

## 2022-06-06 PROCEDURE — 250N000011 HC RX IP 250 OP 636: Performed by: OBSTETRICS & GYNECOLOGY

## 2022-06-06 PROCEDURE — 86850 RBC ANTIBODY SCREEN: CPT | Performed by: OBSTETRICS & GYNECOLOGY

## 2022-06-06 PROCEDURE — 86901 BLOOD TYPING SEROLOGIC RH(D): CPT | Performed by: OBSTETRICS & GYNECOLOGY

## 2022-06-06 PROCEDURE — 86780 TREPONEMA PALLIDUM: CPT | Performed by: OBSTETRICS & GYNECOLOGY

## 2022-06-06 PROCEDURE — 59400 OBSTETRICAL CARE: CPT | Performed by: NURSE ANESTHETIST, CERTIFIED REGISTERED

## 2022-06-06 PROCEDURE — 59400 OBSTETRICAL CARE: CPT | Performed by: OBSTETRICS & GYNECOLOGY

## 2022-06-06 PROCEDURE — 87637 SARSCOV2&INF A&B&RSV AMP PRB: CPT | Performed by: OBSTETRICS & GYNECOLOGY

## 2022-06-06 PROCEDURE — 250N000009 HC RX 250: Performed by: NURSE ANESTHETIST, CERTIFIED REGISTERED

## 2022-06-06 PROCEDURE — 0KQM0ZZ REPAIR PERINEUM MUSCLE, OPEN APPROACH: ICD-10-PCS | Performed by: OBSTETRICS & GYNECOLOGY

## 2022-06-06 PROCEDURE — 370N000003 HC ANESTHESIA WARD SERVICE

## 2022-06-06 PROCEDURE — 10907ZC DRAINAGE OF AMNIOTIC FLUID, THERAPEUTIC FROM PRODUCTS OF CONCEPTION, VIA NATURAL OR ARTIFICIAL OPENING: ICD-10-PCS | Performed by: OBSTETRICS & GYNECOLOGY

## 2022-06-06 PROCEDURE — 36415 COLL VENOUS BLD VENIPUNCTURE: CPT | Performed by: OBSTETRICS & GYNECOLOGY

## 2022-06-06 PROCEDURE — 120N000001 HC R&B MED SURG/OB

## 2022-06-06 PROCEDURE — 250N000011 HC RX IP 250 OP 636: Performed by: NURSE ANESTHETIST, CERTIFIED REGISTERED

## 2022-06-06 PROCEDURE — 722N000001 HC LABOR CARE VAGINAL DELIVERY SINGLE

## 2022-06-06 PROCEDURE — 85027 COMPLETE CBC AUTOMATED: CPT | Performed by: OBSTETRICS & GYNECOLOGY

## 2022-06-06 PROCEDURE — 250N000009 HC RX 250: Performed by: OBSTETRICS & GYNECOLOGY

## 2022-06-06 PROCEDURE — 3E033VJ INTRODUCTION OF OTHER HORMONE INTO PERIPHERAL VEIN, PERCUTANEOUS APPROACH: ICD-10-PCS | Performed by: OBSTETRICS & GYNECOLOGY

## 2022-06-06 PROCEDURE — 258N000003 HC RX IP 258 OP 636: Performed by: OBSTETRICS & GYNECOLOGY

## 2022-06-06 PROCEDURE — 250N000013 HC RX MED GY IP 250 OP 250 PS 637: Performed by: OBSTETRICS & GYNECOLOGY

## 2022-06-06 RX ORDER — CITRIC ACID/SODIUM CITRATE 334-500MG
30 SOLUTION, ORAL ORAL
Status: DISCONTINUED | OUTPATIENT
Start: 2022-06-06 | End: 2022-06-06 | Stop reason: HOSPADM

## 2022-06-06 RX ORDER — MISOPROSTOL 100 UG/1
400 TABLET ORAL
Status: DISCONTINUED | OUTPATIENT
Start: 2022-06-06 | End: 2022-06-06 | Stop reason: HOSPADM

## 2022-06-06 RX ORDER — METHYLERGONOVINE MALEATE 0.2 MG/ML
200 INJECTION INTRAVENOUS
Status: DISCONTINUED | OUTPATIENT
Start: 2022-06-06 | End: 2022-06-06 | Stop reason: HOSPADM

## 2022-06-06 RX ORDER — OXYTOCIN/0.9 % SODIUM CHLORIDE 30/500 ML
340 PLASTIC BAG, INJECTION (ML) INTRAVENOUS CONTINUOUS PRN
Status: DISCONTINUED | OUTPATIENT
Start: 2022-06-06 | End: 2022-06-07 | Stop reason: HOSPADM

## 2022-06-06 RX ORDER — KETOROLAC TROMETHAMINE 30 MG/ML
30 INJECTION, SOLUTION INTRAMUSCULAR; INTRAVENOUS
Status: DISCONTINUED | OUTPATIENT
Start: 2022-06-06 | End: 2022-06-06

## 2022-06-06 RX ORDER — NALOXONE HYDROCHLORIDE 0.4 MG/ML
0.2 INJECTION, SOLUTION INTRAMUSCULAR; INTRAVENOUS; SUBCUTANEOUS
Status: DISCONTINUED | OUTPATIENT
Start: 2022-06-06 | End: 2022-06-06 | Stop reason: HOSPADM

## 2022-06-06 RX ORDER — TERBUTALINE SULFATE 1 MG/ML
0.25 INJECTION, SOLUTION SUBCUTANEOUS
Status: DISCONTINUED | OUTPATIENT
Start: 2022-06-06 | End: 2022-06-06 | Stop reason: HOSPADM

## 2022-06-06 RX ORDER — FENTANYL CITRATE-0.9 % NACL/PF 10 MCG/ML
100 PLASTIC BAG, INJECTION (ML) INTRAVENOUS EVERY 5 MIN PRN
Status: DISCONTINUED | OUTPATIENT
Start: 2022-06-06 | End: 2022-06-06 | Stop reason: HOSPADM

## 2022-06-06 RX ORDER — TRANEXAMIC ACID 10 MG/ML
1 INJECTION, SOLUTION INTRAVENOUS EVERY 30 MIN PRN
Status: DISCONTINUED | OUTPATIENT
Start: 2022-06-06 | End: 2022-06-06 | Stop reason: HOSPADM

## 2022-06-06 RX ORDER — OXYTOCIN 10 [USP'U]/ML
10 INJECTION, SOLUTION INTRAMUSCULAR; INTRAVENOUS
Status: DISCONTINUED | OUTPATIENT
Start: 2022-06-06 | End: 2022-06-06 | Stop reason: HOSPADM

## 2022-06-06 RX ORDER — PROCHLORPERAZINE 25 MG
25 SUPPOSITORY, RECTAL RECTAL EVERY 12 HOURS PRN
Status: DISCONTINUED | OUTPATIENT
Start: 2022-06-06 | End: 2022-06-06 | Stop reason: HOSPADM

## 2022-06-06 RX ORDER — ACETAMINOPHEN 325 MG/1
650 TABLET ORAL EVERY 4 HOURS PRN
Status: DISCONTINUED | OUTPATIENT
Start: 2022-06-06 | End: 2022-06-07 | Stop reason: HOSPADM

## 2022-06-06 RX ORDER — LIDOCAINE HYDROCHLORIDE AND EPINEPHRINE 15; 5 MG/ML; UG/ML
INJECTION, SOLUTION EPIDURAL PRN
Status: DISCONTINUED | OUTPATIENT
Start: 2022-06-06 | End: 2022-06-06

## 2022-06-06 RX ORDER — SODIUM CHLORIDE, SODIUM LACTATE, POTASSIUM CHLORIDE, CALCIUM CHLORIDE 600; 310; 30; 20 MG/100ML; MG/100ML; MG/100ML; MG/100ML
INJECTION, SOLUTION INTRAVENOUS CONTINUOUS
Status: DISCONTINUED | OUTPATIENT
Start: 2022-06-06 | End: 2022-06-06 | Stop reason: HOSPADM

## 2022-06-06 RX ORDER — LIDOCAINE HYDROCHLORIDE 10 MG/ML
INJECTION, SOLUTION EPIDURAL; INFILTRATION; INTRACAUDAL; PERINEURAL PRN
Status: DISCONTINUED | OUTPATIENT
Start: 2022-06-06 | End: 2022-06-06

## 2022-06-06 RX ORDER — BISACODYL 10 MG
10 SUPPOSITORY, RECTAL RECTAL DAILY PRN
Status: DISCONTINUED | OUTPATIENT
Start: 2022-06-06 | End: 2022-06-07 | Stop reason: HOSPADM

## 2022-06-06 RX ORDER — OXYTOCIN 10 [USP'U]/ML
10 INJECTION, SOLUTION INTRAMUSCULAR; INTRAVENOUS
Status: DISCONTINUED | OUTPATIENT
Start: 2022-06-06 | End: 2022-06-07 | Stop reason: HOSPADM

## 2022-06-06 RX ORDER — SERTRALINE HYDROCHLORIDE 25 MG/1
25 TABLET, FILM COATED ORAL DAILY
Status: DISCONTINUED | OUTPATIENT
Start: 2022-06-06 | End: 2022-06-07 | Stop reason: HOSPADM

## 2022-06-06 RX ORDER — CARBOPROST TROMETHAMINE 250 UG/ML
250 INJECTION, SOLUTION INTRAMUSCULAR
Status: DISCONTINUED | OUTPATIENT
Start: 2022-06-06 | End: 2022-06-07 | Stop reason: HOSPADM

## 2022-06-06 RX ORDER — MODIFIED LANOLIN
OINTMENT (GRAM) TOPICAL
Status: DISCONTINUED | OUTPATIENT
Start: 2022-06-06 | End: 2022-06-07 | Stop reason: HOSPADM

## 2022-06-06 RX ORDER — MISOPROSTOL 100 UG/1
400 TABLET ORAL
Status: DISCONTINUED | OUTPATIENT
Start: 2022-06-06 | End: 2022-06-07 | Stop reason: HOSPADM

## 2022-06-06 RX ORDER — METOCLOPRAMIDE 10 MG/1
10 TABLET ORAL EVERY 6 HOURS PRN
Status: DISCONTINUED | OUTPATIENT
Start: 2022-06-06 | End: 2022-06-06 | Stop reason: HOSPADM

## 2022-06-06 RX ORDER — OXYTOCIN/0.9 % SODIUM CHLORIDE 30/500 ML
1-24 PLASTIC BAG, INJECTION (ML) INTRAVENOUS CONTINUOUS
Status: DISCONTINUED | OUTPATIENT
Start: 2022-06-06 | End: 2022-06-06 | Stop reason: HOSPADM

## 2022-06-06 RX ORDER — METOCLOPRAMIDE HYDROCHLORIDE 5 MG/ML
10 INJECTION INTRAMUSCULAR; INTRAVENOUS EVERY 6 HOURS PRN
Status: DISCONTINUED | OUTPATIENT
Start: 2022-06-06 | End: 2022-06-06 | Stop reason: HOSPADM

## 2022-06-06 RX ORDER — NALOXONE HYDROCHLORIDE 0.4 MG/ML
0.4 INJECTION, SOLUTION INTRAMUSCULAR; INTRAVENOUS; SUBCUTANEOUS
Status: DISCONTINUED | OUTPATIENT
Start: 2022-06-06 | End: 2022-06-06 | Stop reason: HOSPADM

## 2022-06-06 RX ORDER — DOCUSATE SODIUM 100 MG/1
100 CAPSULE, LIQUID FILLED ORAL DAILY
Status: DISCONTINUED | OUTPATIENT
Start: 2022-06-06 | End: 2022-06-07 | Stop reason: HOSPADM

## 2022-06-06 RX ORDER — TRANEXAMIC ACID 10 MG/ML
1 INJECTION, SOLUTION INTRAVENOUS EVERY 30 MIN PRN
Status: DISCONTINUED | OUTPATIENT
Start: 2022-06-06 | End: 2022-06-07 | Stop reason: HOSPADM

## 2022-06-06 RX ORDER — HYDROCORTISONE 25 MG/G
CREAM TOPICAL 3 TIMES DAILY PRN
Status: DISCONTINUED | OUTPATIENT
Start: 2022-06-06 | End: 2022-06-07 | Stop reason: HOSPADM

## 2022-06-06 RX ORDER — OXYTOCIN 10 [USP'U]/ML
10 INJECTION, SOLUTION INTRAMUSCULAR; INTRAVENOUS
Status: DISCONTINUED | OUTPATIENT
Start: 2022-06-06 | End: 2022-06-06

## 2022-06-06 RX ORDER — OXYTOCIN/0.9 % SODIUM CHLORIDE 30/500 ML
100-340 PLASTIC BAG, INJECTION (ML) INTRAVENOUS CONTINUOUS PRN
Status: DISCONTINUED | OUTPATIENT
Start: 2022-06-06 | End: 2022-06-06

## 2022-06-06 RX ORDER — IBUPROFEN 400 MG/1
800 TABLET, FILM COATED ORAL EVERY 6 HOURS PRN
Status: DISCONTINUED | OUTPATIENT
Start: 2022-06-06 | End: 2022-06-07 | Stop reason: HOSPADM

## 2022-06-06 RX ORDER — METHYLERGONOVINE MALEATE 0.2 MG/ML
200 INJECTION INTRAVENOUS
Status: DISCONTINUED | OUTPATIENT
Start: 2022-06-06 | End: 2022-06-07 | Stop reason: HOSPADM

## 2022-06-06 RX ORDER — ONDANSETRON 4 MG/1
4 TABLET, ORALLY DISINTEGRATING ORAL EVERY 6 HOURS PRN
Status: DISCONTINUED | OUTPATIENT
Start: 2022-06-06 | End: 2022-06-06 | Stop reason: HOSPADM

## 2022-06-06 RX ORDER — NALBUPHINE HYDROCHLORIDE 10 MG/ML
2.5-5 INJECTION, SOLUTION INTRAMUSCULAR; INTRAVENOUS; SUBCUTANEOUS EVERY 6 HOURS PRN
Status: DISCONTINUED | OUTPATIENT
Start: 2022-06-06 | End: 2022-06-06

## 2022-06-06 RX ORDER — LIDOCAINE 40 MG/G
CREAM TOPICAL
Status: DISCONTINUED | OUTPATIENT
Start: 2022-06-06 | End: 2022-06-06 | Stop reason: HOSPADM

## 2022-06-06 RX ORDER — ONDANSETRON 2 MG/ML
4 INJECTION INTRAMUSCULAR; INTRAVENOUS EVERY 6 HOURS PRN
Status: DISCONTINUED | OUTPATIENT
Start: 2022-06-06 | End: 2022-06-06 | Stop reason: HOSPADM

## 2022-06-06 RX ORDER — IBUPROFEN 400 MG/1
800 TABLET, FILM COATED ORAL
Status: DISCONTINUED | OUTPATIENT
Start: 2022-06-06 | End: 2022-06-06

## 2022-06-06 RX ORDER — SODIUM CHLORIDE, SODIUM LACTATE, POTASSIUM CHLORIDE, CALCIUM CHLORIDE 600; 310; 30; 20 MG/100ML; MG/100ML; MG/100ML; MG/100ML
INJECTION, SOLUTION INTRAVENOUS CONTINUOUS PRN
Status: DISCONTINUED | OUTPATIENT
Start: 2022-06-06 | End: 2022-06-06 | Stop reason: HOSPADM

## 2022-06-06 RX ORDER — CARBOPROST TROMETHAMINE 250 UG/ML
250 INJECTION, SOLUTION INTRAMUSCULAR
Status: DISCONTINUED | OUTPATIENT
Start: 2022-06-06 | End: 2022-06-06 | Stop reason: HOSPADM

## 2022-06-06 RX ORDER — OXYTOCIN/0.9 % SODIUM CHLORIDE 30/500 ML
340 PLASTIC BAG, INJECTION (ML) INTRAVENOUS CONTINUOUS PRN
Status: DISCONTINUED | OUTPATIENT
Start: 2022-06-06 | End: 2022-06-06 | Stop reason: HOSPADM

## 2022-06-06 RX ORDER — PROCHLORPERAZINE MALEATE 10 MG
10 TABLET ORAL EVERY 6 HOURS PRN
Status: DISCONTINUED | OUTPATIENT
Start: 2022-06-06 | End: 2022-06-06 | Stop reason: HOSPADM

## 2022-06-06 RX ADMIN — LIDOCAINE HYDROCHLORIDE AND EPINEPHRINE 5 ML: 15; 5 INJECTION, SOLUTION EPIDURAL at 08:47

## 2022-06-06 RX ADMIN — LIDOCAINE HYDROCHLORIDE 20 ML: 10 INJECTION, SOLUTION INFILTRATION; PERINEURAL at 13:48

## 2022-06-06 RX ADMIN — ONDANSETRON 4 MG: 2 INJECTION INTRAMUSCULAR; INTRAVENOUS at 06:32

## 2022-06-06 RX ADMIN — Medication 2 MILLI-UNITS/MIN: at 06:42

## 2022-06-06 RX ADMIN — SODIUM CHLORIDE, POTASSIUM CHLORIDE, SODIUM LACTATE AND CALCIUM CHLORIDE: 600; 310; 30; 20 INJECTION, SOLUTION INTRAVENOUS at 09:53

## 2022-06-06 RX ADMIN — SODIUM CHLORIDE, POTASSIUM CHLORIDE, SODIUM LACTATE AND CALCIUM CHLORIDE: 600; 310; 30; 20 INJECTION, SOLUTION INTRAVENOUS at 06:46

## 2022-06-06 RX ADMIN — Medication 340 ML/HR: at 13:21

## 2022-06-06 RX ADMIN — IBUPROFEN 800 MG: 400 TABLET, FILM COATED ORAL at 17:15

## 2022-06-06 RX ADMIN — Medication 100 MCG: at 10:50

## 2022-06-06 RX ADMIN — BENZOCAINE AND LEVOMENTHOL: 200; 5 SPRAY TOPICAL at 20:30

## 2022-06-06 RX ADMIN — LIDOCAINE HYDROCHLORIDE 4 ML: 10 INJECTION, SOLUTION EPIDURAL; INFILTRATION; INTRACAUDAL; PERINEURAL at 08:37

## 2022-06-06 RX ADMIN — ACETAMINOPHEN 650 MG: 325 TABLET ORAL at 20:29

## 2022-06-06 RX ADMIN — Medication 10 ML/HR: at 08:55

## 2022-06-06 RX ADMIN — SODIUM CHLORIDE, POTASSIUM CHLORIDE, SODIUM LACTATE AND CALCIUM CHLORIDE: 600; 310; 30; 20 INJECTION, SOLUTION INTRAVENOUS at 08:23

## 2022-06-06 ASSESSMENT — ACTIVITIES OF DAILY LIVING (ADL)
ADLS_ACUITY_SCORE: 20
CONCENTRATING,_REMEMBERING_OR_MAKING_DECISIONS_DIFFICULTY: NO
ADLS_ACUITY_SCORE: 20
FALL_HISTORY_WITHIN_LAST_SIX_MONTHS: YES
ADLS_ACUITY_SCORE: 20
DOING_ERRANDS_INDEPENDENTLY_DIFFICULTY: NO
DIFFICULTY_EATING/SWALLOWING: NO
DRESSING/BATHING_DIFFICULTY: NO
ADLS_ACUITY_SCORE: 20
TOILETING_ISSUES: NO
ADLS_ACUITY_SCORE: 20
CHANGE_IN_FUNCTIONAL_STATUS_SINCE_ONSET_OF_CURRENT_ILLNESS/INJURY: NO
ADLS_ACUITY_SCORE: 20
ADLS_ACUITY_SCORE: 20
WEAR_GLASSES_OR_BLIND: YES
WALKING_OR_CLIMBING_STAIRS_DIFFICULTY: NO
NUMBER_OF_TIMES_PATIENT_HAS_FALLEN_WITHIN_LAST_SIX_MONTHS: 1
ADLS_ACUITY_SCORE: 20
ADLS_ACUITY_SCORE: 20

## 2022-06-06 NOTE — PROGRESS NOTES
Patient to [facility] with mother for induction of labor. To Room 406. EUM/EFM applied. Patient prepped for induction of labor after oriented to room and call light. Birgit Lucero RN on 6/6/2022 at 6:00 AM

## 2022-06-06 NOTE — PROGRESS NOTES
Intrapartum Progress Note    S: Patient is comfortable.     O: /59   Temp 97.5  F (36.4  C)   Resp 16   LMP 2021 (Exact Date)   SpO2 (!) 89%   Breastfeeding No    Gen: resting in bed, NAD  Cvx: 10/100/-1    FHT: 135, mod variability, + accels, occasional early decels  [place]: not tracing well, 3-4 contractions in 10 min    Membranes: s/p AROM, clear    A/P: 21 year old  at 39w2d by 20w2d US admitted for elective IOL for social situation  Bipolar disorder: zoloft  Hx of PPH secondary to vaginal laceration, required transfusion  SMA carrier, unknown FOB status  Desires sterilization- Plans PPTL if c/s, interval tubal if      FWB: Cat I, reactive. EFW 8.25 lbs  Labor: pitocin per protocol, ready to start pushing  Pain: epidudral  GBS: negative  Rh: positive  Rubella: immune  Continue routine labor management.   Anticipate     Mandie Shook MD 11:57 AM

## 2022-06-06 NOTE — PROGRESS NOTES
Epidural placed by CRNA. Time out performed. Pt currently resting in bed, denies pain. Mother at bedside.

## 2022-06-06 NOTE — PLAN OF CARE
Pt doing well after her vaginal delivery. Perineum swelling noted, diaper ice packs given, Ibuprofen given for pain. Bleeding light, fundus firm at umbilicus. Pt voided, ambulating in room independently. Infant is eager at breast, mother using good technique.   Alis Artis RN on 6/6/2022 at 6:22 PM    BP 93/54   Pulse 94   Temp 98.1  F (36.7  C) (Oral)   Resp 18   LMP 09/04/2021 (Exact Date)   SpO2 98%   Breastfeeding Unknown

## 2022-06-06 NOTE — PROGRESS NOTES
Pitocin initiated at 2mu after SVE. Patient is expecting a boy and plans on breastfeeding. 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 Has a significant support system but FOB is not involved. Birgit Lucero RN on 6/6/2022 at 7:57 AM

## 2022-06-06 NOTE — PROGRESS NOTES
Intrapartum Progress Note    S: Patient is comfortable with epidural.     O: /67 (BP Location: Right arm, Patient Position: Semi-[place]'s, Cuff Size: Adult Regular)   Temp 97.3  F (36.3  C) (Temporal)   Resp 16   LMP 2021 (Exact Date)   Breastfeeding No    Gen: resting in bed, NAD  Cvx: 3/70/-2    FHT: 135,mod variability, + accels, no decels  [place]: 3-4 contractions in 10 min    Membranes: AROM- clear    A/P: 21 year old  at 39w2d by 20w2d US admitted for elective IOL for social situation  Bipolar disorder: zoloft  Hx of PPH secondary to vaginal laceration, required transfusion  SMA carrier, unknown FOB status  Desires sterilization- Plans PPTL if c/s, interval tubal if     FWB: Cat I, reactive. EFW 8.25 lbs  Labor: pitocin per protocol, now s/p AROM  Pain: epidudral  GBS: negative  Rh: positive  Rubella: immune  Continue routine labor management.   Anticipate     Mandie Shook MD 9:25 AM

## 2022-06-06 NOTE — PROGRESS NOTES
Infant male born 2022 at 1320 via  by Dr. Mandie Shook. Infant was immediately placed skin to skin with mother. Mother plans to breastfeed. Infant ID bands were placed. [place] transition education completed at this time.

## 2022-06-06 NOTE — ANESTHESIA PREPROCEDURE EVALUATION
Anesthesia Pre-Procedure Evaluation    Patient: Bebe Carl   MRN: [id number] : 2000        Procedure : * No procedures listed *          Past Medical History:   Diagnosis Date     Acute gastroenteritis 2019     ADHD (attention deficit hyperactivity disorder)      Anemia     following delivery      Bipolar 1 disorder (H)      Depression      Depressive disorder      History of blood transfusion      Postpartum depression      Viral gastroenteritis 2019      Past Surgical History:   Procedure Laterality Date     APPENDECTOMY        Allergies   Allergen Reactions     Sulfa Drugs Hives and Anaphylaxis     Atomoxetine GI Disturbance     Weight loss     Latex Rash     bandaides only      Social History     Tobacco Use     Smoking status: Former Smoker     Packs/day: 1.00     Smokeless tobacco: Never Used   Substance Use Topics     Alcohol use: No      Wt Readings from Last 1 Encounters:   22 75.5 kg (166 lb 8 oz)        Anesthesia Evaluation   Pt has had prior anesthetic.     No history of anesthetic complications       ROS/MED HX  ENT/Pulmonary:       Neurologic:  - neg neurologic ROS     Cardiovascular:  - neg cardiovascular ROS     METS/Exercise Tolerance: >4 METS    Hematologic:  - neg hematologic  ROS     Musculoskeletal:  - neg musculoskeletal ROS     GI/Hepatic:     (+) GERD,     Renal/Genitourinary:       Endo:  - neg endo ROS     Psychiatric/Substance Use:     (+) psychiatric history     Infectious Disease:  - neg infectious disease ROS     Malignancy:  - neg malignancy ROS     Other:      (+) Possibly pregnant, ,         Physical Exam    Airway        Mallampati: II   TM distance: > 3 FB   Neck ROM: full     Respiratory Devices and Support         Dental  no notable dental history         Cardiovascular   cardiovascular exam normal       Rhythm and rate: regular and normal     Pulmonary   pulmonary exam normal        breath sounds clear to auscultation           OUTSIDE LABS:  CBC:    Lab Results   Component Value Date    WBC 10.5 06/06/2022    WBC 17.0 (H) 05/03/2022    HGB 9.8 (L) 06/06/2022    HGB 9.5 (L) 05/03/2022    HCT 29.3 (L) 06/06/2022    HCT 28.2 (L) 05/03/2022     06/06/2022     05/03/2022     BMP:   Lab Results   Component Value Date     05/03/2022     10/30/2021    POTASSIUM 3.6 05/03/2022    POTASSIUM 3.4 10/30/2021    CHLORIDE 102 05/03/2022    CHLORIDE 106 10/30/2021    CO2 22 05/03/2022    CO2 22 10/30/2021    BUN 4 (L) 05/03/2022    BUN 9 10/30/2021    CR 0.53 (L) 05/03/2022    CR 0.62 10/30/2021    GLC 89 05/03/2022    GLC 88 10/30/2021     COAGS: No results found for: PTT, INR, FIBR  POC:   Lab Results   Component Value Date    HCG Positive (A) 10/10/2021     HEPATIC:   Lab Results   Component Value Date    ALBUMIN 3.8 10/30/2021    PROTTOTAL 7.7 10/30/2021    ALT 11 10/30/2021    AST 11 10/30/2021    ALKPHOS 60 10/30/2021    BILITOTAL 1.2 10/30/2021    BILIDIRECT 0.39 (H) 12/20/2016     OTHER:   Lab Results   Component Value Date    LACT 0.8 02/26/2020    NILSON 8.4 (L) 05/03/2022    MAG 1.8 (L) 12/23/2019    TSH 0.66 10/30/2021       Anesthesia Plan    ASA Status:  2   NPO Status:  NPO Appropriate    Anesthesia Type: Epidural.              Consents    Anesthesia Plan(s) and associated risks, benefits, and realistic alternatives discussed. Questions answered and patient/representative(s) expressed understanding.    - Discussed:     - Discussed with:  Patient         Postoperative Care            Comments:                BONNIE Pham CRNA

## 2022-06-06 NOTE — L&D DELIVERY NOTE
OB Vaginal Delivery Note    Bebe Carl MRN# [id number]   Age: 21 year old YOB: 2000       GA: 39w2d  GP:   Labor Complications: None   EBL:   mL  Delivery QBL: 504 mL  Delivery Type: Vaginal, Spontaneous   ROM to Delivery Time: (Delivered) Hours: 4 Minutes: 0   Weight: 3.958 kg (8 lb 11.6 oz)    1 Minute 5 Minute 10 Minute   Apgar Totals: 8   9        MED CHOWDHURY     Delivery Details:  Bebe Carl, a 21 year old  who was admitted at 39w2d for elective IOL for social situation. She was started on pitocin and underwent AROM 2 hours later at 3-4 cm. She progressed to complete dilation over the next 2.5 hours. She pushed for 1.5 hours and delivered a vigorous male infant, AMY position, without complication. Apgars 8/9, weight 3958g. Placenta delivered spontaneously and appeared intact. Second degree laceration repaired in the standard fashion with 3-0 vicryl. .     Med Chowdhury MD FACOG  OB/GYN  2022 4:18 PM         Nancy Carl [[id number]]    Labor Event Times    Labor onset date: 22 Onset time:  9:20 AM   Dilation complete date: 22 Complete time: 11:54 AM   Start pushing date/time: 2022 1206      Labor Length    1st Stage (hrs): 2 (min): 34   2nd Stage (hrs): 1 (min): 26   3rd Stage (hrs): 0 (min): 3      Labor Events     labor?: No   steroids: None  Labor Type: Induction/Cervical ripening  Predominate monitoring during 1st stage: continuous electronic fetal monitoring     Antibiotics received during labor?: No     Rupture date/time: 22 0920   Rupture type: Artificial Rupture of Membranes  Fluid color: Clear  Fluid odor: Normal     Induction: Oxytocin, AROM  Induction date/time:     Cervical ripening date/time:     Indications for induction: Elective     Delivery/Placenta Date and Time    Delivery Date: 22 Delivery Time:  1:20 PM   Placenta Date/Time: 2022  1:23 PM  Oxytocin given at the time of delivery:  "after delivery of baby  Delivering clinician: Mandie Shook MD   Other personnel present at delivery:  Provider Role   Mandie Shook MD Obstetrician         Vaginal Counts     Initial count performed by 2 team members:  Two Team Members   TERENCE Sparks       Needles Suture Needles Sponges (RETIRED) Instruments   Initial counts 1 0 6    Added to count 0 1     Relief counts       Final counts 1 1 6          Placed during labor Accounted for at the end of labor   FSE No NA   IUPC No NA   Cervidil No NA              Final count performed by 2 team members:  Two Team Members   Alana Spear MD      Final count correct?: Yes     Apgars    Living status: Living   1 Minute 5 Minute 10 Minute 15 Minute 20 Minute   Skin color: 1  1       Heart rate: 2  2       Reflex irritability: 2  2       Muscle tone: 1  2       Respiratory effort: 2  2       Total: 8  9       Apgars assigned by: ALANA RUVALCABA RN     Cord    Vessels: 3 Vessels    Cord Complications: None               Cord Blood Disposition: Lab    Gases Sent?: No    Delayed cord clamping?: Yes    Cord Clamping Delay (seconds): 31-60 seconds    Stem cell collection?: No        Resuscitation    Methods: None   Care at Delivery: Infant male born 2022 at 1320 via  by Dr. Mandie Shook. Infant was immediately placed skin to skin with mother. Mother was GBS negative and plans to Breastfeed. Infant ID bands were placed.  transition education completed at this time.      Measurements    Weight: 8 lb 11.6 oz Length: 1' 8.5\"   Head circumference: 35.6 cm Chest circumference: 34.9 cm      Skin to Skin and Feeding Plan    Skin to skin initiation date/time: 1841    Skin to skin with: Mother  Skin to skin end date/time:        Labor Events and Shoulder Dystocia    Fetal Tracing Prior to Delivery: Category 2  Shoulder dystocia present?: Neg     Delivery (Maternal) " (Provider to Complete) (113107)    Episiotomy: None  Perineal lacerations: 2nd Repaired?: Yes   Repair suture: 3-0 Vicryl  Genital tract inspection done: Pos     Blood Loss  Mother: Bebe Carl #[id number]   Start of Mother's Information    Delivery Blood Loss  06/06/22 0920 - 06/06/22 1611    Delivery QBL (mL) Hospital Encounter 504 mL    Postpartum QBL (mL) Hospital Encounter 15 mL    Total  519 mL         End of Mother's Information  Mother: Bebe Carl #[id number]          Delivery - Provider to Complete (315720)    Delivering clinician: Mandie Shook MD  Delivery Type (Choose the 1 that will go to the Birth History): Vaginal, Spontaneous                   Other personnel:  Provider Role   Mandie Shook MD Obstetrician                Placenta    Date/Time: 6/6/2022  1:23 PM  Removal: Spontaneous  Disposition: Hospital disposal           Anesthesia    Method: Epidural  Cervical dilation at placement: 0-3                Presentation and Position    Presentation: Vertex    Position: Right Occiput Anterior                 Mandie Shook MD

## 2022-06-06 NOTE — ANESTHESIA PROCEDURE NOTES
Epidural catheter Procedure Note    Pre-Procedure   Staff -        CRNA: Zachary Beltran APRN CRNA       Performed By: CRNA       Location: OB       Procedure Start/Stop Times: 6/6/2022 8:31 AM and 6/6/2022 9:01 AM       Pre-Anesthestic Checklist: patient identified, IV checked, risks and benefits discussed, informed consent, monitors and equipment checked, pre-op evaluation, at physician/surgeon's request and post-op pain management  Timeout:       Correct Patient: Yes        Correct Procedure: Yes        Correct Site: Yes        Correct Position: Yes   Procedure Documentation  Procedure: epidural catheter       Diagnosis: Labor Pain       Patient Position: sitting       Patient Prep/Sterile Barriers: sterile gloves, patient draped       Skin prep: Chloraprep       Local skin infiltrated with 4 mL of 1% lidocaine.        Insertion Site: L3-4. (midline approach).       Technique: LORT air        JOY at 6 cm.       Needle Type: Touhy needle       Needle Gauge: 17.        Needle Length (Inches): 3.5        Catheter: 19 G.          Catheter threaded easily.           Threaded 14 cm at skin.         # of attempts: 2 and  # of redirects:  0    Assessment/Narrative         Paresthesias: No.       Test dose of 5 mL lidocaine 1.5% w/ 1:200,000 epinephrine at 08:47 CDT.         Test dose negative, 3 minutes after injection, for signs of intravascular, subdural, or intrathecal injection.       Insertion/Infusion Method: LORT air       Aspiration negative for Heme or CSF via Epidural Catheter.    Medication(s) Administered   Medication Administration Time: 6/6/2022 8:31 AM

## 2022-06-07 VITALS
OXYGEN SATURATION: 99 % | HEART RATE: 85 BPM | RESPIRATION RATE: 16 BRPM | DIASTOLIC BLOOD PRESSURE: 59 MMHG | SYSTOLIC BLOOD PRESSURE: 103 MMHG | TEMPERATURE: 97 F

## 2022-06-07 LAB — HGB BLD-MCNC: 8.8 G/DL (ref 11.7–15.7)

## 2022-06-07 PROCEDURE — 36415 COLL VENOUS BLD VENIPUNCTURE: CPT | Performed by: OBSTETRICS & GYNECOLOGY

## 2022-06-07 PROCEDURE — 85018 HEMOGLOBIN: CPT | Performed by: OBSTETRICS & GYNECOLOGY

## 2022-06-07 PROCEDURE — 250N000013 HC RX MED GY IP 250 OP 250 PS 637: Performed by: OBSTETRICS & GYNECOLOGY

## 2022-06-07 RX ADMIN — IBUPROFEN 800 MG: 400 TABLET, FILM COATED ORAL at 14:25

## 2022-06-07 RX ADMIN — DOCUSATE SODIUM 100 MG: 100 CAPSULE, LIQUID FILLED ORAL at 11:00

## 2022-06-07 RX ADMIN — IBUPROFEN 800 MG: 400 TABLET, FILM COATED ORAL at 02:02

## 2022-06-07 RX ADMIN — ACETAMINOPHEN 650 MG: 325 TABLET ORAL at 05:58

## 2022-06-07 ASSESSMENT — ACTIVITIES OF DAILY LIVING (ADL)
ADLS_ACUITY_SCORE: 20

## 2022-06-07 NOTE — ANESTHESIA POSTPROCEDURE EVALUATION
Patient: Bebe Carl    Procedure: * No procedures listed *       Anesthesia Type:  Epidural    Note:  Disposition: Inpatient   Postop Pain Control: Uneventful            Sign Out: Well controlled pain   PONV: No   Neuro/Psych: Uneventful            Sign Out: Acceptable/Baseline neuro status   Airway/Respiratory: Uneventful            Sign Out: Acceptable/Baseline resp. status   CV/Hemodynamics: Uneventful            Sign Out: Acceptable CV status; No obvious hypovolemia; No obvious fluid overload   Other NRE: NONE   DID A NON-ROUTINE EVENT OCCUR? No    Event details/Postop Comments:  Patient was happy with her epidural.  She has no residual numbness or tingling in her lower extremities.  She has voided and reports no fever or chills.            Last vitals:  Vitals:    06/06/22 1518 06/07/22 0210 06/07/22 0730   BP: 93/54 98/65 103/59   Pulse: 94 78 85   Resp: 18 18 16   Temp: 98.1  F (36.7  C) 96.9  F (36.1  C) 97  F (36.1  C)   SpO2: 98%  99%       Electronically Signed By: BONNIE DENNISON CRNA  June 7, 2022  11:19 AM

## 2022-06-07 NOTE — PROGRESS NOTES
" Postpartum Rounding Progress Note    Ms. Carl is PPD #1 today after an uncomplicated . She reports feeling well with no acute concerns at this time. She is eager to go home. Her bleeding has slowed down and she does not endorse any clots. Pain has been well controlled with alternating tylenol and ibuprofen. She has been up and ambulating well, without feeling light-headed or dizzy. Tolerating normal diet, has been able to urinate normally and is passing flatus but has not yet had a bowel movement. No concerns for leg pain or calf pain.  She reports her mood is good. We discussed what to expect as she recovers at home including continued, scant lochia, mood fluctuations and uterine cramping, especially with breastfeeding.       Exam  Vitals:  BP Readings from Last 1 Encounters:   22 103/59     Pulse Readings from Last 1 Encounters:   22 85     Wt Readings from Last 1 Encounters:   22 75.5 kg (166 lb 8 oz)     Ht Readings from Last 1 Encounters:   22 1.549 m (5' 1\")     Estimated body mass index is 31.46 kg/m  as calculated from the following:    Height as of 22: 1.549 m (5' 1\").    Weight as of 22: 75.5 kg (166 lb 8 oz).  Temp Readings from Last 1 Encounters:   22 97  F (36.1  C) (Tympanic)       General: No acute distress, well-appearing  CV: Normal rate, no pallor  Lungs: Non-labored respirations  Abdominal: Uterine fundus is firm, midline and just below umbilicus  Extremities: Normal movement, mild, symmetric bilateral lower extremity swelling, no sign of erythema or asymmetry. Negative Rhoda's bilaterally    Assessment & Plan  Ms. Carl is a 21 y.o.  s/p  at 39w2d following pregnancy complicated by bipolar disorder and SMA carrier.    #PPD 1  --Meeting milestones appropriately  --Lochia bleeding w/o clots  --Pain adequately controlled with PRNs  --Tolerating regular diet and ambulating well      #IWB  --Baby boy at bedside, doing well per mom  --Breast " feeding    #PNL  --Rh+, Rubella immune, GBS neg    #Contraception  --Discussed options with the patient   --Patient has decided on postpartum tubal at 6 weeks, declines anything until then    #Disposition  --Continue routine postpartum care  --Anticipate discharge today  --Follow up in clinic in 6 weeks    ANTOLIN GIVENS MD on 6/7/2022 at 9:36 AM

## 2022-06-07 NOTE — PROGRESS NOTES
[facility] DISCHARGE NOTE    Patient discharged to home at 2:52 PM via ambulation. Accompanied by mother and staff. Discharge instructions reviewed with patient, opportunity offered to ask questions. Prescriptions - None ordered for discharge. All belongings sent with patient.    Jamaica Mcneill RN

## 2022-06-07 NOTE — DISCHARGE SUMMARY
Admit date: 2022  Discharge date: 2022    Admit Dx:   - 21 year old  at 39w2d   - elective induction of labor    Discharge Dx:  - Same as above, s/p     Procedures:  -     Admit HPI:  Ms. Bebe Carl is a   at 39w2d who was admitted for eIOL on 2022. Please see her admit H&P for full details of her PMH, PSH, Meds, Allergies and exam on admit.    Hospital course:  Bebe Carl was admitted to the hospital on 2022 for the above listed indications. She had an uncomplicated  and has been meeting appropriate postpartum milestones for discharge on PPD#1.    Discharge Medications:  No medications were prescribed on discharge    Discharge/Disposition:  Bebe Carl was discharged to home in stable condition     ANTOLIN GIVENS MD on 2022 at 9:39 AM

## 2022-06-12 ENCOUNTER — HOSPITAL ENCOUNTER (EMERGENCY)
Facility: OTHER | Age: 22
Discharge: HOME | End: 2022-06-12
Attending: PHYSICIAN ASSISTANT | Admitting: PHYSICIAN ASSISTANT
Payer: COMMERCIAL

## 2022-06-12 ENCOUNTER — OFFICE VISIT (OUTPATIENT)
Dept: FAMILY MEDICINE | Facility: OTHER | Age: 22
End: 2022-06-12
Attending: STUDENT IN AN ORGANIZED HEALTH CARE EDUCATION/TRAINING PROGRAM
Payer: COMMERCIAL

## 2022-06-12 VITALS
RESPIRATION RATE: 16 BRPM | TEMPERATURE: 97.4 F | WEIGHT: 150 LBS | DIASTOLIC BLOOD PRESSURE: 80 MMHG | SYSTOLIC BLOOD PRESSURE: 122 MMHG | HEIGHT: 61 IN | BODY MASS INDEX: 28.32 KG/M2 | HEART RATE: 99 BPM | OXYGEN SATURATION: 98 %

## 2022-06-12 VITALS
BODY MASS INDEX: 28.36 KG/M2 | RESPIRATION RATE: 16 BRPM | DIASTOLIC BLOOD PRESSURE: 80 MMHG | HEART RATE: 91 BPM | SYSTOLIC BLOOD PRESSURE: 122 MMHG | OXYGEN SATURATION: 99 % | WEIGHT: 150.1 LBS | TEMPERATURE: 97.1 F

## 2022-06-12 DIAGNOSIS — R51.9 POSTPARTUM HEADACHE: ICD-10-CM

## 2022-06-12 DIAGNOSIS — Z87.59 HISTORY OF POSTPARTUM UTERINE BLEEDING: ICD-10-CM

## 2022-06-12 DIAGNOSIS — Z53.9 ERRONEOUS ENCOUNTER--DISREGARD: Primary | ICD-10-CM

## 2022-06-12 DIAGNOSIS — G97.1 HEADACHE AFTER SPINAL PUNCTURE: ICD-10-CM

## 2022-06-12 LAB
ALBUMIN SERPL-MCNC: 3.9 G/DL (ref 3.5–5.7)
ALP SERPL-CCNC: 104 U/L (ref 34–104)
ALT SERPL W P-5'-P-CCNC: 6 U/L (ref 7–52)
ANION GAP SERPL CALCULATED.3IONS-SCNC: 9 MMOL/L (ref 3–14)
AST SERPL W P-5'-P-CCNC: 10 U/L (ref 13–39)
BASOPHILS # BLD AUTO: 0 10E3/UL (ref 0–0.2)
BASOPHILS NFR BLD AUTO: 0 %
BILIRUB SERPL-MCNC: 0.6 MG/DL (ref 0.3–1)
BUN SERPL-MCNC: 7 MG/DL (ref 7–25)
CALCIUM SERPL-MCNC: 8.5 MG/DL (ref 8.6–10.3)
CHLORIDE BLD-SCNC: 103 MMOL/L (ref 98–107)
CO2 SERPL-SCNC: 24 MMOL/L (ref 21–31)
CREAT SERPL-MCNC: 0.64 MG/DL (ref 0.6–1.2)
CRP SERPL-MCNC: 68.1 MG/L
EOSINOPHIL # BLD AUTO: 0.3 10E3/UL (ref 0–0.7)
EOSINOPHIL NFR BLD AUTO: 3 %
ERYTHROCYTE [DISTWIDTH] IN BLOOD BY AUTOMATED COUNT: 14.8 % (ref 10–15)
GFR SERPL CREATININE-BSD FRML MDRD: >90 ML/MIN/1.73M2
GLUCOSE BLD-MCNC: 84 MG/DL (ref 70–105)
HCT VFR BLD AUTO: 35.9 % (ref 35–47)
HGB BLD-MCNC: 11.3 G/DL (ref 11.7–15.7)
IMM GRANULOCYTES # BLD: 0.1 10E3/UL
IMM GRANULOCYTES NFR BLD: 1 %
LYMPHOCYTES # BLD AUTO: 1.6 10E3/UL (ref 0.8–5.3)
LYMPHOCYTES NFR BLD AUTO: 13 %
MAGNESIUM SERPL-MCNC: 2.2 MG/DL (ref 1.9–2.7)
MCH RBC QN AUTO: 26.5 PG (ref 26.5–33)
MCHC RBC AUTO-ENTMCNC: 31.5 G/DL (ref 31.5–36.5)
MCV RBC AUTO: 84 FL (ref 78–100)
MONOCYTES # BLD AUTO: 0.7 10E3/UL (ref 0–1.3)
MONOCYTES NFR BLD AUTO: 5 %
NEUTROPHILS # BLD AUTO: 9.8 10E3/UL (ref 1.6–8.3)
NEUTROPHILS NFR BLD AUTO: 78 %
NRBC # BLD AUTO: 0 10E3/UL
NRBC BLD AUTO-RTO: 0 /100
PLATELET # BLD AUTO: 464 10E3/UL (ref 150–450)
POTASSIUM BLD-SCNC: 4.1 MMOL/L (ref 3.5–5.1)
PROT SERPL-MCNC: 7 G/DL (ref 6.4–8.9)
RBC # BLD AUTO: 4.27 10E6/UL (ref 3.8–5.2)
SODIUM SERPL-SCNC: 136 MMOL/L (ref 134–144)
WBC # BLD AUTO: 12.5 10E3/UL (ref 4–11)

## 2022-06-12 PROCEDURE — 250N000013 HC RX MED GY IP 250 OP 250 PS 637: Performed by: PHYSICIAN ASSISTANT

## 2022-06-12 PROCEDURE — 80053 COMPREHEN METABOLIC PANEL: CPT | Performed by: PHYSICIAN ASSISTANT

## 2022-06-12 PROCEDURE — 86140 C-REACTIVE PROTEIN: CPT | Performed by: PHYSICIAN ASSISTANT

## 2022-06-12 PROCEDURE — 258N000003 HC RX IP 258 OP 636: Performed by: PHYSICIAN ASSISTANT

## 2022-06-12 PROCEDURE — 96360 HYDRATION IV INFUSION INIT: CPT | Performed by: PHYSICIAN ASSISTANT

## 2022-06-12 PROCEDURE — 99283 EMERGENCY DEPT VISIT LOW MDM: CPT | Performed by: PHYSICIAN ASSISTANT

## 2022-06-12 PROCEDURE — 99283 EMERGENCY DEPT VISIT LOW MDM: CPT | Mod: 25 | Performed by: PHYSICIAN ASSISTANT

## 2022-06-12 PROCEDURE — 83735 ASSAY OF MAGNESIUM: CPT | Performed by: PHYSICIAN ASSISTANT

## 2022-06-12 PROCEDURE — 85025 COMPLETE CBC W/AUTO DIFF WBC: CPT | Performed by: PHYSICIAN ASSISTANT

## 2022-06-12 PROCEDURE — 36415 COLL VENOUS BLD VENIPUNCTURE: CPT | Performed by: PHYSICIAN ASSISTANT

## 2022-06-12 PROCEDURE — G0463 HOSPITAL OUTPT CLINIC VISIT: HCPCS

## 2022-06-12 RX ORDER — ACETAMINOPHEN 500 MG
1000 TABLET ORAL ONCE
Status: COMPLETED | OUTPATIENT
Start: 2022-06-12 | End: 2022-06-12

## 2022-06-12 RX ORDER — SODIUM CHLORIDE 9 MG/ML
INJECTION, SOLUTION INTRAVENOUS CONTINUOUS
Status: DISCONTINUED | OUTPATIENT
Start: 2022-06-12 | End: 2022-06-12 | Stop reason: HOSPADM

## 2022-06-12 RX ORDER — ACETAMINOPHEN 500 MG
1000 TABLET ORAL EVERY 6 HOURS PRN
COMMUNITY

## 2022-06-12 RX ADMIN — ACETAMINOPHEN 1000 MG: 500 TABLET ORAL at 17:04

## 2022-06-12 RX ADMIN — SODIUM CHLORIDE 1000 ML: 9 INJECTION, SOLUTION INTRAVENOUS at 17:24

## 2022-06-12 RX ADMIN — SODIUM CHLORIDE 1000 ML: 9 INJECTION, SOLUTION INTRAVENOUS at 15:48

## 2022-06-12 ASSESSMENT — ENCOUNTER SYMPTOMS
ABDOMINAL PAIN: 0
VOMITING: 0
SEIZURES: 0
HEADACHES: 1
AGITATION: 0
NAUSEA: 0
SORE THROAT: 0
FLANK PAIN: 0
DIZZINESS: 1
FACIAL SWELLING: 0
LIGHT-HEADEDNESS: 1
NECK PAIN: 0
TREMORS: 0
STRIDOR: 0
EYE PAIN: 0
FEVER: 0
BACK PAIN: 0

## 2022-06-12 ASSESSMENT — PAIN SCALES - GENERAL: PAINLEVEL: EXTREME PAIN (9)

## 2022-06-12 NOTE — ED PROVIDER NOTES
History     Chief Complaint   Patient presents with     Postpartum Complications     Back Pain     HPI  Bebe Carl is a 21 year old female who is a .  She just delivered her second child 6 days ago.  At that time she received an epidural for pain control.  She reports that since then she has had a dull achy headache that has been persistent.  She also has been having low back pain.  She has had some postpartum vaginal spotting but she describes this as identical to her previous postpartum bleeding with her first child.  She reports she will continue to monitor it and follow-up with her OB/GYN if needed.  Denies any vaginal pain.  No pelvic pain.  No pelvic motion tenderness.  Denies any foul odor or discharge.  Denies any nausea or vomiting but  Some occasional lightheadedness and dizziness.  No visual changes.  No numbness or tingling.  No neck pain.  She is currently breast-feeding so wants to avoid any medications which could interfere with this.  She is here for further evaluation at this time.  She is not appear to be in any distress.    Allergies:  Allergies   Allergen Reactions     Sulfa Drugs Hives and Anaphylaxis     Atomoxetine GI Disturbance     Weight loss     Latex Rash     bandaides only       Problem List:    Patient Active Problem List    Diagnosis Date Noted     Encounter for elective induction of labor 2022     Priority: Medium     Normal labor and delivery 2020     Priority: Medium      (normal spontaneous vaginal delivery) 2020     Priority: Medium     Acute gastroenteritis 2019     Priority: Medium     Dehydration 2019     Priority: Medium      contractions 2019     Priority: Medium     Encounter for triage in pregnant patient 10/01/2019     Priority: Medium     ADHD 2018     Priority: Medium     Appendicitis with abscess 2017     Priority: Medium     Mononucleosis 2017     Priority: Medium     Ruptured suppurative  appendicitis 01/01/2017     Priority: Medium     Controlled substance agreement signed 03/24/2016     Priority: Medium        Past Medical History:    Past Medical History:   Diagnosis Date     Acute gastroenteritis 12/24/2019     ADHD (attention deficit hyperactivity disorder)      Anemia      Bipolar 1 disorder (H)      Depression      Depressive disorder      History of blood transfusion      Postpartum depression      Viral gastroenteritis 12/24/2019       Past Surgical History:    Past Surgical History:   Procedure Laterality Date     APPENDECTOMY         Family History:    Family History   Problem Relation Age of Onset     Bipolar Disorder Mother      Attention Deficit Disorder Father      Thyroid Disease Father      Hyperthyroidism Sister      No Known Problems Brother      Brain Tumor Maternal Grandmother      Cancer Maternal Grandfather      Heart Disease Paternal Grandfather      Cancer Paternal Grandfather        Social History:  Marital Status:  Single [1]  Social History     Tobacco Use     Smoking status: Former Smoker     Packs/day: 1.00     Smokeless tobacco: Never Used   Vaping Use     Vaping Use: Former   Substance Use Topics     Alcohol use: No     Drug use: No        Medications:    acetaminophen (TYLENOL) 500 MG tablet  ferrous sulfate (SLO-FE) 142 (45 Fe) MG CR tablet  Prenatal Vit-Fe Fumarate-FA (PRENATAL MULTIVITAMIN W/IRON) 27-0.8 MG tablet  sertraline (ZOLOFT) 25 MG tablet          Review of Systems   Constitutional: Negative for fever.   HENT: Negative for facial swelling and sore throat.    Eyes: Negative for pain.   Respiratory: Negative for stridor.    Cardiovascular: Negative for chest pain.   Gastrointestinal: Negative for abdominal pain, nausea and vomiting.   Genitourinary: Positive for vaginal bleeding. Negative for flank pain, pelvic pain and vaginal pain.   Musculoskeletal: Negative for back pain and neck pain.   Skin: Negative for pallor.   Neurological: Positive for dizziness,  "light-headedness and headaches. Negative for tremors and seizures.   Psychiatric/Behavioral: Negative for agitation.   All other systems reviewed and are negative.      Physical Exam   BP: 122/80  Pulse: 99  Temp: 97.4  F (36.3  C)  Resp: 16  Height: 155.6 cm (5' 1.25\")  Weight: 68 kg (150 lb)      Physical Exam  Vitals and nursing note reviewed.   Constitutional:       General: She is not in acute distress.     Appearance: Normal appearance. She is not ill-appearing or toxic-appearing.   HENT:      Head: Normocephalic. No raccoon eyes, right periorbital erythema or left periorbital erythema.      Right Ear: No drainage or tenderness.      Left Ear: No drainage or tenderness.      Nose: Nose normal.   Eyes:      General: Lids are normal. Gaze aligned appropriately. No scleral icterus.     Extraocular Movements: Extraocular movements intact.   Neck:      Trachea: No tracheal deviation.   Cardiovascular:      Rate and Rhythm: Normal rate.   Pulmonary:      Effort: Pulmonary effort is normal. No respiratory distress.      Breath sounds: No stridor.   Abdominal:      Tenderness: There is no abdominal tenderness.   Musculoskeletal:         General: No deformity or signs of injury. Normal range of motion.      Cervical back: Normal range of motion. No signs of trauma.   Skin:     General: Skin is warm and dry.      Coloration: Skin is not jaundiced or pale.   Neurological:      General: No focal deficit present.      Mental Status: She is alert and oriented to person, place, and time.      GCS: GCS eye subscore is 4. GCS verbal subscore is 5. GCS motor subscore is 6.      Motor: No tremor or seizure activity.   Psychiatric:         Attention and Perception: Attention normal.         Mood and Affect: Mood normal.         ED Course     Results for orders placed or performed during the hospital encounter of 06/12/22 (from the past 24 hour(s))   CBC with platelets differential    Narrative    The following orders were created " for panel order CBC with platelets differential.  Procedure                               Abnormality         Status                     ---------                               -----------         ------                     CBC with platelets and d...[[id number]]  Abnormal            Final result                 Please view results for these tests on the individual orders.   Comprehensive metabolic panel   Result Value Ref Range    Sodium 136 134 - 144 mmol/L    Potassium 4.1 3.5 - 5.1 mmol/L    Chloride 103 98 - 107 mmol/L    Carbon Dioxide (CO2) 24 21 - 31 mmol/L    Anion Gap 9 3 - 14 mmol/L    Urea Nitrogen 7 7 - 25 mg/dL    Creatinine 0.64 0.60 - 1.20 mg/dL    Calcium 8.5 (L) 8.6 - 10.3 mg/dL    Glucose 84 70 - 105 mg/dL    Alkaline Phosphatase 104 34 - 104 U/L    AST 10 (L) 13 - 39 U/L    ALT 6 (L) 7 - 52 U/L    Protein Total 7.0 6.4 - 8.9 g/dL    Albumin 3.9 3.5 - 5.7 g/dL    Bilirubin Total 0.6 0.3 - 1.0 mg/dL    GFR Estimate >90 >60 mL/min/1.73m2   Magnesium   Result Value Ref Range    Magnesium 2.2 1.9 - 2.7 mg/dL   CRP inflammation   Result Value Ref Range    CRP Inflammation 68.1 (H) <10.0 mg/L   CBC with platelets and differential   Result Value Ref Range    WBC Count 12.5 (H) 4.0 - 11.0 10e3/uL    RBC Count 4.27 3.80 - 5.20 10e6/uL    Hemoglobin 11.3 (L) 11.7 - 15.7 g/dL    Hematocrit 35.9 35.0 - 47.0 %    MCV 84 78 - 100 fL    MCH 26.5 26.5 - 33.0 pg    MCHC 31.5 31.5 - 36.5 g/dL    RDW 14.8 10.0 - 15.0 %    Platelet Count 464 (H) 150 - 450 10e3/uL    % Neutrophils 78 %    % Lymphocytes 13 %    % Monocytes 5 %    % Eosinophils 3 %    % Basophils 0 %    % Immature Granulocytes 1 %    NRBCs per 100 WBC 0 <1 /100    Absolute Neutrophils 9.8 (H) 1.6 - 8.3 10e3/uL    Absolute Lymphocytes 1.6 0.8 - 5.3 10e3/uL    Absolute Monocytes 0.7 0.0 - 1.3 10e3/uL    Absolute Eosinophils 0.3 0.0 - 0.7 10e3/uL    Absolute Basophils 0.0 0.0 - 0.2 10e3/uL    Absolute Immature Granulocytes 0.1 <=0.4 10e3/uL    Absolute  NRBCs 0.0 10e3/uL       Medications   0.9% sodium chloride BOLUS (0 mLs Intravenous Stopped 6/12/22 1659)     Followed by   0.9% sodium chloride BOLUS (0 mLs Intravenous Stopped 6/12/22 1750)     Followed by   sodium chloride 0.9% infusion (has no administration in time range)   acetaminophen (TYLENOL) tablet 1,000 mg (1,000 mg Oral Given 6/12/22 1704)       Assessments & Plan (with Medical Decision Making)     I have reviewed the nursing notes.    I have reviewed the findings, diagnosis, plan and need for follow up with the patient.      New Prescriptions    No medications on file       Final diagnoses:   Headache after spinal puncture   Postpartum headache   Postpartum hemorrhage, unspecified type   History of postpartum uterine bleeding     Afebrile.  Vital signs stable.  Blood pressure at 122/80 with a heart rate of 99.  Patient  reports she is mainly here due to a persistent headache that she has had ever since she received a spinal 6 days ago for the vaginal delivery of her second child.  She denies any nausea or severe headache.  Described as just a constant dull headache.  She has had some postpartum vaginal spotting but she describes the bleeding as  normal for her and she had this after her first delivery as well.  Denies any pelvic pain or pelvic wall motion tenderness.  Vaginal exam was offered but she declined the need for this.  She reports that she will follow-up with her OB/GYN if the bleeding continues for additional evaluation and this seems reasonable.   IV was established and she was given fluids and Tylenol.  Her CBC shows only minimal elevation her white blood cells at 12.5.  Her hemoglobin is actually increased to 11.3, up from 5 days ago when it was 8.8.  And her platelets are increased at 464.  Her CMP is unremarkable CRP is elevated at 68.1 no previous for comparison.  She did not produce a UA for evaluation.  She reports feeling much better with the above treatment.  Her headache is gone  at this time.  She feels comfortable returning home.  I discussed continued monitoring and return if there is any concerns for further evaluation as needed.  Continue to monitor her postpartum bleeding as well and return if symptoms persist for immediate evaluation.  I explained my diagnostic considerations and recommendations and the patient voiced an understanding and was in agreement with the treatment plan. All questions were answered to the best of my ability.  We discussed potential side effects of any prescribed or recommended therapies, as well as expectations for response to treatments.      6/12/2022   [facility]     [facility]New [facility] Treatment CenterRiver martin PA-C  06/12/22 [id number]

## 2022-06-12 NOTE — ED TRIAGE NOTES
Patient presents to ER for concerns of passing multiple golf size ball clots after having her baby 7 days ago. She states that she feels lightheaded and dizzy. She had a normal vaginal delivery without complications. This is her second baby, she reports having to have a blood transfusion for postpartum hemorrhage with her first baby. She denies fevers, shortness of breath or chest pain, She reports having pain in her back in the area she had the epidural rating 8-9/10 which she has been taking Tylenol for.

## 2022-06-12 NOTE — PROGRESS NOTES
Due to acuity of patient's symptoms and concerns patient was transferred immediately to the emergency room for further assessment/treatment.  Lizzette Doran PA-C.......... 6/12/2022 4:23 PM      This encounter was opened in error. Please disregard.

## 2022-06-12 NOTE — NURSING NOTE
"Chief Complaint   Patient presents with     Headache     Migraines, tingling in hands, feet; had baby 6 days ago     Patient was discharged to home on Tuesday, 6/7/22 after giving birth on 6/6/22, where she had an epidural.   Patient started getting pain in spine of lower back, migraines and tingling in hands and feet, feels like she's going to faint when standing, but also feels dizzy and lightheadedness, blurry vision and spinning feeling when sitting.  Patient states that she also having a lot of clotting, which is heavier than with first baby.      Initial /80 (BP Location: Left arm, Patient Position: Sitting, Cuff Size: Adult Regular)   Pulse 91   Temp 97.1  F (36.2  C) (Temporal)   Resp 16   Wt 68.1 kg (150 lb 1.6 oz)   LMP 09/04/2021 (Exact Date)   SpO2 99%   Breastfeeding Yes   BMI 28.36 kg/m   Estimated body mass index is 28.36 kg/m  as calculated from the following:    Height as of 5/25/22: 1.549 m (5' 1\").    Weight as of this encounter: 68.1 kg (150 lb 1.6 oz).     Medication Reconciliation: complete      FOOD SECURITY SCREENING QUESTIONS:    The next two questions are to help us understand your food security.  If you are feeling you need any assistance in this area, we have resources available to support you today.    Hunger Vital Signs:  Within the past 12 months we worried whether our food would run out before we got money to buy more. Never  Within the past 12 months the food we bought just didn't last and we didn't have money to get more. Never      Advance care plan reviewed      Claudia Pat LPN on 6/12/2022 at 3:08 PM      "

## 2022-06-13 ENCOUNTER — TELEPHONE (OUTPATIENT)
Dept: OBGYN | Facility: OTHER | Age: 22
End: 2022-06-13
Payer: COMMERCIAL

## 2022-06-13 NOTE — TELEPHONE ENCOUNTER
Call received from patient stating that she has a headache and some tingling in her hands and feet, had NVD on 6/6/22, was seen in ER for this yesterday and received fluids and tylenol and felt better. No known fever, no vaginal bleeding concerns or abdominal/vaginal pain. Advised patient to hydrate and rest and if she is wanting to be seen to present to rapid clinic and if severe to the the ER, continue with ibuprofen and tylenol as advised by ER MD. Patient voiced understanding and had no further questions or concerns at this time.   Germaine Shook RN on 6/13/2022 at 3:44 PM

## 2022-06-15 ENCOUNTER — HOSPITAL ENCOUNTER (EMERGENCY)
Facility: OTHER | Age: 22
Discharge: HOME | End: 2022-06-15
Attending: PHYSICIAN ASSISTANT | Admitting: PHYSICIAN ASSISTANT
Payer: COMMERCIAL

## 2022-06-15 ENCOUNTER — APPOINTMENT (OUTPATIENT)
Dept: CT IMAGING | Facility: OTHER | Age: 22
End: 2022-06-15
Attending: PHYSICIAN ASSISTANT
Payer: COMMERCIAL

## 2022-06-15 VITALS
TEMPERATURE: 97.7 F | SYSTOLIC BLOOD PRESSURE: 104 MMHG | BODY MASS INDEX: 28.32 KG/M2 | RESPIRATION RATE: 18 BRPM | OXYGEN SATURATION: 98 % | WEIGHT: 150 LBS | HEIGHT: 61 IN | HEART RATE: 98 BPM | DIASTOLIC BLOOD PRESSURE: 69 MMHG

## 2022-06-15 DIAGNOSIS — R51.9 NONINTRACTABLE HEADACHE, UNSPECIFIED CHRONICITY PATTERN, UNSPECIFIED HEADACHE TYPE: ICD-10-CM

## 2022-06-15 DIAGNOSIS — Z91.199 NON-COMPLIANCE WITH TREATMENT: ICD-10-CM

## 2022-06-15 LAB
ALBUMIN SERPL-MCNC: 3.8 G/DL (ref 3.5–5.7)
ALP SERPL-CCNC: 92 U/L (ref 34–104)
ALT SERPL W P-5'-P-CCNC: 6 U/L (ref 7–52)
ANION GAP SERPL CALCULATED.3IONS-SCNC: 10 MMOL/L (ref 3–14)
AST SERPL W P-5'-P-CCNC: 11 U/L (ref 13–39)
BASOPHILS # BLD AUTO: 0.1 10E3/UL (ref 0–0.2)
BASOPHILS NFR BLD AUTO: 1 %
BILIRUB DIRECT SERPL-MCNC: 0.1 MG/DL (ref 0–0.2)
BILIRUB SERPL-MCNC: 0.5 MG/DL (ref 0.3–1)
BUN SERPL-MCNC: 6 MG/DL (ref 7–25)
CALCIUM SERPL-MCNC: 8.2 MG/DL (ref 8.6–10.3)
CHLORIDE BLD-SCNC: 106 MMOL/L (ref 98–107)
CO2 SERPL-SCNC: 24 MMOL/L (ref 21–31)
CREAT SERPL-MCNC: 0.74 MG/DL (ref 0.6–1.2)
EOSINOPHIL # BLD AUTO: 0.2 10E3/UL (ref 0–0.7)
EOSINOPHIL NFR BLD AUTO: 2 %
ERYTHROCYTE [DISTWIDTH] IN BLOOD BY AUTOMATED COUNT: 14.8 % (ref 10–15)
GFR SERPL CREATININE-BSD FRML MDRD: >90 ML/MIN/1.73M2
GLUCOSE BLD-MCNC: 81 MG/DL (ref 70–105)
HCT VFR BLD AUTO: 35 % (ref 35–47)
HGB BLD-MCNC: 11.2 G/DL (ref 11.7–15.7)
IMM GRANULOCYTES # BLD: 0.1 10E3/UL
IMM GRANULOCYTES NFR BLD: 1 %
LYMPHOCYTES # BLD AUTO: 2.4 10E3/UL (ref 0.8–5.3)
LYMPHOCYTES NFR BLD AUTO: 23 %
MAGNESIUM SERPL-MCNC: 2.5 MG/DL (ref 1.9–2.7)
MCH RBC QN AUTO: 26.9 PG (ref 26.5–33)
MCHC RBC AUTO-ENTMCNC: 32 G/DL (ref 31.5–36.5)
MCV RBC AUTO: 84 FL (ref 78–100)
MONOCYTES # BLD AUTO: 0.8 10E3/UL (ref 0–1.3)
MONOCYTES NFR BLD AUTO: 8 %
NEUTROPHILS # BLD AUTO: 7 10E3/UL (ref 1.6–8.3)
NEUTROPHILS NFR BLD AUTO: 65 %
NRBC # BLD AUTO: 0 10E3/UL
NRBC BLD AUTO-RTO: 0 /100
PLATELET # BLD AUTO: 483 10E3/UL (ref 150–450)
POTASSIUM BLD-SCNC: 4.3 MMOL/L (ref 3.5–5.1)
PROT SERPL-MCNC: 6.9 G/DL (ref 6.4–8.9)
RBC # BLD AUTO: 4.16 10E6/UL (ref 3.8–5.2)
SODIUM SERPL-SCNC: 140 MMOL/L (ref 134–144)
WBC # BLD AUTO: 10.6 10E3/UL (ref 4–11)

## 2022-06-15 PROCEDURE — 99283 EMERGENCY DEPT VISIT LOW MDM: CPT | Performed by: PHYSICIAN ASSISTANT

## 2022-06-15 PROCEDURE — 250N000011 HC RX IP 250 OP 636: Performed by: PHYSICIAN ASSISTANT

## 2022-06-15 PROCEDURE — 258N000003 HC RX IP 258 OP 636: Performed by: PHYSICIAN ASSISTANT

## 2022-06-15 PROCEDURE — 80053 COMPREHEN METABOLIC PANEL: CPT | Performed by: PHYSICIAN ASSISTANT

## 2022-06-15 PROCEDURE — 82248 BILIRUBIN DIRECT: CPT | Performed by: PHYSICIAN ASSISTANT

## 2022-06-15 PROCEDURE — 99285 EMERGENCY DEPT VISIT HI MDM: CPT | Mod: 25 | Performed by: PHYSICIAN ASSISTANT

## 2022-06-15 PROCEDURE — 70498 CT ANGIOGRAPHY NECK: CPT

## 2022-06-15 PROCEDURE — 85049 AUTOMATED PLATELET COUNT: CPT | Performed by: PHYSICIAN ASSISTANT

## 2022-06-15 PROCEDURE — 70496 CT ANGIOGRAPHY HEAD: CPT

## 2022-06-15 PROCEDURE — 36415 COLL VENOUS BLD VENIPUNCTURE: CPT | Performed by: PHYSICIAN ASSISTANT

## 2022-06-15 PROCEDURE — 83735 ASSAY OF MAGNESIUM: CPT | Performed by: PHYSICIAN ASSISTANT

## 2022-06-15 PROCEDURE — 85014 HEMATOCRIT: CPT | Performed by: PHYSICIAN ASSISTANT

## 2022-06-15 RX ORDER — IOPAMIDOL 755 MG/ML
100 INJECTION, SOLUTION INTRAVASCULAR ONCE
Status: COMPLETED | OUTPATIENT
Start: 2022-06-15 | End: 2022-06-15

## 2022-06-15 RX ORDER — SODIUM CHLORIDE 9 MG/ML
INJECTION, SOLUTION INTRAVENOUS CONTINUOUS
Status: DISCONTINUED | OUTPATIENT
Start: 2022-06-15 | End: 2022-06-15 | Stop reason: HOSPADM

## 2022-06-15 RX ADMIN — IOPAMIDOL 70 ML: 755 INJECTION, SOLUTION INTRAVENOUS at 17:26

## 2022-06-15 RX ADMIN — SODIUM CHLORIDE: 9 INJECTION, SOLUTION INTRAVENOUS at 16:08

## 2022-06-15 NOTE — ED PROVIDER NOTES
History     Chief Complaint   Patient presents with     Headache     Numbness     Tingling     HPI  Bebe Carl is a 21 year old female who presents to the emergency department afebrile and nontoxic she is not hypertensive.  Is minus and she is postpartum from 2022 where she had an epidural she states that since then she has had intermittent headaches and her extremities have been intermittently tingly.  Currently she does not have any numbness or tingling she does have a mild headache 1-2 out of 10 has not taken any Tylenol or ibuprofen for the headache.  She is afebrile and she is nontoxic she does not have any visual disturbances no runny nose nasal congestion sinus pressure sore throat or cough no neck pain or stiffness chest pain shortness of breath no abdominal pain no vaginal bleeding or discharge out of the norm skin no rashes trauma she is ambulatory with a steady gait and she is able to do her daily activities and care for her children at home.    Allergies:  Allergies   Allergen Reactions     Sulfa Drugs Hives and Anaphylaxis     Atomoxetine GI Disturbance     Weight loss     Latex Rash     bandaides only       Problem List:    Patient Active Problem List    Diagnosis Date Noted     Encounter for elective induction of labor 2022     Priority: Medium     Normal labor and delivery 2020     Priority: Medium      (normal spontaneous vaginal delivery) 2020     Priority: Medium     Acute gastroenteritis 2019     Priority: Medium     Dehydration 2019     Priority: Medium      contractions 2019     Priority: Medium     Encounter for triage in pregnant patient 10/01/2019     Priority: Medium     ADHD 2018     Priority: Medium     Appendicitis with abscess 2017     Priority: Medium     Mononucleosis 2017     Priority: Medium     Ruptured suppurative appendicitis 2017     Priority: Medium     Controlled substance agreement signed  "03/24/2016     Priority: Medium        Past Medical History:    Past Medical History:   Diagnosis Date     Acute gastroenteritis 12/24/2019     ADHD (attention deficit hyperactivity disorder)      Anemia      Bipolar 1 disorder (H)      Depression      Depressive disorder      History of blood transfusion      Postpartum depression      Viral gastroenteritis 12/24/2019       Past Surgical History:    Past Surgical History:   Procedure Laterality Date     APPENDECTOMY         Family History:    Family History   Problem Relation Age of Onset     Bipolar Disorder Mother      Attention Deficit Disorder Father      Thyroid Disease Father      Hyperthyroidism Sister      No Known Problems Brother      Brain Tumor Maternal Grandmother      Cancer Maternal Grandfather      Heart Disease Paternal Grandfather      Cancer Paternal Grandfather        Social History:  Marital Status:  Single [1]  Social History     Tobacco Use     Smoking status: Former Smoker     Packs/day: 1.00     Smokeless tobacco: Never Used   Vaping Use     Vaping Use: Former   Substance Use Topics     Alcohol use: No     Drug use: No        Medications:    acetaminophen (TYLENOL) 500 MG tablet  ferrous sulfate (SLO-FE) 142 (45 Fe) MG CR tablet  Prenatal Vit-Fe Fumarate-FA (PRENATAL MULTIVITAMIN W/IRON) 27-0.8 MG tablet  sertraline (ZOLOFT) 25 MG tablet          Review of Systems   Please see HPI for pertinent positives and negatives.  All other systems reviewed and found to be negative.      Physical Exam   BP: 103/64  Pulse: 98  Temp: 97.7  F (36.5  C)  Resp: 16  Height: 155.6 cm (5' 1.25\")  Weight: 68 kg (150 lb)  SpO2: 99 %      Physical Exam  Exam:  Constitutional: healthy, alert and no distress  Head: Normocephalic.    Neck: Neck supple. No adenopathy. Thyroid symmetric, normal size,,    ENT: ENT exam normal, no neck nodes or sinus tenderness  Cardiovascular:  RRR. No murmurs, clicks gallops or rub  Respiratory:   Lungs clear  Gastrointestinal: " Abdomen soft, non-tender. BS normal. No masses, organomegaly  : Deferred  Musculoskeletal: extremities normal- no gross deformities noted, gait normal and normal muscle tone  Skin: no suspicious lesions or rashes  Neurologic: Gait normal. Reflexes normal and symmetric and 2+ DTRs distally. Sensation grossly WNL.  National [employer] Stroke Scale  Exam Interval: Baseline   Score    Level of consciousness: (0)   Alert, keenly responsive    LOC questions: (0)   Answers both questions correctly    LOC commands: (0)   Performs both tasks correctly    Best gaze: (0)   Normal    Visual: (0)   No visual loss    Facial palsy: (0)   Normal symmetrical movements    Motor arm (left): (0)   No drift    Motor arm (right): (0)   No drift    Motor leg (left): (0)   No drift    Motor leg (right): (0)   No drift    Limb ataxia: (0)   Absent    Sensory: (0)   Normal- no sensory loss    Best language: (0)   Normal- no aphasia    Dysarthria: (0)   Normal    Extinction and inattention: (0)   No abnormality        Total Score:  0   Romberg negative there is no ataxia.  Psychiatric: mentation appears normal and affect normal/bright       ED Course                 Procedures                 Results for orders placed or performed during the hospital encounter of 06/15/22 (from the past 24 hour(s))   CBC with platelets differential    Narrative    The following orders were created for panel order CBC with platelets differential.  Procedure                               Abnormality         Status                     ---------                               -----------         ------                     CBC with platelets and d...[[id number]]  Abnormal            Final result                 Please view results for these tests on the individual orders.   Basic metabolic panel   Result Value Ref Range    Sodium 140 134 - 144 mmol/L    Potassium 4.3 3.5 - 5.1 mmol/L    Chloride 106 98 - 107 mmol/L    Carbon Dioxide (CO2) 24 21 - 31  mmol/L    Anion Gap 10 3 - 14 mmol/L    Urea Nitrogen 6 (L) 7 - 25 mg/dL    Creatinine 0.74 0.60 - 1.20 mg/dL    Calcium 8.2 (L) 8.6 - 10.3 mg/dL    Glucose 81 70 - 105 mg/dL    GFR Estimate >90 >60 mL/min/1.73m2   Magnesium   Result Value Ref Range    Magnesium 2.5 1.9 - 2.7 mg/dL   CBC with platelets and differential   Result Value Ref Range    WBC Count 10.6 4.0 - 11.0 10e3/uL    RBC Count 4.16 3.80 - 5.20 10e6/uL    Hemoglobin 11.2 (L) 11.7 - 15.7 g/dL    Hematocrit 35.0 35.0 - 47.0 %    MCV 84 78 - 100 fL    MCH 26.9 26.5 - 33.0 pg    MCHC 32.0 31.5 - 36.5 g/dL    RDW 14.8 10.0 - 15.0 %    Platelet Count 483 (H) 150 - 450 10e3/uL    % Neutrophils 65 %    % Lymphocytes 23 %    % Monocytes 8 %    % Eosinophils 2 %    % Basophils 1 %    % Immature Granulocytes 1 %    NRBCs per 100 WBC 0 <1 /100    Absolute Neutrophils 7.0 1.6 - 8.3 10e3/uL    Absolute Lymphocytes 2.4 0.8 - 5.3 10e3/uL    Absolute Monocytes 0.8 0.0 - 1.3 10e3/uL    Absolute Eosinophils 0.2 0.0 - 0.7 10e3/uL    Absolute Basophils 0.1 0.0 - 0.2 10e3/uL    Absolute Immature Granulocytes 0.1 <=0.4 10e3/uL    Absolute NRBCs 0.0 10e3/uL   Hepatic panel   Result Value Ref Range    Bilirubin Total 0.5 0.3 - 1.0 mg/dL    Bilirubin Direct 0.1 0.0 - 0.2 mg/dL    Protein Total 6.9 6.4 - 8.9 g/dL    Albumin 3.8 3.5 - 5.7 g/dL    Alkaline Phosphatase 92 34 - 104 U/L    AST 11 (L) 13 - 39 U/L    ALT 6 (L) 7 - 52 U/L   CTV Head Neck w Contrast    Narrative    CTV HEAD NECK W CONTRAST    HISTORY: 21 years Female    who?presents to the emergency department  afebrile and nontoxic she is not hypertensive. ?Is minus and she is  postpartum from June 6, 2022 where she had an epidural she states that  since then she has had intermittent headaches and her extremities have  been intermittently tingly. ?Currently she does not have any numbness  or tingling she does have a mild headache     COMPARISON: None    TECHNIQUE: Contrast-enhanced CT venography of the neck and head  was  performed with intervenous contrast. Multiplanar reconstructions and  MIP, volume rendered and 3-D MIP reconstructions were obtained on a  3-D workstation..    FINDINGS:      Right neck:            Brachiocephalic artery: patent            Common carotid artery:  patent            Internal carotid artery:Widely patent            Vertebral artery:Widely patent.    The internal jugular vein is widely patent.                Left neck:            Common carotid artery: Widely patent            Internal carotid artery:Widely patent            Vertebral artery:Widely patent.    The internal jugular vein is patent.      Head:           Anatomy:Anatomy of the [place] is normal           Anterior circulation:The vessels are patent without evidence  of abrupt cut off. There is no evidence of aneurysm.           Posterior circulation:The vessels are patent. There is no  abrupt cut off. There is no evidence of aneurysm    The dural sinuses are patent. There is no evidence of venous  thrombosis.      Impression    IMPRESSION:No acute findings. There is no evidence of significant  carotid artery stenosis. There is no evidence of abrupt vessel cut off  or intracranial aneurysm. There is no evidence of dural sinus  thrombosis.    ANGELICA LANGE MD         SYSTEM ID:  [id number]       Medications   sodium chloride 0.9% infusion (0 mLs Intravenous Stopped 6/15/22 1714)   iopamidol (ISOVUE-370) solution 100 mL (70 mLs Intravenous Given 6/15/22 1726)       Assessments & Plan (with Medical Decision Making)     I have reviewed the nursing notes.    I have reviewed the findings, diagnosis, plan and need for follow up with the patient.  Differential diagnosis at this point includes: Eclampsia, preeclampsia, help syndrome, sinus venous thrombosis, appendicitis, aortic aneurysm, mesenteric ischemia, bowel perforation, bowel obstruction, inflammatory bowel diseases, cholecystitis, pancreatitis, hepatitis, gastritis, GERD,  diverticulitis, PUD, pyelonephritis/UTI, renal colic/stone, GC/chlamydia, PID, cervicitis, endometritis, IUP, ectopic pregnancy, dysfunctional uterine bleeding, ovarian cyst/torsion, spontaneous , AAA, as well as other etiologies.  Pleasant female who presents to the emergency department for evaluation she is postpartum from 2022.  She has had a headache she had an epidural she complained of intermittent numbness and tingling to the upper and lower extremities which is now resolved but it is intermittent.  She has been afebrile and she is nontoxic.  She says she complained of a little bit of lightheadedness.  Given that finding she underwent CT venogram rule out dural sinus thrombus no dural sinus thrombus identified.  Given that I do not believe that she is having help syndrome.  She did not leave a urine it was recommended that she stay for further evaluation and completing the course in the emergency department but she says she had to go despite the recommendations.  And she walked out of the emergency department.  I explained my diagnostic considerations and recommendations and the patient voiced an understanding and was in agreement with the treatment plan. All questions were answered. We discussed potential side effects of any prescribed or recommended therapies, as well as expectations for response to treatments.      Discharge Medication List as of 6/15/2022  5:28 PM          Final diagnoses:   Nonintractable headache, unspecified chronicity pattern, unspecified headache type   Non-compliance with treatment       6/15/2022   [facility] AND [facility]     Zay Resendez PA-C  06/15/22 182

## 2022-06-15 NOTE — DISCHARGE INSTRUCTIONS
It was recommended that you stay to complete your course however you have declined to stay in the emergency department waiting for your results.  I understand you are feeling better continue to go home but the recommendation will be to stay till the course is complete surely if symptoms worsen if there is further concerns I would encourage her to return the emergency department.    FOLLOW UP WITH YOUR DOCTOR IN THE NEXT 24 HOURS FOR A RECHECK  RETURN TO THE ER IF SYMPTOMS WORSEN OR IF YOU HAVE FURTHER CONCERNS   TAKE ALL PREVIOUS AND ANY NEW MEDS AS PRESCRIBED       THE DISCHARGE INSTRUCTIONS ARE INTENDED AS A COMPLEMENT TO AND NOT A REPLACEMENT FOR THE VERBAL INSTRUCTIONS THAT I HAVE PROVIDED YOU TONIGHT. AFTER GOING OVER THE PLAN OF CARE TONIGHT, INCLUDING SIDE EFFECTS, ADVERSE REACTIONS OF ALL MEDICATIONS PRESCRIBED (THIS WILL BE PROVIDED TO YOU AT THE PHARMACY ALSO) AND PROVIDING YOU WITH THE VERBAL INSTRUCTIONS AT DISCHARGE YOU HAVE HAD THE OPPORTUNITY TO ASK FURTHER QUESTIONS AND TO CLARIFY UNCERTAINTIES. SINCE YOU HAVE NO FURTHER QUESTIONS PLEASE HAVE A WONDERFUL SAFE EVENING THANK YOU.         WORK/SCHOOL NOTE;    PLEASE EXCUSE THE ABOVE PATIENT FROM EITHER WORK OR SCHOOL FOR THE DAY/NIGHT.

## 2022-06-15 NOTE — ED TRIAGE NOTES
Pt states that she recently had a baby on 6/6/22 and had an epidural.  Pt states she has since had a headache and extremities tingling from the spinal.  Pt was seen 6/12/22 for the same thing and was given IV fluid which did not help.  Pt states the pain and N/T is not getting worse, just not getting better.       Triage Assessment     Row Name 06/15/22 9545       Triage Assessment (Adult)    Airway WDL WDL       Respiratory WDL    Respiratory WDL WDL       Skin Circulation/Temperature WDL    Skin Circulation/Temperature WDL WDL       Cardiac WDL    Cardiac WDL WDL       Peripheral/Neurovascular WDL    Peripheral Neurovascular WDL WDL       Cognitive/Neuro/Behavioral WDL    Cognitive/Neuro/Behavioral WDL WDL

## 2022-06-29 ENCOUNTER — OFFICE VISIT (OUTPATIENT)
Dept: OBGYN | Facility: OTHER | Age: 22
End: 2022-06-29
Attending: OBSTETRICS & GYNECOLOGY
Payer: COMMERCIAL

## 2022-06-29 ENCOUNTER — MEDICAL CORRESPONDENCE (OUTPATIENT)
Dept: HEALTH INFORMATION MANAGEMENT | Facility: OTHER | Age: 22
End: 2022-06-29

## 2022-06-29 VITALS
DIASTOLIC BLOOD PRESSURE: 60 MMHG | HEART RATE: 64 BPM | WEIGHT: 147 LBS | BODY MASS INDEX: 27.55 KG/M2 | SYSTOLIC BLOOD PRESSURE: 108 MMHG

## 2022-06-29 DIAGNOSIS — Z30.09 STERILIZATION CONSULT: Primary | ICD-10-CM

## 2022-06-29 PROCEDURE — 99213 OFFICE O/P EST LOW 20 MIN: CPT | Performed by: OBSTETRICS & GYNECOLOGY

## 2022-06-29 PROCEDURE — G0463 HOSPITAL OUTPT CLINIC VISIT: HCPCS

## 2022-06-29 RX ORDER — ACETAMINOPHEN 325 MG/1
975 TABLET ORAL ONCE
Status: CANCELLED | OUTPATIENT
Start: 2022-06-29 | End: 2022-06-29

## 2022-06-29 ASSESSMENT — PAIN SCALES - GENERAL: PAINLEVEL: NO PAIN (0)

## 2022-06-29 NOTE — PROGRESS NOTES
Surgical H&P    Chief Complaint/Reason for procedure: desire for sterilization    HPI:    Bebe Carl is a 21 year old , here for the above concern. She is s/p  on 22. She is 100% certain she has completed childbearing and desires sterilization.       Past medical history:  Past Medical History:   Diagnosis Date     Acute gastroenteritis 2019     ADHD (attention deficit hyperactivity disorder)      Anemia     following delivery      Bipolar 1 disorder (H)      Depression      Depressive disorder      History of blood transfusion      Postpartum depression      Viral gastroenteritis 2019         Ms. Carl's general medical condition is able to tolerate the procedure and anesthesia planned.     Past Surgical History:  Past Surgical History:   Procedure Laterality Date     APPENDECTOMY           Medications:  Current Outpatient Medications   Medication     acetaminophen (TYLENOL) 500 MG tablet     ferrous sulfate (SLO-FE) 142 (45 Fe) MG CR tablet     Prenatal Vit-Fe Fumarate-FA (PRENATAL MULTIVITAMIN W/IRON) 27-0.8 MG tablet     sertraline (ZOLOFT) 25 MG tablet     No current facility-administered medications for this visit.         Allergies:       Allergies   Allergen Reactions     Sulfa Drugs Hives and Anaphylaxis     Atomoxetine GI Disturbance     Weight loss     Latex Rash     bandaides only         Social History:  Social History     Tobacco Use     Smoking status: Former Smoker     Packs/day: 1.00     Smokeless tobacco: Never Used   Vaping Use     Vaping Use: Former   Substance Use Topics     Alcohol use: No     Drug use: No         Family History:  Family History   Problem Relation Age of Onset     Bipolar Disorder Mother      Attention Deficit Disorder Father      Thyroid Disease Father      Hyperthyroidism Sister      No Known Problems Brother      Brain Tumor Maternal Grandmother      Cancer Maternal Grandfather      Heart Disease Paternal Grandfather      Cancer Paternal  Grandfather          ROS:   Respiratory: No shortness of breath, dyspnea on exertion, cough, or hemoptysis  Cardiovascular: negative for palpitations, chest pain, lower extremity edema and syncope or near-syncope  Gastrointestinal: negative for, nausea, vomiting and hematemesis  Genitourinary: negative for, dysuria, frequency and urgency  Musculoskeletal: negative for, back pain and muscular weakness  Psychiatric: negative for, anxiety, depression and hallucinations  Hematologic/Lymphatic/Immunologic: negative for, anemia, chills and fever      Physical Exam  /60 (BP Location: Right arm, Patient Position: Sitting, Cuff Size: Adult Regular)   Pulse 64   Wt 66.7 kg (147 lb)   LMP 2021 (Exact Date)   Breastfeeding Yes   BMI 27.55 kg/m    Gen: Well-appearing, no acute distress, well-groomed, alert  HEENT: Normocephalic, atraumatic  Cardiovascular: Regular rate, No peripheral edema, normal peripheral circulation  Pulm: Symmetric chest rise, non-labored respirations, clear on auscultation  Abd: Soft, non-tender, non-distended    ASA class 1      Assessment/Plan  Bebe Carl is a 21 year old  female here for the following procedure: laparoscopic bilateral salpingectomy. Extensively counseled on the risks and benefits of the procedure, including bleeding, infection, injury to surrounding organs, sterilization failure. Particularly discussed the increased risk of regret due to her young age and she still desires to proceed. She signed federal sterilization consent on 3/15/22. Tentatively scheduled for 22.        Mandie Shook MD on 2022 at 9:04 AM

## 2022-06-29 NOTE — PROGRESS NOTES
"Date of Surgery: 7/26/22  Type of Surgery: Bilateral Salpingectomy  Surgeon: Dr. Gayathri Shook     Patient was given surgical folder  which includes pre-operative bathing instructions related to the two packets of Hibiclens surgical prep provided. appropriate appointments were scheduled by the [facility] . Surgical forms were copied and kept for informative purposes. Originals were delivered to Day-surgery. Questions were answered to the best of this nurse's ability.        STOP BANG    Fever/Chills or other infectious symptoms in past month? no  >10 pound weight loss in the past 2 months? Yes, recent delivery  Health Care Directive on file? no  History of blood transfusions? Yes, 1/26/20  Td up to date? yes  History of VRE/MRSA? no      Obstructive Sleep Apnea screening    Preoperative Evaluation: Obstructive Sleep Apnea screening    S: Snore -  Do you snore loudly? (louder than talking or loud enough to be heard through closed doors) Yes  T: Tired - Do you often feel tired, fatigued, or sleepy during the daytime?No  O: Observed - Has anyone ever observed you stop breathing during your sleep?Yes  P: Pressure - Do you have or are you being treated for high blood pressure?No  B: BMI - BMI greater than 35kg/m2?No  A: Age - Age over 50 years old?No  N: Neck - Neck circumference greater than 40 cm?No  G: Gender - Gender: Male?No    Total number of \"YES\" responses:  2    Scoring: Low risk of OMAR 0-2  At Risk of OMAR: >3 High Risk of OMAR: 5-8      Total yes answers in OMAR section:    Low risk 0-2  At risk 3-4  High risk 5-8    Lalita Calzada RN............. 6/29/2022 9:08 AM     "

## 2022-06-29 NOTE — NURSING NOTE
Chief Complaint   Patient presents with     Consult For     Tubal consult          Medication Reconciliation: complete    Nancy Cortes LPN........................6/29/2022  9:05 AM

## 2022-07-25 ENCOUNTER — TELEPHONE (OUTPATIENT)
Dept: OBGYN | Facility: OTHER | Age: 22
End: 2022-07-25

## 2022-07-25 NOTE — TELEPHONE ENCOUNTER
Called patient after verification of name and , rescheduled procedure to 22.   Jennifer Hodge RN on 2022 at 1:32 PM

## 2022-08-01 ENCOUNTER — OFFICE VISIT (OUTPATIENT)
Dept: FAMILY MEDICINE | Facility: OTHER | Age: 22
End: 2022-08-01
Attending: PHYSICIAN ASSISTANT
Payer: COMMERCIAL

## 2022-08-01 VITALS
RESPIRATION RATE: 16 BRPM | HEART RATE: 69 BPM | BODY MASS INDEX: 27.74 KG/M2 | OXYGEN SATURATION: 99 % | SYSTOLIC BLOOD PRESSURE: 120 MMHG | DIASTOLIC BLOOD PRESSURE: 80 MMHG | TEMPERATURE: 98.4 F | WEIGHT: 148 LBS

## 2022-08-01 DIAGNOSIS — R07.0 THROAT PAIN: ICD-10-CM

## 2022-08-01 DIAGNOSIS — J02.9 VIRAL PHARYNGITIS: Primary | ICD-10-CM

## 2022-08-01 LAB — GROUP A STREP BY PCR: NOT DETECTED

## 2022-08-01 PROCEDURE — G0463 HOSPITAL OUTPT CLINIC VISIT: HCPCS

## 2022-08-01 PROCEDURE — 99213 OFFICE O/P EST LOW 20 MIN: CPT | Performed by: NURSE PRACTITIONER

## 2022-08-01 PROCEDURE — 87651 STREP A DNA AMP PROBE: CPT | Mod: ZL | Performed by: NURSE PRACTITIONER

## 2022-08-01 ASSESSMENT — PAIN SCALES - GENERAL: PAINLEVEL: MILD PAIN (3)

## 2022-08-01 NOTE — PROGRESS NOTES
ASSESSMENT/PLAN:     I have reviewed the nursing notes.  I have reviewed the findings, diagnosis, plan and need for follow up with the patient.        1. Throat pain    - Group A Streptococcus PCR Throat Swab    2. Viral pharyngitis    Negative strep PCR test     Discussed with patient that symptoms and exam are consistent with viral illness.    No clinical indications for antibiotic treatment at this time.      Symptomatic treatment - Encouraged fluids, salt water gargles, honey, elevation, lozenges, tea, soup, smoothies, popsicles, etc     May use over-the-counter Tylenol or ibuprofen PRN    Discussed warning signs/symptoms indicative of need to f/u  Follow up if symptoms persist or worsen or concerns      I explained my diagnostic considerations and recommendations to the patient, who voiced understanding and agreement with the treatment plan. All questions were answered. We discussed potential side effects of any prescribed or recommended therapies, as well as expectations for response to treatments.    Eneida Barber NP  [facility]      SUBJECTIVE:   Bebe Carl is a 21 year old female who presents to clinic today for the following health issues:  Strep test    HPI  Sore throat started 2 days ago.  Yesterday difficulty swallowing due to swelling, able to swallow liquids but not solids.    Swelling lessened today able to swallow today without difficulty just some pain.  Left ear pain for the past 2 days with pressure and popping.  No fevers.  Headache across bridge of nose from her glasses.  No nausea or vomiting.  Decreased solid intake due to swallowing.  Energy at baseline.  Congested cough.  No shortness of breath.   She is breast feeding, currently 8 weeks post partum.  Negative rapid home covid test yesterday.        Past Medical History:   Diagnosis Date     Acute gastroenteritis 12/24/2019     ADHD (attention deficit hyperactivity disorder)      Anemia     following delivery       Bipolar 1 disorder (H)      Depression      Depressive disorder      History of blood transfusion      Postpartum depression      Viral gastroenteritis 12/24/2019     Past Surgical History:   Procedure Laterality Date     APPENDECTOMY       Social History     Tobacco Use     Smoking status: Former Smoker     Packs/day: 1.00     Smokeless tobacco: Never Used   Substance Use Topics     Alcohol use: No     Current Outpatient Medications   Medication Sig Dispense Refill     acetaminophen (TYLENOL) 500 MG tablet Take 1,000 mg by mouth every 6 hours as needed for mild pain       Allergies   Allergen Reactions     Sulfa Drugs Hives and Anaphylaxis     Atomoxetine GI Disturbance     Weight loss     Latex Rash     bandaides only         Past medical history, past surgical history, current medications and allergies reviewed and accurate to the best of my knowledge.        OBJECTIVE:     /80   Pulse 69   Temp 98.4  F (36.9  C) (Temporal)   Resp 16   Wt 67.1 kg (148 lb)   LMP 09/04/2021 (Exact Date)   SpO2 99%   Breastfeeding Yes   BMI 27.74 kg/m    Body mass index is 27.74 kg/m .     Physical Exam  General Appearance: Well appearing adolescent female, appropriate appearance for age. No acute distress  Ears: Left TM intact with bony landmarks appreciated, no erythema, no effusion, no bulging, no purulence.  Right TM intact with bony landmarks appreciated, no erythema, no effusion, no bulging, no purulence.  Left auditory canal clear without drainage or bleeding.  Right auditory canal clear without drainage or bleeding.  Normal external ears, non tender.  Eyes: conjunctivae normal without erythema or irritation, corneas clear, no drainage or crusting, no eyelid swelling, pupils equal   Orophayrnx: moist mucous membranes, pharynx with erythema, tonsils without hypertrophy, tonsils with mild erythema, no tonsillar exudates, no oral lesions, no palate petechiae, no post nasal drip seen, no trismus, voice clear.     Nose:  No noted drainage or congestion   Neck: Mild anterior cervical area with tenderness to palpation, no palpable tonsillar or cervical lymph nodes  Respiratory: normal chest wall and respirations.  Normal effort.  Clear to auscultation bilaterally, no wheezing, crackles or rhonchi.  No increased work of breathing.  No cough appreciated.  Cardiac: RRR with no murmurs  Musculoskeletal:  Equal movement of bilateral upper extremities.  Equal movement of bilateral lower extremities.  Normal gait.    Psychological: normal affect, alert, oriented, and pleasant.       Labs:  Results for orders placed or performed in visit on 08/01/22   Group A Streptococcus PCR Throat Swab     Status: Normal    Specimen: Throat; Swab   Result Value Ref Range    Group A strep by PCR Not Detected Not Detected    Narrative    The Xpert Xpress Strep A test, performed on the [employer], is a rapid, qualitative in vitro diagnostic test for the detection of Streptococcus pyogenes (Group A ß-hemolytic Streptococcus, Strep A) in throat swab specimens from patients with signs and symptoms of pharyngitis. The Xpert Xpress Strep A test can be used as an aid in the diagnosis of Group A Streptococcal pharyngitis. The assay is not intended to monitor treatment for Group A Streptococcus infections. The Xpert Xpress Strep A test utilizes an automated real-time polymerase chain reaction (PCR) to detect Streptococcus pyogenes DNA.

## 2022-08-01 NOTE — NURSING NOTE
"Chief Complaint   Patient presents with     Pharyngitis     For 2 days   She has had a sore throat and ear pain for 2 days. She did a home covid last night and it was negative.  Trish Negron LPN..................8/1/2022   5:46 PM      Initial /80   Pulse 69   Temp 98.4  F (36.9  C) (Temporal)   Resp 16   Wt 67.1 kg (148 lb)   LMP 09/04/2021 (Exact Date)   SpO2 99%   Breastfeeding Yes   BMI 27.74 kg/m   Estimated body mass index is 27.74 kg/m  as calculated from the following:    Height as of 6/15/22: 1.556 m (5' 1.25\").    Weight as of this encounter: 67.1 kg (148 lb).  Medication Reconciliation: complete    FOOD SECURITY SCREENING QUESTIONS  Hunger Vital Signs:  Within the past 12 months we worried whether our food would run out before we got money to buy more. Never  Within the past 12 months the food we bought just didn't last and we didn't have money to get more. Never        Advance care directive on file? no  Advance care directive provided to patient? declined     Trish Negron LPN  "

## 2022-08-10 ENCOUNTER — ANESTHESIA EVENT (OUTPATIENT)
Dept: SURGERY | Facility: OTHER | Age: 22
End: 2022-08-10
Payer: COMMERCIAL

## 2022-08-11 ENCOUNTER — HOSPITAL ENCOUNTER (OUTPATIENT)
Facility: OTHER | Age: 22
Discharge: HOME | End: 2022-08-11
Attending: OBSTETRICS & GYNECOLOGY | Admitting: OBSTETRICS & GYNECOLOGY
Payer: COMMERCIAL

## 2022-08-11 ENCOUNTER — ANESTHESIA (OUTPATIENT)
Dept: SURGERY | Facility: OTHER | Age: 22
End: 2022-08-11
Payer: COMMERCIAL

## 2022-08-11 VITALS
OXYGEN SATURATION: 93 % | SYSTOLIC BLOOD PRESSURE: 95 MMHG | BODY MASS INDEX: 27.74 KG/M2 | RESPIRATION RATE: 18 BRPM | TEMPERATURE: 97.7 F | WEIGHT: 148 LBS | HEART RATE: 46 BPM | DIASTOLIC BLOOD PRESSURE: 53 MMHG

## 2022-08-11 DIAGNOSIS — Z30.09 STERILIZATION CONSULT: ICD-10-CM

## 2022-08-11 DIAGNOSIS — Z98.890 S/P LAPAROSCOPY: Primary | ICD-10-CM

## 2022-08-11 LAB
GLUCOSE BLDC GLUCOMTR-MCNC: 73 MG/DL (ref 70–99)
HCG UR QL: NEGATIVE

## 2022-08-11 PROCEDURE — 258N000003 HC RX IP 258 OP 636: Performed by: NURSE ANESTHETIST, CERTIFIED REGISTERED

## 2022-08-11 PROCEDURE — 58661 LAPAROSCOPY REMOVE ADNEXA: CPT | Performed by: NURSE ANESTHETIST, CERTIFIED REGISTERED

## 2022-08-11 PROCEDURE — 250N000011 HC RX IP 250 OP 636: Performed by: OBSTETRICS & GYNECOLOGY

## 2022-08-11 PROCEDURE — 250N000013 HC RX MED GY IP 250 OP 250 PS 637: Performed by: OBSTETRICS & GYNECOLOGY

## 2022-08-11 PROCEDURE — 999N000141 HC STATISTIC PRE-PROCEDURE NURSING ASSESSMENT: Performed by: OBSTETRICS & GYNECOLOGY

## 2022-08-11 PROCEDURE — 88302 TISSUE EXAM BY PATHOLOGIST: CPT

## 2022-08-11 PROCEDURE — 710N000012 HC RECOVERY PHASE 2, PER MINUTE: Performed by: OBSTETRICS & GYNECOLOGY

## 2022-08-11 PROCEDURE — 250N000025 HC SEVOFLURANE, PER MIN: Performed by: OBSTETRICS & GYNECOLOGY

## 2022-08-11 PROCEDURE — 81025 URINE PREGNANCY TEST: CPT | Performed by: OBSTETRICS & GYNECOLOGY

## 2022-08-11 PROCEDURE — 82962 GLUCOSE BLOOD TEST: CPT

## 2022-08-11 PROCEDURE — 710N000010 HC RECOVERY PHASE 1, LEVEL 2, PER MIN: Performed by: OBSTETRICS & GYNECOLOGY

## 2022-08-11 PROCEDURE — 58661 LAPAROSCOPY REMOVE ADNEXA: CPT | Performed by: OBSTETRICS & GYNECOLOGY

## 2022-08-11 PROCEDURE — 370N000017 HC ANESTHESIA TECHNICAL FEE, PER MIN: Performed by: OBSTETRICS & GYNECOLOGY

## 2022-08-11 PROCEDURE — 250N000011 HC RX IP 250 OP 636: Performed by: NURSE ANESTHETIST, CERTIFIED REGISTERED

## 2022-08-11 PROCEDURE — 360N000077 HC SURGERY LEVEL 4, PER MIN: Performed by: OBSTETRICS & GYNECOLOGY

## 2022-08-11 PROCEDURE — 250N000009 HC RX 250: Performed by: NURSE ANESTHETIST, CERTIFIED REGISTERED

## 2022-08-11 PROCEDURE — 272N000001 HC OR GENERAL SUPPLY STERILE: Performed by: OBSTETRICS & GYNECOLOGY

## 2022-08-11 RX ORDER — ACETAMINOPHEN 325 MG/1
975 TABLET ORAL ONCE
Status: COMPLETED | OUTPATIENT
Start: 2022-08-11 | End: 2022-08-11

## 2022-08-11 RX ORDER — FENTANYL CITRATE 50 UG/ML
50 INJECTION, SOLUTION INTRAMUSCULAR; INTRAVENOUS
Status: DISCONTINUED | OUTPATIENT
Start: 2022-08-11 | End: 2022-08-11 | Stop reason: HOSPADM

## 2022-08-11 RX ORDER — KETOROLAC TROMETHAMINE 30 MG/ML
INJECTION, SOLUTION INTRAMUSCULAR; INTRAVENOUS PRN
Status: DISCONTINUED | OUTPATIENT
Start: 2022-08-11 | End: 2022-08-11

## 2022-08-11 RX ORDER — ONDANSETRON 4 MG/1
4 TABLET, ORALLY DISINTEGRATING ORAL EVERY 30 MIN PRN
Status: DISCONTINUED | OUTPATIENT
Start: 2022-08-11 | End: 2022-08-11 | Stop reason: HOSPADM

## 2022-08-11 RX ORDER — IBUPROFEN 200 MG
800 TABLET ORAL ONCE
Status: DISCONTINUED | OUTPATIENT
Start: 2022-08-11 | End: 2022-08-11 | Stop reason: HOSPADM

## 2022-08-11 RX ORDER — NALOXONE HYDROCHLORIDE 0.4 MG/ML
0.2 INJECTION, SOLUTION INTRAMUSCULAR; INTRAVENOUS; SUBCUTANEOUS
Status: DISCONTINUED | OUTPATIENT
Start: 2022-08-11 | End: 2022-08-11 | Stop reason: HOSPADM

## 2022-08-11 RX ORDER — ACETAMINOPHEN 325 MG/1
975 TABLET ORAL EVERY 6 HOURS PRN
Qty: 50 TABLET | Refills: 0 | Status: ON HOLD | OUTPATIENT
Start: 2022-08-11 | End: 2024-01-31

## 2022-08-11 RX ORDER — IBUPROFEN 800 MG/1
800 TABLET, FILM COATED ORAL EVERY 6 HOURS PRN
Qty: 30 TABLET | Refills: 0 | Status: ON HOLD | OUTPATIENT
Start: 2022-08-11 | End: 2024-01-31

## 2022-08-11 RX ORDER — ONDANSETRON 2 MG/ML
INJECTION INTRAMUSCULAR; INTRAVENOUS PRN
Status: DISCONTINUED | OUTPATIENT
Start: 2022-08-11 | End: 2022-08-11

## 2022-08-11 RX ORDER — OXYCODONE HYDROCHLORIDE 5 MG/1
5 TABLET ORAL
Status: DISCONTINUED | OUTPATIENT
Start: 2022-08-11 | End: 2022-08-11 | Stop reason: HOSPADM

## 2022-08-11 RX ORDER — OXYCODONE HYDROCHLORIDE 5 MG/1
5 TABLET ORAL EVERY 4 HOURS PRN
Status: DISCONTINUED | OUTPATIENT
Start: 2022-08-11 | End: 2022-08-11 | Stop reason: HOSPADM

## 2022-08-11 RX ORDER — FENTANYL CITRATE 50 UG/ML
INJECTION, SOLUTION INTRAMUSCULAR; INTRAVENOUS PRN
Status: DISCONTINUED | OUTPATIENT
Start: 2022-08-11 | End: 2022-08-11

## 2022-08-11 RX ORDER — PROPOFOL 10 MG/ML
INJECTION, EMULSION INTRAVENOUS PRN
Status: DISCONTINUED | OUTPATIENT
Start: 2022-08-11 | End: 2022-08-11

## 2022-08-11 RX ORDER — BUPIVACAINE HYDROCHLORIDE 2.5 MG/ML
INJECTION, SOLUTION INFILTRATION; PERINEURAL PRN
Status: DISCONTINUED | OUTPATIENT
Start: 2022-08-11 | End: 2022-08-11 | Stop reason: HOSPADM

## 2022-08-11 RX ORDER — PROPOFOL 10 MG/ML
INJECTION, EMULSION INTRAVENOUS CONTINUOUS PRN
Status: DISCONTINUED | OUTPATIENT
Start: 2022-08-11 | End: 2022-08-11

## 2022-08-11 RX ORDER — ONDANSETRON 2 MG/ML
4 INJECTION INTRAMUSCULAR; INTRAVENOUS EVERY 30 MIN PRN
Status: DISCONTINUED | OUTPATIENT
Start: 2022-08-11 | End: 2022-08-11 | Stop reason: HOSPADM

## 2022-08-11 RX ORDER — LIDOCAINE HYDROCHLORIDE 20 MG/ML
INJECTION, SOLUTION INFILTRATION; PERINEURAL PRN
Status: DISCONTINUED | OUTPATIENT
Start: 2022-08-11 | End: 2022-08-11

## 2022-08-11 RX ORDER — SODIUM CHLORIDE, SODIUM LACTATE, POTASSIUM CHLORIDE, CALCIUM CHLORIDE 600; 310; 30; 20 MG/100ML; MG/100ML; MG/100ML; MG/100ML
INJECTION, SOLUTION INTRAVENOUS CONTINUOUS
Status: DISCONTINUED | OUTPATIENT
Start: 2022-08-11 | End: 2022-08-11 | Stop reason: HOSPADM

## 2022-08-11 RX ORDER — OXYCODONE HYDROCHLORIDE 5 MG/1
5-10 TABLET ORAL EVERY 4 HOURS PRN
Qty: 20 TABLET | Refills: 0 | Status: SHIPPED | OUTPATIENT
Start: 2022-08-11 | End: 2023-07-11

## 2022-08-11 RX ORDER — MEPERIDINE HYDROCHLORIDE 50 MG/ML
12.5 INJECTION INTRAMUSCULAR; INTRAVENOUS; SUBCUTANEOUS
Status: DISCONTINUED | OUTPATIENT
Start: 2022-08-11 | End: 2022-08-11 | Stop reason: HOSPADM

## 2022-08-11 RX ORDER — NALOXONE HYDROCHLORIDE 0.4 MG/ML
0.4 INJECTION, SOLUTION INTRAMUSCULAR; INTRAVENOUS; SUBCUTANEOUS
Status: DISCONTINUED | OUTPATIENT
Start: 2022-08-11 | End: 2022-08-11 | Stop reason: HOSPADM

## 2022-08-11 RX ORDER — ACETAMINOPHEN 325 MG/1
975 TABLET ORAL ONCE
Status: DISCONTINUED | OUTPATIENT
Start: 2022-08-11 | End: 2022-08-11 | Stop reason: HOSPADM

## 2022-08-11 RX ORDER — HYDROMORPHONE HYDROCHLORIDE 1 MG/ML
0.4 INJECTION, SOLUTION INTRAMUSCULAR; INTRAVENOUS; SUBCUTANEOUS EVERY 5 MIN PRN
Status: DISCONTINUED | OUTPATIENT
Start: 2022-08-11 | End: 2022-08-11 | Stop reason: HOSPADM

## 2022-08-11 RX ORDER — FENTANYL CITRATE 50 UG/ML
50 INJECTION, SOLUTION INTRAMUSCULAR; INTRAVENOUS EVERY 5 MIN PRN
Status: DISCONTINUED | OUTPATIENT
Start: 2022-08-11 | End: 2022-08-11 | Stop reason: HOSPADM

## 2022-08-11 RX ORDER — LIDOCAINE 40 MG/G
CREAM TOPICAL
Status: DISCONTINUED | OUTPATIENT
Start: 2022-08-11 | End: 2022-08-11 | Stop reason: HOSPADM

## 2022-08-11 RX ORDER — DEXAMETHASONE SODIUM PHOSPHATE 4 MG/ML
INJECTION, SOLUTION INTRA-ARTICULAR; INTRALESIONAL; INTRAMUSCULAR; INTRAVENOUS; SOFT TISSUE PRN
Status: DISCONTINUED | OUTPATIENT
Start: 2022-08-11 | End: 2022-08-11

## 2022-08-11 RX ADMIN — SODIUM CHLORIDE, SODIUM LACTATE, POTASSIUM CHLORIDE, AND CALCIUM CHLORIDE: 600; 310; 30; 20 INJECTION, SOLUTION INTRAVENOUS at 12:01

## 2022-08-11 RX ADMIN — SODIUM CHLORIDE, SODIUM LACTATE, POTASSIUM CHLORIDE, AND CALCIUM CHLORIDE: 600; 310; 30; 20 INJECTION, SOLUTION INTRAVENOUS at 13:34

## 2022-08-11 RX ADMIN — KETOROLAC TROMETHAMINE 30 MG: 30 INJECTION, SOLUTION INTRAMUSCULAR at 13:29

## 2022-08-11 RX ADMIN — FENTANYL CITRATE 100 MCG: 50 INJECTION, SOLUTION INTRAMUSCULAR; INTRAVENOUS at 13:19

## 2022-08-11 RX ADMIN — SUGAMMADEX 200 MG: 100 INJECTION, SOLUTION INTRAVENOUS at 13:47

## 2022-08-11 RX ADMIN — PROPOFOL 150 MG: 10 INJECTION, EMULSION INTRAVENOUS at 13:20

## 2022-08-11 RX ADMIN — PROPOFOL 250 MCG/KG/MIN: 10 INJECTION, EMULSION INTRAVENOUS at 13:24

## 2022-08-11 RX ADMIN — LIDOCAINE HYDROCHLORIDE 60 MG: 20 INJECTION, SOLUTION INFILTRATION; PERINEURAL at 13:20

## 2022-08-11 RX ADMIN — ACETAMINOPHEN 975 MG: 325 TABLET, FILM COATED ORAL at 11:28

## 2022-08-11 RX ADMIN — ONDANSETRON HYDROCHLORIDE 4 MG: 2 SOLUTION INTRAMUSCULAR; INTRAVENOUS at 13:29

## 2022-08-11 RX ADMIN — ROCURONIUM BROMIDE 35 MG: 50 INJECTION, SOLUTION INTRAVENOUS at 13:20

## 2022-08-11 RX ADMIN — SODIUM CHLORIDE, SODIUM LACTATE, POTASSIUM CHLORIDE, AND CALCIUM CHLORIDE: 600; 310; 30; 20 INJECTION, SOLUTION INTRAVENOUS at 11:28

## 2022-08-11 RX ADMIN — MIDAZOLAM HYDROCHLORIDE 2 MG: 1 INJECTION, SOLUTION INTRAMUSCULAR; INTRAVENOUS at 13:19

## 2022-08-11 RX ADMIN — DEXAMETHASONE SODIUM PHOSPHATE 4 MG: 4 INJECTION, SOLUTION INTRAMUSCULAR; INTRAVENOUS at 13:29

## 2022-08-11 ASSESSMENT — ACTIVITIES OF DAILY LIVING (ADL)
ADLS_ACUITY_SCORE: 35
ADLS_ACUITY_SCORE: 33
ADLS_ACUITY_SCORE: 35

## 2022-08-11 ASSESSMENT — LIFESTYLE VARIABLES: TOBACCO_USE: 1

## 2022-08-11 NOTE — DISCHARGE INSTRUCTIONS
[facility]  Adult Discharge Orders & Instructions      For 24 hours after surgery:  Get plenty of rest.  A responsible adult must stay with you for at least 24 hours after you leave the hospital.   You may feel lightheaded.  IF so, sit for a few minutes before standing.  Have someone help you get up.   You may have a slight fever. Call the doctor if your fever is over 101 F (38.3 C) (taken under the tongue) or lasts longer than 24 hours.  You may have a dry mouth, a sore throat, muscle aches or trouble sleeping.  These should go away after 24 hours.  Do not make important or legal decisions.  6.   Do not drive or use heavy equipment.  If you have weakness or tingling, don't drive or use heavy equipment until this feeling goes away.                                                                                                                                                                         To contact a doctor, call    [phone]______________

## 2022-08-11 NOTE — ANESTHESIA CARE TRANSFER NOTE
Patient: Bebe Carl    Procedure: Procedure(s):  SALPINGECTOMY, LAPAROSCOPIC       Diagnosis: Sterilization consult [Z30.09]  Diagnosis Additional Information: No value filed.    Anesthesia Type:   General     Note:    Oropharynx: oropharynx clear of all foreign objects  Level of Consciousness: drowsy  Oxygen Supplementation: face mask  Level of Supplemental Oxygen (L/min / FiO2): 6  Independent Airway: airway patency satisfactory and stable    Vital Signs Stable: post-procedure vital signs reviewed and stable  Report to RN Given: handoff report given  Patient transferred to: PACU    Handoff Report: Identifed the Patient, Identified the Reponsible Provider, Reviewed the pertinent medical history, Discussed the surgical course, Reviewed Intra-OP anesthesia mangement and issues during anesthesia, Set expectations for post-procedure period and Allowed opportunity for questions and acknowledgement of understanding      Vitals:  Vitals Value Taken Time   BP 93/56 08/11/22 1435   Temp 97.7  F (36.5  C) 08/11/22 1430   Pulse 51 08/11/22 1437   Resp 18 08/11/22 1430   SpO2 100 % 08/11/22 1437   Vitals shown include unvalidated device data.    Electronically Signed By: BONNIE ARAGON CRNA  August 11, 2022  2:53 PM

## 2022-08-11 NOTE — H&P
Surgical H&P    Chief Complaint/Reason for procedure: bilateral salpingectomy    HPI:    Bebe Carl is a 21 year old , here for laparoscopic bilateral salpingectomy for sterilization. She is 100% confident in her decision.       Past medical history:  Past Medical History:   Diagnosis Date     Acute gastroenteritis 2019     ADHD (attention deficit hyperactivity disorder)      Anemia     following delivery      Bipolar 1 disorder (H)      Depression      Depressive disorder      History of blood transfusion      Postpartum depression      Viral gastroenteritis 2019         Ms. Carl general medical condition is able to tolerate the procedure and anesthesia planned.     Past Surgical History:  Past Surgical History:   Procedure Laterality Date     APPENDECTOMY           Medications:  Current Facility-Administered Medications   Medication     fentaNYL (PF) (SUBLIMAZE) injection 50 mcg     lactated ringers infusion     lidocaine (LMX4) cream     lidocaine 1 % 0.1-1 mL     naloxone (NARCAN) injection 0.2 mg    Or     naloxone (NARCAN) injection 0.4 mg    Or     naloxone (NARCAN) injection 0.2 mg    Or     naloxone (NARCAN) injection 0.4 mg     PRE OP antibiotics NOT needed for this surgical procedure     sodium chloride (PF) 0.9% PF flush 3 mL     sodium chloride (PF) 0.9% PF flush 3 mL         Allergies:       Allergies   Allergen Reactions     Sulfa Drugs Hives and Anaphylaxis     Atomoxetine GI Disturbance     Weight loss     Latex Rash     bandaides only         Social History:  Social History     Tobacco Use     Smoking status: Former Smoker     Packs/day: 1.00     Smokeless tobacco: Never Used   Vaping Use     Vaping Use: Former   Substance Use Topics     Alcohol use: No     Drug use: No         Family History:  Family History   Problem Relation Age of Onset     Bipolar Disorder Mother      Attention Deficit Disorder Father      Thyroid Disease Father      Hyperthyroidism Sister      No  Known Problems Brother      Brain Tumor Maternal Grandmother      Cancer Maternal Grandfather      Heart Disease Paternal Grandfather      Cancer Paternal Grandfather          ROS:   Respiratory: No shortness of breath, dyspnea on exertion, cough, or hemoptysis  Cardiovascular: negative for palpitations, chest pain, lower extremity edema and syncope or near-syncope  Gastrointestinal: negative for, nausea, vomiting and hematemesis  Genitourinary: negative for, dysuria, frequency and urgency  Musculoskeletal: negative for, back pain and muscular weakness  Psychiatric: negative for, anxiety, depression and hallucinations  Hematologic/Lymphatic/Immunologic: negative for, anemia, chills and fever      Physical Exam  /70 (Cuff Size: Adult Regular)   Pulse 66   Temp 98.1  F (36.7  C) (Tympanic)   Resp 16   Wt 67.1 kg (148 lb)   LMP 2021 (Exact Date)   SpO2 98%   Breastfeeding Yes   BMI 27.74 kg/m    Gen: Well-appearing, no acute distress, well-groomed, alert  HEENT: Normocephalic, atraumatic  Cardiovascular: Regular rate, No peripheral edema, normal peripheral circulation  Pulm: Symmetric chest rise, non-labored respirations, clear on auscultation    ASA class 1      Assessment/Plan  Bebe Carl is a 21 year old  female here for the following procedure: laparoscopic bilateral salpingectomy- okay to proceed. She is 100% certain she has completed childbearing.        Mandie Shook MD on 2022 at 12:56 PM

## 2022-08-11 NOTE — ANESTHESIA PREPROCEDURE EVALUATION
Anesthesia Pre-Procedure Evaluation    Patient: Bebe Carl   MRN: [id number] : 2000        Procedure : Procedure(s):  SALPINGECTOMY, LAPAROSCOPIC          Past Medical History:   Diagnosis Date     Acute gastroenteritis 2019     ADHD (attention deficit hyperactivity disorder)      Anemia     following delivery      Bipolar 1 disorder (H)      Depression      Depressive disorder      History of blood transfusion      Postpartum depression      Viral gastroenteritis 2019      Past Surgical History:   Procedure Laterality Date     APPENDECTOMY        Allergies   Allergen Reactions     Sulfa Drugs Hives and Anaphylaxis     Atomoxetine GI Disturbance     Weight loss     Latex Rash     bandaides only      Social History     Tobacco Use     Smoking status: Former Smoker     Packs/day: 1.00     Smokeless tobacco: Never Used   Substance Use Topics     Alcohol use: No      Wt Readings from Last 1 Encounters:   22 67.1 kg (148 lb)        Anesthesia Evaluation   Pt has had prior anesthetic. Type: General and Regional.        ROS/MED HX  ENT/Pulmonary:     (+) OMAR risk factors, snores loudly, tobacco use, Past use,     Neurologic:       Cardiovascular:  - neg cardiovascular ROS     METS/Exercise Tolerance: >4 METS    Hematologic:     (+) anemia,     Musculoskeletal:  - neg musculoskeletal ROS     GI/Hepatic:  - neg GI/hepatic ROS     Renal/Genitourinary:  - neg Renal ROS     Endo:  - neg endo ROS     Psychiatric/Substance Use:     (+) psychiatric history depression, anxiety and bipolar (ADHD)     Infectious Disease:  - neg infectious disease ROS     Malignancy:  - neg malignancy ROS     Other:  - neg other ROS          Physical Exam    Airway        Mallampati: I   TM distance: > 3 FB   Neck ROM: full   Mouth opening: > 3 cm    Respiratory Devices and Support         Dental  no notable dental history         Cardiovascular   cardiovascular exam normal          Pulmonary   pulmonary exam normal                 OUTSIDE LABS:  CBC:   Lab Results   Component Value Date    WBC 10.6 06/15/2022    WBC 12.5 (H) 06/12/2022    HGB 11.2 (L) 06/15/2022    HGB 11.3 (L) 06/12/2022    HCT 35.0 06/15/2022    HCT 35.9 06/12/2022     (H) 06/15/2022     (H) 06/12/2022     BMP:   Lab Results   Component Value Date     06/15/2022     06/12/2022    POTASSIUM 4.3 06/15/2022    POTASSIUM 4.1 06/12/2022    CHLORIDE 106 06/15/2022    CHLORIDE 103 06/12/2022    CO2 24 06/15/2022    CO2 24 06/12/2022    BUN 6 (L) 06/15/2022    BUN 7 06/12/2022    CR 0.74 06/15/2022    CR 0.64 06/12/2022    GLC 81 06/15/2022    GLC 84 06/12/2022     COAGS: No results found for: PTT, INR, FIBR  POC:   Lab Results   Component Value Date    HCG Positive (A) 10/10/2021     HEPATIC:   Lab Results   Component Value Date    ALBUMIN 3.8 06/15/2022    PROTTOTAL 6.9 06/15/2022    ALT 6 (L) 06/15/2022    AST 11 (L) 06/15/2022    ALKPHOS 92 06/15/2022    BILITOTAL 0.5 06/15/2022    BILIDIRECT 0.39 (H) 12/20/2016     OTHER:   Lab Results   Component Value Date    LACT 0.8 02/26/2020    NILSON 8.2 (L) 06/15/2022    MAG 2.5 06/15/2022    TSH 0.66 10/30/2021    CRP 68.1 (H) 06/12/2022       Anesthesia Plan    ASA Status:  2   NPO Status:  NPO Appropriate    Anesthesia Type: General.     - Airway: ETT              Consents    Anesthesia Plan(s) and associated risks, benefits, and realistic alternatives discussed. Questions answered and patient/representative(s) expressed understanding.     - Discussed: Risks, Benefits and Alternatives for BOTH SEDATION and the PROCEDURE were discussed     - Discussed with:  Patient      - Extended Intubation/Ventilatory Support Discussed: No.      - Patient is DNR/DNI Status: No    Use of blood products discussed: Yes.     - Discussed with: Patient.     - Consented: consented to blood products            Reason for refusal: other.     Postoperative Care    Pain management: IV analgesics, Multi-modal analgesia.   PONV  prophylaxis: Ondansetron (or other 5HT-3), Dexamethasone or Solumedrol     Comments:    Other Comments: Pt is breast feeding            BONNIE ARAGON CRNA

## 2022-08-11 NOTE — OP NOTE
Gynecologic Operative Note   Bebe Carl  [id number]  8/11/2022    Preoperative Diagnosis:   Desire for sterilization     Postoperative Diagnosis:   Same s/p below procedure     Procedure:   Laparoscopic bilateral salpingectomy    Surgeon: Mandie Shook MD     Anesthesia: general endotracheal     Complications: none     EBL: 5 mL     Findings: Upon entry of the abdomen with the laparoscope, no evidence of injury.  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 Normal appearing uterus, fallopian tubes and ovaries bilaterally.     Indications: Ms. Carl is a 21 year old female who presented with desire for sterilization.  After extensive counseling regarding the risks, benefits and alternatives of the procedure- including all reversible forms of contraception, risk of bleeding, infection, injury to surrounding organs, failure and regret, she still desired to proceed.    Technique:  The patient was taken to the operating room where she was placed in the supine position. General endotracheal anesthesia was administered.  The patient was then prepped and draped in the usual sterile fashion. Attention was then turned to the abdomen where 0.25% bupivicaine was used to infiltrate the superior aspect of the umbilicus. An 11-blade scalpel was used to make a 5 mm supraumbilical and a Kayla used to expand the incision. A Veress needle was used to access the peritoneum.  CO2 gas was attached to the needle and opening pressure was less than 5 mmHg, and flow was increased to 20 L/min. Pneumoperitoneum was achieved with good tympany of the abdomen. A 5 mm trocar was used to place the first port. The 5 mm scope was placed in the port and visualized the abdomen which was free of any injury. Upper abdomen was explored with above findings.  Attention was turned to the uterus, ovaries, fallopian tubes, and lower abdominal walls. All appeared normal with no indication of endometriosis, cysts,  or fibroids. Two additional 5mm ports were placed in the LLQ under direct visualization after infiltration with local anesthetic. The right and left ureters were identified and noted to be far from the surgical sites. The left fallopian tube was elevated and the mesosalpinx divided with the Ligasure device from the fimbriated end toward the cornua. The fallopian tube was cauterized and transected 1cm from the cornua and removed from the abdomen. The same was then completed on the right. Surgical sites were observed and noted to be hemostatic.  The ports were removed and pneumoperitoneum was expelled. The skin incisions were closed using 3-0 monocryl and dermabond.      Instrument, sponge, and needle counts were correct times 2. The patient was extubated in the operating room and transferred to the PACU in stable condition.    Mandie Shook MD  OB/GYN  8/11/2022 1:51 PM

## 2022-08-11 NOTE — ANESTHESIA PROCEDURE NOTES
Airway         Procedure Start/Stop Times: 8/11/2022 1:21 PM  Staff -        CRNA: Soham Sloan APRN CRNA       Performed By: CRNA  Consent for Airway        Urgency: elective  Indications and Patient Condition       Indications for airway management: frieda-procedural       Induction type:intravenous       Mask difficulty assessment: 1 - vent by mask    Final Airway Details       Final airway type: endotracheal airway       Successful airway: ETT - single  Endotracheal Airway Details        ETT size (mm): 7.0       Cuffed: yes       Cuff volume (mL): 8       Successful intubation technique: direct laryngoscopy       DL Blade Type: Cain 2       Grade View of Cords: 1       Position: Center       Measured from: gums/teeth       Secured at (cm): 21       Bite block used: None    Post intubation assessment        Placement verified by: capnometry, equal breath sounds and chest rise        Number of attempts at approach: 1       Number of other approaches attempted: 0       Secured with: silk tape       Ease of procedure: easy       Dentition: Intact and Unchanged    Medication(s) Administered   Medication Administration Time: 8/11/2022 1:21 PM

## 2022-08-11 NOTE — ANESTHESIA POSTPROCEDURE EVALUATION
Patient: Bebe Carl    Procedure: Procedure(s):  SALPINGECTOMY, LAPAROSCOPIC       Anesthesia Type:  General    Note:  Disposition: Outpatient   Postop Pain Control: Uneventful            Sign Out: Well controlled pain   PONV: No   Neuro/Psych: Uneventful            Sign Out: Acceptable/Baseline neuro status   Airway/Respiratory: Uneventful            Sign Out: Acceptable/Baseline resp. status   CV/Hemodynamics: Uneventful            Sign Out: Acceptable CV status; No obvious hypovolemia; No obvious fluid overload   Other NRE: NONE   DID A NON-ROUTINE EVENT OCCUR? No           Last vitals:  Vitals Value Taken Time   BP 93/56 08/11/22 1435   Temp 97.7  F (36.5  C) 08/11/22 1430   Pulse 51 08/11/22 1437   Resp 18 08/11/22 1430   SpO2 100 % 08/11/22 1437   Vitals shown include unvalidated device data.    Electronically Signed By: BONNIE ARAGON CRNA  August 11, 2022  2:53 PM

## 2022-08-11 NOTE — OR NURSING
PACU Respiratory Event Documentation     1) Episodes of Apnea greater than or equal to 10 seconds: no    2) Bradypnea - less than 8 breaths per minute: no    3) Pain score on 0 to 10 scale: denies    4) Pain-sedation mismatch (yes or no): no    5) Repeated 02 desaturation less than 90% (yes or no): no    Anesthesia notified? (yes or no): no    Any of the above events occuring repeatedly in separate 30 minute intervals may be considered recurrent PACU respiratory events.      Louisa Knapp RN

## 2022-08-16 ENCOUNTER — PRENATAL OFFICE VISIT (OUTPATIENT)
Dept: OBGYN | Facility: OTHER | Age: 22
End: 2022-08-16
Attending: OBSTETRICS & GYNECOLOGY
Payer: COMMERCIAL

## 2022-08-16 VITALS
DIASTOLIC BLOOD PRESSURE: 60 MMHG | BODY MASS INDEX: 27.02 KG/M2 | WEIGHT: 144.2 LBS | HEART RATE: 76 BPM | SYSTOLIC BLOOD PRESSURE: 110 MMHG

## 2022-08-16 LAB
PATH REPORT.COMMENTS IMP SPEC: NORMAL
PATH REPORT.FINAL DX SPEC: NORMAL
PATH REPORT.RELEVANT HX SPEC: NORMAL
PHOTO IMAGE: NORMAL

## 2022-08-16 PROCEDURE — 99207 PR POST-PARTUM 6 WK VISIT - GICH ONLY: CPT | Performed by: OBSTETRICS & GYNECOLOGY

## 2022-08-16 ASSESSMENT — PATIENT HEALTH QUESTIONNAIRE - PHQ9
5. POOR APPETITE OR OVEREATING: NOT AT ALL
SUM OF ALL RESPONSES TO PHQ QUESTIONS 1-9: 0

## 2022-08-16 ASSESSMENT — PAIN SCALES - GENERAL: PAINLEVEL: MILD PAIN (2)

## 2022-08-16 ASSESSMENT — ANXIETY QUESTIONNAIRES
5. BEING SO RESTLESS THAT IT IS HARD TO SIT STILL: NOT AT ALL
6. BECOMING EASILY ANNOYED OR IRRITABLE: NOT AT ALL
2. NOT BEING ABLE TO STOP OR CONTROL WORRYING: NOT AT ALL
1. FEELING NERVOUS, ANXIOUS, OR ON EDGE: NOT AT ALL
GAD7 TOTAL SCORE: 0
7. FEELING AFRAID AS IF SOMETHING AWFUL MIGHT HAPPEN: NOT AT ALL
3. WORRYING TOO MUCH ABOUT DIFFERENT THINGS: NOT AT ALL
GAD7 TOTAL SCORE: 0

## 2022-08-16 NOTE — PROGRESS NOTES
6 week Postpartum Visit Note    S:  Ms. Bebe Carl is a 21 year old  here for her 6-week postpartum checkup.   - Had a  on 22.  - Infant gender:  boy, weight 8 pounds 11.6 oz.  - Feeding Method:  .  Complications reported with feeding:  none, infant thriving .    - Bleeding:  None.  Menses resumed:  No  - Bowel/Urinary problems:  No  - Mood: good  - Sleep: good. Waking baby up every 2.5 hours to feed baby.    [place] Depression Scale  Thoughts of Harming Self: Never  Total Score: 0      - Contraception Planned:  S/p tubal  - She  has not had intercourse since delivery..    - Current tobacco use:  No  - Hx of Abuse:  No  ================================================================  ROS: 10 point ROS neg other than the symptoms noted above in the HPI.     O:  /60 (BP Location: Right arm, Patient Position: Sitting, Cuff Size: Adult Regular)   Pulse 76   Wt 65.4 kg (144 lb 3.2 oz)   LMP 2021 (Exact Date)   Breastfeeding Yes   BMI 27.02 kg/m    Gen: Well-appearing, NAD  Psych:  Appropriate mood and affect  Breast: Deferred, no concerns  Abd:  Benign and Soft, flat, non-tender  Inc:   Laparoscopic incisions all healing well  Pelvic: Well healed perineum. Normal vagina and cervix. Uterus normal size, anteverted, nontender. No adnexal masses or tenderness    A/P:  Ms. Bebe Carl is a 21 year old  here for 6 week postpartum visit after . Doing well.  - Contraception: s/p laparoscopic bilateral salpingectomy  - Feeding: breast  - Follow-up: annually or sooner boogie Shook MD  OB/GYN  2022 3:27 PM

## 2022-08-16 NOTE — NURSING NOTE
"    Chief Complaint   Patient presents with     Postpartum Care     Surgical Followup     Tubal       Initial LMP 09/04/2021 (Exact Date)  Estimated body mass index is 27.74 kg/m  as calculated from the following:    Height as of 6/15/22: 1.556 m (5' 1.25\").    Weight as of 8/11/22: 67.1 kg (148 lb).  Medication Reconciliation: complete    Lalita Calzada RN  "

## 2022-08-26 ENCOUNTER — MEDICAL CORRESPONDENCE (OUTPATIENT)
Dept: HEALTH INFORMATION MANAGEMENT | Facility: OTHER | Age: 22
End: 2022-08-26

## 2022-11-07 ENCOUNTER — MEDICAL CORRESPONDENCE (OUTPATIENT)
Dept: HEALTH INFORMATION MANAGEMENT | Facility: OTHER | Age: 22
End: 2022-11-07

## 2023-03-10 ENCOUNTER — HOSPITAL ENCOUNTER (EMERGENCY)
Facility: OTHER | Age: 23
Discharge: HOME | End: 2023-03-10
Attending: INTERNAL MEDICINE | Admitting: INTERNAL MEDICINE
Payer: COMMERCIAL

## 2023-03-10 VITALS
BODY MASS INDEX: 26.8 KG/M2 | RESPIRATION RATE: 16 BRPM | OXYGEN SATURATION: 97 % | TEMPERATURE: 97.1 F | DIASTOLIC BLOOD PRESSURE: 70 MMHG | SYSTOLIC BLOOD PRESSURE: 116 MMHG | HEART RATE: 95 BPM | WEIGHT: 143 LBS

## 2023-03-10 DIAGNOSIS — S05.8X2A ABRASION OF SCLERA OF LEFT EYE, INITIAL ENCOUNTER: Primary | ICD-10-CM

## 2023-03-10 PROCEDURE — 250N000009 HC RX 250: Performed by: INTERNAL MEDICINE

## 2023-03-10 PROCEDURE — 99283 EMERGENCY DEPT VISIT LOW MDM: CPT | Performed by: INTERNAL MEDICINE

## 2023-03-10 RX ORDER — BACITRACIN ZINC AND POLYMYXIN B SULFATES 500; 10000 [USP'U]/G; [USP'U]/G
0.1 OINTMENT OPHTHALMIC 3 TIMES DAILY
Qty: 3.5 G | Refills: 0 | Status: ON HOLD | OUTPATIENT
Start: 2023-03-10 | End: 2023-11-15

## 2023-03-10 RX ORDER — TETRACAINE HYDROCHLORIDE 5 MG/ML
1-2 SOLUTION OPHTHALMIC ONCE
Status: COMPLETED | OUTPATIENT
Start: 2023-03-10 | End: 2023-03-10

## 2023-03-10 RX ADMIN — TETRACAINE HYDROCHLORIDE 2 DROP: 5 SOLUTION OPHTHALMIC at 22:24

## 2023-03-10 RX ADMIN — FLUORESCEIN SODIUM 1 STRIP: 1 STRIP OPHTHALMIC at 22:23

## 2023-03-11 NOTE — DISCHARGE INSTRUCTIONS
Scleral abrasion... closest information for home care provided.     Abrasion of sclera of left eye, initial encounter  START:   - bacitracin-Polymyxin B (POLYCIN) OINT; Place 0.1 g Into the left eye 3 times daily      Return as needed for follow-up for new / worsening symptoms.

## 2023-03-11 NOTE — ED PROVIDER NOTES
Emergency Department Provider Note  : 2000 Age: 22 year old Sex: female MRN: [id number]    Chief Complaint   Patient presents with     Foreign Body in Eye       Medical Decision Making / Assessment / Plan   22 year old female presenting with scleral abrasion of left eye    ED Course as of 03/10/23 2223   Fri Mar 10, 2023   2213 Patient evaluated.  No obvious contact noted with Woods lamp or regular light.  Subsequently evaluated with tetracaine and fluorescein strip.  Found to have scleral abrasion involving the lower medial aspect of her left eye.  No foreign body or contact lens noted.    Patient discharged home in stable condition with prescription for bacitracin-polymyxin B eye ointment   2222 Follow-up as needed for new or worsening symptoms.        The patient was informed of the plan and verbalized understanding and agreed with the plan. The patient was given strict return to Emergency Department precautions as well as appropriate follow up instructions. The patient was discharged in stable condition.    New Prescriptions    BACITRACIN-POLYMYXIN B (POLYCIN) OINT    Place 0.1 g Into the left eye 3 times daily       Final diagnoses:   Abrasion of sclera of left eye, initial encounter       Vincent Boyd MD  3/10/2023   Emergency Department    Subjective   Bebe is a 22 year old female who presents at  9:48 PM with concern for foreign body in left eye.  She placed her contact in her eye and about a half an hour later was not able to find her contact and felt like she still had her contact in her eye.  She has a sensation of contact being in her left inner lower orbital area.  Presents tonight for evaluation due to concerns that her contact is stuck in her eye.  No other concerns.    I have reviewed the Medications, Allergies, Past Medical and Surgical History, and Social History in the [employer] [facility] and with family.    Review of Systems:  Please see Subjective / HPI for pertinent positives and  negatives. All other systems reviewed and found to be negative.      Objective     Patient Vitals for the past 24 hrs:   BP Temp Pulse Resp SpO2 Weight   03/10/23 2050 116/70 97.1  F (36.2  C) 95 16 97 % 64.9 kg (143 lb)       Physical Exam:     General: Awake, alert, in no acute resp follow-up as needed for new or worsening symptoms.  Iratory distress.  Head: Normocephalic, atraumatic.  Eyes: Conjugate gaze.  No obvious contact noted with UV light or white light.  Subsequently with fluorescein strip and tetracaine, using Woods lamp, left eye was evaluated.  Found to have scleral abrasion in the medial inferior aspect of the left thigh.  No corneal abrasion.  No obvious foreign body.  No contact lens noted.    Procedures / Critical Care   Procedures    Aggregate Critical Care Time: None.     No orders of the defined types were placed in this encounter.      RESULTS: As noted above.          Medical/Surgical History:  Past Medical History:   Diagnosis Date     Acute gastroenteritis 12/24/2019     ADHD (attention deficit hyperactivity disorder)      Anemia     following delivery      Bipolar 1 disorder (H)      Depression      Depressive disorder      History of blood transfusion      Postpartum depression      Viral gastroenteritis 12/24/2019     Past Surgical History:   Procedure Laterality Date     APPENDECTOMY       LAPAROSCOPIC SALPINGECTOMY Bilateral 8/11/2022    Procedure: SALPINGECTOMY, LAPAROSCOPIC;  Surgeon: Mandie Shook MD;  Location:  OR       Medications:  Current Facility-Administered Medications   Medication     fluorescein (FUL-CLAY) ophthalmic strip 1 strip     tetracaine (PONTOCAINE) 0.5 % ophthalmic solution 1-2 drop     Current Outpatient Medications   Medication     bacitracin-Polymyxin B (POLYCIN) OINT     acetaminophen (TYLENOL) 325 MG tablet     acetaminophen (TYLENOL) 500 MG tablet     ibuprofen (ADVIL/MOTRIN) 800 MG tablet     oxyCODONE (ROXICODONE) 5 MG tablet       Allergies:  Sulfa  drugs, Atomoxetine, and Latex    Relevant labs, images, EKGs, Epic and outside hospital (if applicable) charts were reviewed. The findings, diagnosis, plan, and need for follow up were discussed with the patient/family. Nursing notes were reviewed.      Vincent Boyd MD  03/10/23 5821

## 2023-03-11 NOTE — ED TRIAGE NOTES
Pt arrives with contact lense stuck in left eye, was placed in eye at 1230 and at 1300 pt was not able to find lense. Pt states that she has been trying to get it out with her finger and states that she can still feel it in her eye. Upon assessment, writer was not able to visualize contact, writer did flush eye with 10 mls of normal saline with some relief per pt.      Triage Assessment     Row Name 03/10/23 2051       Triage Assessment (Adult)    Airway WDL WDL       Respiratory WDL    Respiratory WDL WDL       Skin Circulation/Temperature WDL    Skin Circulation/Temperature WDL WDL       Cardiac WDL    Cardiac WDL WDL       Peripheral/Neurovascular WDL    Peripheral Neurovascular WDL WDL       Cognitive/Neuro/Behavioral WDL    Cognitive/Neuro/Behavioral WDL WDL

## 2023-07-11 ENCOUNTER — OFFICE VISIT (OUTPATIENT)
Dept: FAMILY MEDICINE | Facility: OTHER | Age: 23
End: 2023-07-11
Payer: MEDICAID

## 2023-07-11 VITALS
BODY MASS INDEX: 26.18 KG/M2 | HEART RATE: 79 BPM | WEIGHT: 139.7 LBS | OXYGEN SATURATION: 99 % | DIASTOLIC BLOOD PRESSURE: 68 MMHG | TEMPERATURE: 96.8 F | RESPIRATION RATE: 16 BRPM | SYSTOLIC BLOOD PRESSURE: 112 MMHG

## 2023-07-11 DIAGNOSIS — N30.00 ACUTE CYSTITIS WITHOUT HEMATURIA: ICD-10-CM

## 2023-07-11 DIAGNOSIS — Z11.3 SCREEN FOR STD (SEXUALLY TRANSMITTED DISEASE): Primary | ICD-10-CM

## 2023-07-11 LAB
ALBUMIN UR-MCNC: 50 MG/DL
APPEARANCE UR: ABNORMAL
BACTERIA #/AREA URNS HPF: ABNORMAL /HPF
BILIRUB UR QL STRIP: NEGATIVE
C TRACH DNA SPEC QL PROBE+SIG AMP: NEGATIVE
COLOR UR AUTO: YELLOW
GLUCOSE UR STRIP-MCNC: NEGATIVE MG/DL
HGB UR QL STRIP: NEGATIVE
HYALINE CASTS: 8 /LPF
KETONES UR STRIP-MCNC: ABNORMAL MG/DL
LEUKOCYTE ESTERASE UR QL STRIP: ABNORMAL
MUCOUS THREADS #/AREA URNS LPF: PRESENT /LPF
N GONORRHOEA DNA SPEC QL NAA+PROBE: NEGATIVE
NITRATE UR QL: NEGATIVE
PH UR STRIP: 6 [PH] (ref 5–9)
RBC URINE: 6 /HPF
SP GR UR STRIP: 1.03 (ref 1–1.03)
SQUAMOUS EPITHELIAL: 22 /HPF
UROBILINOGEN UR STRIP-MCNC: NORMAL MG/DL
WBC URINE: 35 /HPF

## 2023-07-11 PROCEDURE — G0463 HOSPITAL OUTPT CLINIC VISIT: HCPCS

## 2023-07-11 PROCEDURE — 86780 TREPONEMA PALLIDUM: CPT | Mod: ZL

## 2023-07-11 PROCEDURE — 87086 URINE CULTURE/COLONY COUNT: CPT | Mod: ZL

## 2023-07-11 PROCEDURE — 87491 CHLMYD TRACH DNA AMP PROBE: CPT | Mod: ZL

## 2023-07-11 PROCEDURE — 81001 URINALYSIS AUTO W/SCOPE: CPT | Mod: ZL

## 2023-07-11 PROCEDURE — 87389 HIV-1 AG W/HIV-1&-2 AB AG IA: CPT | Mod: ZL

## 2023-07-11 PROCEDURE — 86803 HEPATITIS C AB TEST: CPT | Mod: ZL

## 2023-07-11 PROCEDURE — 99214 OFFICE O/P EST MOD 30 MIN: CPT

## 2023-07-11 PROCEDURE — 36415 COLL VENOUS BLD VENIPUNCTURE: CPT | Mod: ZL

## 2023-07-11 PROCEDURE — 87591 N.GONORRHOEAE DNA AMP PROB: CPT | Mod: ZL

## 2023-07-11 RX ORDER — SERTRALINE HYDROCHLORIDE 25 MG/1
25 TABLET, FILM COATED ORAL EVERY MORNING
COMMUNITY
Start: 2023-06-20

## 2023-07-11 RX ORDER — NITROFURANTOIN 25; 75 MG/1; MG/1
100 CAPSULE ORAL 2 TIMES DAILY
Qty: 10 CAPSULE | Refills: 0 | Status: SHIPPED | OUTPATIENT
Start: 2023-07-11 | End: 2023-07-12

## 2023-07-11 RX ORDER — DEXTROAMPHETAMINE SACCHARATE, AMPHETAMINE ASPARTATE, DEXTROAMPHETAMINE SULFATE AND AMPHETAMINE SULFATE 3.75; 3.75; 3.75; 3.75 MG/1; MG/1; MG/1; MG/1
30 TABLET ORAL EVERY MORNING
COMMUNITY
Start: 2023-06-20

## 2023-07-11 ASSESSMENT — PAIN SCALES - GENERAL: PAINLEVEL: NO PAIN (0)

## 2023-07-11 NOTE — NURSING NOTE
Pt presents to clinic today for a possible UTI and STD check.   NOT HAVING ANY std SYMPTOMS JUST WANTS TO BE CHECKED FOR MAINTENANCE.   FOOD SECURITY SCREENING QUESTIONS:    The next two questions are to help us understand your food security.  If you are feeling you need any assistance in this area, we have resources available to support you today.    Hunger Vital Signs:  Within the past 12 months we worried whether our food would run out before we got money to buy more. Never  Within the past 12 months the food we bought just didn't last and we didn't have money to get more. Never            Medication Reconciliation: El Whelan LPN,LPN on 7/11/2023 at 4:06 PM

## 2023-07-11 NOTE — PROGRESS NOTES
ASSESSMENT/PLAN:    (Z11.3) Screen for STD (sexually transmitted disease)  (primary encounter diagnosis)  Comment: Patient presents for dysuria that she attributes to to a UTI.  She does note that she recently had a new sexual partner and the condom broke 1 time.  She requests that she be screened for all sexually transmitted diseases aside from HSV.  At this time the results are pending aside from a negative chlamydia and gonorrhea.  Plan: GC/Chlamydia by PCR, HIV Antigen Antibody         Combo, Hepatitis C Screen Reflex to HCV RNA         Quant and Genotype, Treponema Ab w Reflex to         RPR and Titer, Trichomonas vaginalis by PCR,       (N30.00) Acute cystitis without hematuria  Comment: Patient with a 4-day history of dysuria.  She denies any fever chills nausea or vomiting.  On exam she is afebrile and does not have any suprapubic or CVA tenderness.  Urinalysis shows a moderate amount of leukocyte Estrace.  Nitrates are negative.  She does have some STD testing that is also pending.  Chlamydia and gonorrhea were negative.  Vaginal multiplex declined.  I recommend she start treatment with antibiotics at this point, culture is pending and we will follow-up with results.  She was unable to void initially the beginning of appointment.  She is here with her and her child so she did leave before UA results were back.  We will call with results.  Plan: UA Macroscopic with reflex to Microscopic and         Culture, Urine Culture, nitroFURantoin         macrocrystal-monohydrate (MACROBID) 100 MG         capsule  Vitals stable. PE available for review below. UA results: Below. Based off UA results, patient does meet criteria for antibiotic therapy. I recommend alternating tylenol and ibuprofen every 4-6 hours as needed, warm heating pad, pushing fluids/hydration, cranberry juice/pills, urinating when urge arises. May also try over the counter remedies such as Azo (as long as pyridium not already prescribed). Patient  "aware that if they do take Azo, that this medication can change color of urine to an \"orange\" color. If fevers, chills, flank pain/back pain, inability to urinate/struggle to urinate, signs of dehydration or other worrisome signs occur, patient agreeable to follow up for reevaluation. Patient is in agreement and understanding of the above treatment plan. All questions and concerns were addressed and answered to patient's satisfaction. AVS reviewed with patient.     Discussed warning signs/symptoms indicative of need to f/u    Follow up if symptoms persist or worsen or concerns    I have reviewed the nursing notes.  I have reviewed the findings, diagnosis, plan and need for follow up with the patient.    I explained my diagnostic considerations and recommendations to the patient, who voiced understanding and agreement with the treatment plan. All questions were answered. We discussed potential side effects of any prescribed or recommended therapies, as well as expectations for response to treatments.    BONNIE SILVEIRA CNP  7/11/2023  4:15 PM    HPI:    Bebe Carl is a 22 year old female  who presents to [facility] today for concerns of UTI.    Patient also has concerns for STD screening.  She reports aside from the dysuria she has no symptoms, no vaginal discharge pain or itching, no concerns.  She reports that she had a new sexual partner and the condom broke so she would like routine STD screening.    possible UTI, x 4 day    Symptoms: dysuria, frequency  No back or side pain  No hematuria/blood in urine  Last Urination: Today  YES: she is  able to completely void/empty  No fevers, chills, nausea, vomiting  No change in bowel habits (diarrhea, constipation, etc.)  No vaginal/penile symptoms (discharge, pain, itching, sores, etc.)  No exposures to STIs/STDs  No recent swimming/use of hot tubs/swimming pools/lakes    YES: she has had a history of UTIs    LMP: 1 month ago.      Treatments tried: " NA    Denies CP, SOB, calf tenderness. No new medications.     Allergies: sulfa and latex    PCP: None      Past Medical History:   Diagnosis Date     Acute gastroenteritis 12/24/2019     ADHD (attention deficit hyperactivity disorder)      Anemia     following delivery      Bipolar 1 disorder (H)      Depression      Depressive disorder      History of blood transfusion      Postpartum depression      Viral gastroenteritis 12/24/2019     Past Surgical History:   Procedure Laterality Date     APPENDECTOMY       LAPAROSCOPIC SALPINGECTOMY Bilateral 8/11/2022    Procedure: SALPINGECTOMY, LAPAROSCOPIC;  Surgeon: Mandie Shook MD;  Location:  OR     Social History     Tobacco Use     Smoking status: Former     Packs/day: 1.00     Types: Cigarettes     Smokeless tobacco: Never   Substance Use Topics     Alcohol use: No     Current Outpatient Medications   Medication Sig Dispense Refill     acetaminophen (TYLENOL) 325 MG tablet Take 3 tablets (975 mg) by mouth every 6 hours as needed for mild pain 50 tablet 0     acetaminophen (TYLENOL) 500 MG tablet Take 1,000 mg by mouth every 6 hours as needed for mild pain       amphetamine-dextroamphetamine (ADDERALL) 15 MG tablet Take 15 mg by mouth every morning       bacitracin-Polymyxin B (POLYCIN) OINT Place 0.1 g Into the left eye 3 times daily 3.5 g 0     ibuprofen (ADVIL/MOTRIN) 800 MG tablet Take 1 tablet (800 mg) by mouth every 6 hours as needed for other (mild and/or inflammatory pain) 30 tablet 0     nitroFURantoin macrocrystal-monohydrate (MACROBID) 100 MG capsule Take 1 capsule (100 mg) by mouth 2 times daily for 5 days 10 capsule 0     oxyCODONE (ROXICODONE) 5 MG tablet Take 1-2 tablets (5-10 mg) by mouth every 4 hours as needed for moderate to severe pain 20 tablet 0     sertraline (ZOLOFT) 25 MG tablet Take 25 mg by mouth every morning       Allergies   Allergen Reactions     Sulfa Antibiotics Hives and Anaphylaxis     Atomoxetine GI Disturbance     Weight  loss     Latex Rash     bandaides only     Past medical history, past surgical history, current medications and allergies reviewed and accurate to the best of my knowledge.      ROS:  Refer to HPI    /68   Pulse 79   Temp 96.8  F (36  C) (Tympanic)   Resp 16   Wt 63.4 kg (139 lb 11.2 oz)   LMP 06/13/2023 (Approximate)   SpO2 99%   Breastfeeding No   BMI 26.18 kg/m      EXAM:  General Appearance: Well appearing 22 year old female, appropriate appearance for age. No acute distress   Eyes: conjunctivae normal without erythema or irritation, corneas clear, no drainage or crusting, no eyelid swelling, pupils equal   Oropharynx: moist mucous membranes, no post nasal drip seen, no trismus, voice clear.    Sinuses:  No sinus tenderness upon palpation of the frontal or maxillary sinuses  Nose:  Bilateral nares: no erythema, no edema, no drainage or congestion   Neck: supple without adenopathy  Respiratory: normal chest wall and respirations.  Normal effort.  Clear to auscultation bilaterally, no wheezing, crackles or rhonchi.  No increased work of breathing.  No cough appreciated.  Cardiac: RRR with no murmurs  Abdomen: soft, nontender, no rigidity, no rebound tenderness or guarding, normal bowel sounds present  :  No suprapubic tenderness to palpation.  Absent CVA tenderness to palpation.    Musculoskeletal:  Equal movement of bilateral upper extremities.  Equal movement of bilateral lower extremities.  Normal gait.    Dermatological: no rashes noted of exposed skin  Neuro: Alert and oriented to person, place, and time.  Cranial nerves II-XII grossly intact with no focal or lateralizing deficits.  Muscle tone normal.  Gait normal. No tremor.   Psychological: normal affect, alert, oriented, and pleasant.     Labs:  Results for orders placed or performed in visit on 07/11/23   UA Macroscopic with reflex to Microscopic and Culture     Status: Abnormal    Specimen: Urine, Midstream   Result Value Ref Range     Color Urine Yellow Colorless, Straw, Light Yellow, Yellow    Appearance Urine Slightly Cloudy (A) Clear    Glucose Urine Negative Negative mg/dL    Bilirubin Urine Negative Negative    Ketones Urine Trace (A) Negative mg/dL    Specific Gravity Urine 1.029 1.000 - 1.030    Blood Urine Negative Negative    pH Urine 6.0 5.0 - 9.0    Protein Albumin Urine 50 (A) Negative mg/dL    Urobilinogen Urine Normal Normal, 2.0 mg/dL    Nitrite Urine Negative Negative    Leukocyte Esterase Urine Moderate (A) Negative    Bacteria Urine Few (A) None Seen /HPF    Mucus Urine Present (A) None Seen /LPF    RBC Urine 6 (H) <=2 /HPF    WBC Urine 35 (H) <=5 /HPF    Squamous Epithelials Urine 22 (H) <=1 /HPF    Hyaline Casts Urine 8 (H) <=2 /LPF    Narrative    Urine Culture ordered based on laboratory criteria   GC/Chlamydia by PCR     Status: Normal    Specimen: Urine, Voided   Result Value Ref Range    Chlamydia Trachomatis Negative Negative    Neisseria gonorrhoeae Negative Negative    Narrative    Assay performed using [employer] real-time, reverse-transcriptase PCR.

## 2023-07-12 ENCOUNTER — TELEPHONE (OUTPATIENT)
Dept: FAMILY MEDICINE | Facility: OTHER | Age: 23
End: 2023-07-12
Payer: MEDICAID

## 2023-07-12 DIAGNOSIS — N30.00 ACUTE CYSTITIS WITHOUT HEMATURIA: ICD-10-CM

## 2023-07-12 LAB — T PALLIDUM AB SER QL: NONREACTIVE

## 2023-07-12 RX ORDER — NITROFURANTOIN 25; 75 MG/1; MG/1
100 CAPSULE ORAL 2 TIMES DAILY
Qty: 10 CAPSULE | Refills: 0 | Status: SHIPPED | OUTPATIENT
Start: 2023-07-12 | End: 2023-10-02

## 2023-07-12 NOTE — TELEPHONE ENCOUNTER
Reason for call: Request for results.    Name of test or procedure: UTI and STD    Date of test or procedure: 7/11/2023    Location of test or procedure: [facility]    Preferred method for responding to this message: Telephone Call    Phone number patient can be reached at: Cell number on file:    Telephone Information:   Mobile [phone]

## 2023-07-13 LAB
BACTERIA UR CULT: NORMAL
HCV AB SERPL QL IA: NONREACTIVE
HIV 1+2 AB+HIV1 P24 AG SERPL QL IA: NONREACTIVE

## 2023-07-17 ENCOUNTER — TELEPHONE (OUTPATIENT)
Dept: FAMILY MEDICINE | Facility: OTHER | Age: 23
End: 2023-07-17
Payer: MEDICAID

## 2023-09-28 ENCOUNTER — OFFICE VISIT (OUTPATIENT)
Dept: FAMILY MEDICINE | Facility: OTHER | Age: 23
End: 2023-09-28
Attending: NURSE PRACTITIONER
Payer: COMMERCIAL

## 2023-09-28 VITALS
HEART RATE: 110 BPM | HEIGHT: 62 IN | TEMPERATURE: 98.3 F | DIASTOLIC BLOOD PRESSURE: 80 MMHG | RESPIRATION RATE: 20 BRPM | OXYGEN SATURATION: 98 % | WEIGHT: 145.4 LBS | SYSTOLIC BLOOD PRESSURE: 120 MMHG | BODY MASS INDEX: 26.76 KG/M2

## 2023-09-28 DIAGNOSIS — R68.83 CHILLS: ICD-10-CM

## 2023-09-28 DIAGNOSIS — R51.9 ACUTE NONINTRACTABLE HEADACHE, UNSPECIFIED HEADACHE TYPE: ICD-10-CM

## 2023-09-28 DIAGNOSIS — Z11.3 SCREEN FOR STD (SEXUALLY TRANSMITTED DISEASE): Primary | ICD-10-CM

## 2023-09-28 LAB
C TRACH DNA SPEC QL PROBE+SIG AMP: NEGATIVE
HCG UR QL: NEGATIVE
N GONORRHOEA DNA SPEC QL NAA+PROBE: NEGATIVE
SARS-COV-2 RNA RESP QL NAA+PROBE: NEGATIVE

## 2023-09-28 PROCEDURE — 81025 URINE PREGNANCY TEST: CPT | Mod: ZL | Performed by: NURSE PRACTITIONER

## 2023-09-28 PROCEDURE — 87591 N.GONORRHOEAE DNA AMP PROB: CPT | Mod: ZL | Performed by: NURSE PRACTITIONER

## 2023-09-28 PROCEDURE — 99213 OFFICE O/P EST LOW 20 MIN: CPT | Performed by: NURSE PRACTITIONER

## 2023-09-28 PROCEDURE — 87491 CHLMYD TRACH DNA AMP PROBE: CPT | Mod: ZL | Performed by: NURSE PRACTITIONER

## 2023-09-28 PROCEDURE — G0463 HOSPITAL OUTPT CLINIC VISIT: HCPCS

## 2023-09-28 PROCEDURE — C9803 HOPD COVID-19 SPEC COLLECT: HCPCS | Performed by: NURSE PRACTITIONER

## 2023-09-28 PROCEDURE — 87635 SARS-COV-2 COVID-19 AMP PRB: CPT | Mod: ZL | Performed by: NURSE PRACTITIONER

## 2023-09-28 NOTE — NURSING NOTE
"Chief Complaint   Patient presents with    Fever     Light headed, dizzy, [place]         Initial /80 (BP Location: Left arm, Patient Position: Sitting, Cuff Size: Adult Regular)   Pulse 110   Temp 98.3  F (36.8  C) (Temporal)   Resp 20   Ht 1.568 m (5' 1.75\")   Wt 66 kg (145 lb 6.4 oz)   LMP 09/28/2023   SpO2 98%   BMI 26.81 kg/m   Estimated body mass index is 26.81 kg/m  as calculated from the following:    Height as of this encounter: 1.568 m (5' 1.75\").    Weight as of this encounter: 66 kg (145 lb 6.4 oz).     Advance Care Directive on file? no  Advance Care Directive provided to patient? no    FOOD SECURITY SCREENING QUESTIONS:    The next two questions are to help us understand your food security.  If you are feeling you need any assistance in this area, we have resources available to support you today.    Hunger Vital Signs:  Within the past 12 months we worried whether our food would run out before we got money to buy more. Never  Within the past 12 months the food we bought just didn't last and we didn't have money to get more. Never  Pratima Reza LPN on 9/28/2023 at 2:28 PM      Pratima Reza     "

## 2023-09-28 NOTE — PROGRESS NOTES
ASSESSMENT/PLAN:    I have reviewed the nursing notes.  I have reviewed the findings, diagnosis, plan and need for follow up with the patient.    1. Screen for STD (sexually transmitted disease)  - GC/Chlamydia by PCR  - Pregnancy, Urine (HCG)  Negative tests no treatment is indicated.     2. Acute nonintractable headache, unspecified headache type  3. Chills  - Symptomatic COVID-19 Virus (Coronavirus) by PCR Nose  Negative covid test     Discussed warning signs/symptoms indicative of need to f/u    Follow up if symptoms persist or worsen or concerns    I explained my diagnostic considerations and recommendations to the patient, who voiced understanding and agreement with the treatment plan. All questions were answered. We discussed potential side effects of any prescribed or recommended therapies, as well as expectations for response to treatments.    Maddy Jaffe NP  9/28/2023  2:30 PM    HPI:  Bebe Carl is a 22 year old female  who presents to [facility] today for concerns of fever, lightheaded, nausea with some dizziness. She has not taken a covid test. Symptoms about 1 week ago, but fever started yesterday. Has had some chills with it. No cough, chest pain , or shortness of breath. Does not feel anxious. Ok with covid test. No known exposures.     Her biggest concern is possibly pregnancy causing her symptoms and she wants pregnancy test today.     Has 3 year old and 1 year old. Does not think pregnancy but would like std and pregnancy testing.    No STD symptoms.     ROS otherwise negative.    Past Medical History:   Diagnosis Date    Acute gastroenteritis 12/24/2019    ADHD (attention deficit hyperactivity disorder)     Anemia     following delivery     Bipolar 1 disorder (H)     Depression     Depressive disorder     History of blood transfusion     Postpartum depression     Viral gastroenteritis 12/24/2019     Past Surgical History:   Procedure Laterality Date    APPENDECTOMY      LAPAROSCOPIC  "SALPINGECTOMY Bilateral 8/11/2022    Procedure: SALPINGECTOMY, LAPAROSCOPIC;  Surgeon: Mandie Shook MD;  Location:  OR     Social History     Tobacco Use    Smoking status: Former     Packs/day: 1.00     Types: Cigarettes    Smokeless tobacco: Never   Substance Use Topics    Alcohol use: No     Current Outpatient Medications   Medication Sig Dispense Refill    acetaminophen (TYLENOL) 325 MG tablet Take 3 tablets (975 mg) by mouth every 6 hours as needed for mild pain 50 tablet 0    acetaminophen (TYLENOL) 500 MG tablet Take 1,000 mg by mouth every 6 hours as needed for mild pain      amphetamine-dextroamphetamine (ADDERALL) 15 MG tablet Take 15 mg by mouth every morning      bacitracin-Polymyxin B (POLYCIN) OINT Place 0.1 g Into the left eye 3 times daily 3.5 g 0    ibuprofen (ADVIL/MOTRIN) 800 MG tablet Take 1 tablet (800 mg) by mouth every 6 hours as needed for other (mild and/or inflammatory pain) 30 tablet 0    nitroFURantoin macrocrystal-monohydrate (MACROBID) 100 MG capsule Take 1 capsule (100 mg) by mouth 2 times daily 10 capsule 0    sertraline (ZOLOFT) 25 MG tablet Take 25 mg by mouth every morning       Allergies   Allergen Reactions    Sulfa Antibiotics Hives and Anaphylaxis    Atomoxetine GI Disturbance     Weight loss    Latex Rash     bandaides only     Past medical history, past surgical history, current medications and allergies reviewed and accurate to the best of my knowledge.      ROS:  Refer to HPI    /80 (BP Location: Left arm, Patient Position: Sitting, Cuff Size: Adult Regular)   Pulse 110   Temp 98.3  F (36.8  C) (Temporal)   Resp 20   Ht 1.568 m (5' 1.75\")   Wt 66 kg (145 lb 6.4 oz)   LMP 09/28/2023   SpO2 98%   BMI 26.81 kg/m      EXAM:  General Appearance: Well appearing 22 year old female, appropriate appearance for age. No acute distress   Respiratory: normal chest wall and respirations.  Normal effort.  Clear to auscultation bilaterally, no wheezing, crackles or " rhonchi.  No increased work of breathing.  No cough appreciated.  Cardiac: RRR with no murmurs  Musculoskeletal:  Equal movement of bilateral upper extremities.  Equal movement of bilateral lower extremities.  Normal gait.    Neuro: Alert and oriented to person, place, and time.    Psychological: normal affect, alert, oriented, and pleasant.     Results for orders placed or performed in visit on 09/28/23   Pregnancy, Urine (HCG)     Status: Normal   Result Value Ref Range    hCG Urine Qualitative Negative Negative   Symptomatic COVID-19 Virus (Coronavirus) by PCR Nose     Status: Normal    Specimen: Nose; Swab   Result Value Ref Range    SARS CoV2 PCR Negative Negative    Narrative    Testing was performed using the Xpert Xpress SARS-CoV-2 Assay on the C[employer] Gene-Xpert Instrument Systems. Additional information about this Emergency Use Authorization (EUA) assay can be found via the Lab Guide. This test should be ordered for the detection of SARS-CoV-2 in individuals who meet SARS-CoV-2 clinical and/or epidemiological criteria as well as from individuals without symptoms or other reasons to suspect COVID-19. Test performance for asymptomatic patients has only been established in anterior nasal swab specimens. This test is for in vitro diagnostic use under the FDA EUA for laboratories certified under CLIA to perform high complexity testing. This test has not been FDA cleared or approved. A negative result does not rule out the presence of PCR inhibitors in the specimen or target RNA concentration below the limit of detection for the assay. The possibility of a false negative should be considered if the patient's recent exposure or clinical presentation suggests COVID-19. This test was validated by [facility]. This laboratory is certified under the Clinical Laboratory Improvement Amendments (CLIA) as qualified to perform high complexity clinical laboratory testing.    GC/Chlamydia by PCR     Status: Normal    Specimen: Urine, Voided   Result Value Ref Range    Chlamydia Trachomatis Negative Negative    Neisseria gonorrhoeae Negative Negative    Narrative    Assay performed using [employer] real-time, reverse-transcriptase PCR.

## 2023-10-02 ENCOUNTER — OFFICE VISIT (OUTPATIENT)
Dept: OBGYN | Facility: OTHER | Age: 23
End: 2023-10-02
Payer: COMMERCIAL

## 2023-10-02 VITALS
SYSTOLIC BLOOD PRESSURE: 110 MMHG | DIASTOLIC BLOOD PRESSURE: 72 MMHG | BODY MASS INDEX: 26.92 KG/M2 | HEART RATE: 84 BPM | WEIGHT: 146 LBS

## 2023-10-02 DIAGNOSIS — R10.2 PELVIC PAIN IN FEMALE: ICD-10-CM

## 2023-10-02 DIAGNOSIS — N93.9 ABNORMAL UTERINE BLEEDING (AUB): Primary | ICD-10-CM

## 2023-10-02 LAB — TSH SERPL DL<=0.005 MIU/L-ACNC: 1.27 UIU/ML (ref 0.3–4.2)

## 2023-10-02 PROCEDURE — G0463 HOSPITAL OUTPT CLINIC VISIT: HCPCS

## 2023-10-02 PROCEDURE — 36415 COLL VENOUS BLD VENIPUNCTURE: CPT | Mod: ZL

## 2023-10-02 PROCEDURE — 99213 OFFICE O/P EST LOW 20 MIN: CPT

## 2023-10-02 PROCEDURE — 84443 ASSAY THYROID STIM HORMONE: CPT | Mod: ZL

## 2023-10-02 RX ORDER — LAMOTRIGINE 25 MG/1
TABLET ORAL DAILY
COMMUNITY
Start: 2023-09-11

## 2023-10-02 NOTE — PROGRESS NOTES
CC:heavy bleeding and cramping since tubal.   HPI:  Debbie is a 22 year old female who presents for heavy bleeding and cramping since tubal. She notes that she has constant cramping that does not correlate with her cycle. She reports that she had the cramping all over in her pelvic area and it is not localized. She has tried tylenol and ibuprofen and it does not help. She was recently seen in the [facility] clinic and was noted to be STI negative. She denies vaginal concerns. She denies urinary concerns. She reports that her menses come monthly. She states they are much heavier then they used to be. She reports that she does not have clots with this. She states that she goes through a super tampon per hour and still leaks through. She is not on any birth control with tubal.   Patient's last menstrual period was 2023 (exact date).  Pap Smears:UTD  Mammograms:NA    OB History    Para Term  AB Living   2 2 2 0 0 2   SAB IAB Ectopic Multiple Live Births   0 0 0 0 2      # Outcome Date GA Lbr Alistair/2nd Weight Sex Delivery Anes PTL Lv   2 Term 22 39w2d 02:34 / 01:26 3.958 kg (8 lb 11.6 oz) M Vag-Spont EPI N SARAH      Name: Ruben ALBA      Apgar1: 8  Apgar5: 9   1 Term 20 39w6d 11:38 / 03:00 4.065 kg (8 lb 15.4 oz) F Vag-Spont EPI, IV, Local N SARAH      Complications: Other Excessive Bleeding      Name: QIANA ALBA-DEBBIE      Apgar1: 9  Apgar5: 9      Obstetric Comments   Positive SMA carrier    Negative CF      Past Medical History:   Diagnosis Date    Acute gastroenteritis 2019    ADHD (attention deficit hyperactivity disorder)     Anemia     following delivery     Bipolar 1 disorder (H)     Depression     Depressive disorder     History of blood transfusion     Postpartum depression     Viral gastroenteritis 2019       Current Outpatient Medications   Medication    acetaminophen (TYLENOL) 325 MG tablet    acetaminophen (TYLENOL) 500 MG tablet    amphetamine-dextroamphetamine  (ADDERALL) 15 MG tablet    bacitracin-Polymyxin B (POLYCIN) OINT    ibuprofen (ADVIL/MOTRIN) 800 MG tablet    lamoTRIgine (LAMICTAL) 25 MG tablet    sertraline (ZOLOFT) 25 MG tablet     No current facility-administered medications for this visit.     Allergies   Allergen Reactions    Sulfa Antibiotics Hives and Anaphylaxis    Atomoxetine GI Disturbance     Weight loss    Latex Rash     bandaides only     /72   Pulse 84   Wt 66.2 kg (146 lb)   LMP 09/23/2023 (Exact Date)   BMI 26.92 kg/m      REVIEW OF SYSTEMS  As Per HPI, Otherwise negative.     Exam:  Constitutional: healthy, alert, and no distress  Pelvic: Normal BUSE, vulva appears normal, vaginal and cervix are normal. Uterus is normal size, shape position and mobility without adnexal mass. She notes diffuse tenderness with exam Chaperone was present.         Lab: No results found for any visits on 10/02/23.    ASSESSMENT/PLAN :  (N93.9) Abnormal uterine bleeding (AUB)  (primary encounter diagnosis)  (R10.2) Pelvic pain in female  Plan: US Pelvic Complete with Transvaginal, TSH         Reflex [facility]        Discussed this could be her normal menses now following her baby one year ago. We will evaluate her TSH to ensure no change has occurred with that. We will also have a pelvic US completed to rule out pelvic abnormality as cause for cramping/pelvic pain in the setting of no infection like symptoms. Consider Birth control for cycle control if overly bothersome. Will notify of result and further planning.       Laura Gonzalez, BONNIE CNP  10:03 AM 10/2/2023

## 2023-10-02 NOTE — NURSING NOTE
Chief Complaint   Patient presents with    Consult     Cramping      Patient presents to the clinic for cramping and bleeding more heavy since having tubes tied a year ago.     Antonietta Freedman LPN       FOOD SECURITY SCREENING QUESTIONS:    The next two questions are to help us understand your food security.  If you are feeling you need any assistance in this area, we have resources available to support you today.    Hunger Vital Signs:  Within the past 12 months we worried whether our food would run out before we got money to buy more. Never  Within the past 12 months the food we bought just didn't last and we didn't have money to get more. Never    Food Insecurity: Not on file

## 2023-10-05 ENCOUNTER — HOSPITAL ENCOUNTER (OUTPATIENT)
Dept: ULTRASOUND IMAGING | Facility: OTHER | Age: 23
Discharge: HOME | End: 2023-10-05
Payer: COMMERCIAL

## 2023-10-05 DIAGNOSIS — R10.2 PELVIC PAIN IN FEMALE: ICD-10-CM

## 2023-10-05 DIAGNOSIS — N93.9 ABNORMAL UTERINE BLEEDING (AUB): ICD-10-CM

## 2023-10-05 PROCEDURE — 76830 TRANSVAGINAL US NON-OB: CPT

## 2023-10-06 ENCOUNTER — OFFICE VISIT (OUTPATIENT)
Dept: OBGYN | Facility: OTHER | Age: 23
End: 2023-10-06
Payer: COMMERCIAL

## 2023-10-06 VITALS
DIASTOLIC BLOOD PRESSURE: 70 MMHG | WEIGHT: 146 LBS | HEART RATE: 76 BPM | SYSTOLIC BLOOD PRESSURE: 106 MMHG | BODY MASS INDEX: 26.92 KG/M2

## 2023-10-06 DIAGNOSIS — N93.9 ABNORMAL UTERINE BLEEDING (AUB): ICD-10-CM

## 2023-10-06 DIAGNOSIS — R10.2 PELVIC PAIN IN FEMALE: Primary | ICD-10-CM

## 2023-10-06 PROCEDURE — G0463 HOSPITAL OUTPT CLINIC VISIT: HCPCS

## 2023-10-06 PROCEDURE — 99213 OFFICE O/P EST LOW 20 MIN: CPT

## 2023-10-06 NOTE — NURSING NOTE
Chief Complaint   Patient presents with    Follow Up     US results      Patient presents to the clinic for US results.     Antonietta Freedman LPN

## 2023-10-06 NOTE — PROGRESS NOTES
Follow-Up Visit    S: Ms. Bebe Carl is a 22 year old  here for US results. We discussed US was normal today. She continues to report pelvic cramping that occurs almost daily worsening right before her cycle. She denies bowel concerns. She is thinking that her tubal is the concern and is wondering if this would cause pain.     O:  /70   Pulse 76   Wt 66.2 kg (146 lb)   LMP 2023 (Exact Date)   BMI 26.92 kg/m    Gen: Well-appearing, NAD  Pulm: nonlabored  A/P:  Ms. Bebe Carl is a 22 year old  here for follow up US results. We discussed today that US is normal not indicating a cause for pain. We discussed that birth control/hormones will often help with pain and bleeding in this situation and will also help to control endometriosis if this is a concern. She declines any hormone use. We also discussed ibuprofen use for heavy bleeding to assist with this. We discussed that tubal would not cause this pain and that she no longer has tubes at all with the procedure. We discussed that this could the way that her cycles will be from now on with no birth control use. She would like to discuss with a surgeon to see if there is a possible surgical procedure for evaluation. She will follow up with KRISTIN Levy-MAILE  10/6/2023 9:21 AM

## 2023-10-26 ENCOUNTER — OFFICE VISIT (OUTPATIENT)
Dept: OBGYN | Facility: OTHER | Age: 23
End: 2023-10-26
Attending: OBSTETRICS & GYNECOLOGY
Payer: COMMERCIAL

## 2023-10-26 VITALS
BODY MASS INDEX: 27.29 KG/M2 | DIASTOLIC BLOOD PRESSURE: 70 MMHG | WEIGHT: 148 LBS | SYSTOLIC BLOOD PRESSURE: 118 MMHG | HEART RATE: 111 BPM

## 2023-10-26 DIAGNOSIS — N92.0 MENORRHAGIA WITH REGULAR CYCLE: ICD-10-CM

## 2023-10-26 DIAGNOSIS — R10.2 PELVIC PAIN IN FEMALE: Primary | ICD-10-CM

## 2023-10-26 PROCEDURE — 99214 OFFICE O/P EST MOD 30 MIN: CPT | Performed by: OBSTETRICS & GYNECOLOGY

## 2023-10-26 PROCEDURE — G0463 HOSPITAL OUTPT CLINIC VISIT: HCPCS

## 2023-10-26 RX ORDER — ACETAMINOPHEN 325 MG/1
975 TABLET ORAL ONCE
Status: CANCELLED | OUTPATIENT
Start: 2023-10-26 | End: 2023-10-26

## 2023-10-26 ASSESSMENT — PAIN SCALES - GENERAL: PAINLEVEL: MODERATE PAIN (4)

## 2023-10-26 NOTE — CONFIDENTIAL NOTE
"Date of Surgery: 11/15/2023  Type of Surgery: Dx lap with treatment of endo as needed  Surgeon: Dr. Lucas Mohamud     Patient was given surgical folder  appropriate appointments were scheduled by the [facility] .. Surgical forms were copied and kept for informative purposes. Originals were delivered to Day-surgery. Questions were answered to the best of this nurse's ability.        STOP BANG    Fever/Chills or other infectious symptoms in past month? No  >10 pound weight loss in the past 2 months? No  Health Care Directive on file? Yes  History of blood transfusions? Yes  Td up to date? Yes  History of VRE/MRSA? No      Obstructive Sleep Apnea screening    Preoperative Evaluation: Obstructive Sleep Apnea screening    S: Snore -  Do you snore loudly? (louder than talking or loud enough to be heard through closed doors) Yes  T: Tired - Do you often feel tired, fatigued, or sleepy during the daytime?No  O: Observed - Has anyone ever observed you stop breathing during your sleep?No  P: Pressure - Do you have or are you being treated for high blood pressure?No  B: BMI - BMI greater than 35kg/m2?No  A: Age - Age over 50 years old?No  N: Neck - Neck circumference greater than 40 cm?No  G: Gender - Gender: Male?No    Total number of \"YES\" responses:  1    Scoring: Low risk of OMAR 0-2  At Risk of OMAR: >3 High Risk of OMAR: 5-8      Total yes answers in OMAR section:    Low risk 0-2  At risk 3-4  High risk 5-8    Magaly Vicente RN............. 10/26/2023 1:56 PM    "

## 2023-10-26 NOTE — NURSING NOTE
"Chief Complaint   Patient presents with    Consult     Pelvic pain   Patient is here for a consult for pelvic pain. Patient is having pelvic pain everyday.     Initial /70   Pulse 111   Wt 67.1 kg (148 lb)   LMP 09/23/2023 (Exact Date)   BMI 27.29 kg/m   Estimated body mass index is 27.29 kg/m  as calculated from the following:    Height as of 9/28/23: 1.568 m (5' 1.75\").    Weight as of this encounter: 67.1 kg (148 lb).  Medication Reconciliation: complete    FOOD SECURITY SCREENING QUESTIONS  Hunger Vital Signs:  Within the past 12 months we worried whether our food would run out before we got money to buy more. Never  Within the past 12 months the food we bought just didn't last and we didn't have money to get more. Never  Lizabeth Engel LPN 10/26/2023 1:40 PM         Lizabeth Engel LPN    "

## 2023-10-26 NOTE — PROGRESS NOTES
CC: pelvic pain  HPI:  Debbie is a 22 year old female who presents for evaluation of pelvic pain.  She has been noting cramping in her lower abdomen every day for about the last year.  She takes Tylenol and ibuprofen daily and is able to get by with his but does not feel well.  She has noted that this has been occurring since her tubal ligation performed after the delivery of her last baby about a year ago.  Also noted that her periods come every month and last about 14 days.  They tend to be very heavy for about 7 days.  She had 2 vaginal births 3 and 1 year ago.  Past surgical history is significant for appendectomy in addition to her tubal.  Past family history significant for a endometriosis and 1 sister and 3 aunts.  She is not certain as to the treatment that her sister is getting but notes that her aunts all had hysterectomies.  She is not interested in hysterectomy at this time.  Patient's last menstrual period was 2023 (exact date).      OB History    Para Term  AB Living   2 2 2 0 0 2   SAB IAB Ectopic Multiple Live Births   0 0 0 0 2      # Outcome Date GA Lbr Alistair/2nd Weight Sex Delivery Anes PTL Lv   2 Term 22 39w2d 02:34 / 01:26 3.958 kg (8 lb 11.6 oz) M Vag-Spont EPI N SARAH      Name: Ruben ALBA      Apgar1: 8  Apgar5: 9   1 Term 20 39w6d 11:38 / 03:00 4.065 kg (8 lb 15.4 oz) F Vag-Spont EPI, IV, Local N SARAH      Complications: Other Excessive Bleeding      Name: QIANA ALBA-DEBBIE      Apgar1: 9  Apgar5: 9      Obstetric Comments   Positive SMA carrier    Negative CF      Past Medical History:   Diagnosis Date    Acute gastroenteritis 2019    ADHD (attention deficit hyperactivity disorder)     Anemia     following delivery     Bipolar 1 disorder (H)     Depression     Depressive disorder     History of blood transfusion     Postpartum depression     Viral gastroenteritis 2019     Past Surgical History:   Procedure Laterality Date    APPENDECTOMY       LAPAROSCOPIC SALPINGECTOMY Bilateral 8/11/2022    Procedure: SALPINGECTOMY, LAPAROSCOPIC;  Surgeon: Mandie Shook MD;  Location:  OR     Social History     Socioeconomic History    Marital status: Single     Spouse name: Not on file    Number of children: 1    Years of education: Not on file    Highest education level: Not on file   Occupational History     Employer: [employer]     Comment: Starting 10/2021   Tobacco Use    Smoking status: Former     Packs/day: 1     Types: Cigarettes    Smokeless tobacco: Never   Vaping Use    Vaping Use: Former   Substance and Sexual Activity    Alcohol use: No    Drug use: No    Sexual activity: Yes     Partners: Male     Birth control/protection: Condom     Comment: plan B prior to positive preg test   Other Topics Concern    Parent/sibling w/ CABG, MI or angioplasty before 65F 55M? Not Asked   Social History Narrative    10/18/21: Single- in a relationship X 5 months     One daughter 1/26/2020     Social Determinants of Health     Financial Resource Strain: Not on file   Food Insecurity: Not on file   Transportation Needs: Not on file   Physical Activity: Not on file   Stress: Not on file   Social Connections: Not on file   Interpersonal Safety: Low Risk  (10/26/2023)    Interpersonal Safety     Do you feel physically and emotionally safe where you currently live?: Yes     Within the past 12 months, have you been hit, slapped, kicked or otherwise physically hurt by someone?: No     Within the past 12 months, have you been humiliated or emotionally abused in other ways by your partner or ex-partner?: No   Housing Stability: Not on file     Family History   Problem Relation Age of Onset    Bipolar Disorder Mother     Attention Deficit Disorder Father     Thyroid Disease Father     Hyperthyroidism Sister     No Known Problems Brother     Brain Tumor Maternal Grandmother     Cancer Maternal Grandfather     Heart Disease Paternal Grandfather     Cancer Paternal Grandfather         Current Outpatient Medications   Medication    acetaminophen (TYLENOL) 325 MG tablet    acetaminophen (TYLENOL) 500 MG tablet    amphetamine-dextroamphetamine (ADDERALL) 15 MG tablet    bacitracin-Polymyxin B (POLYCIN) OINT    ibuprofen (ADVIL/MOTRIN) 800 MG tablet    lamoTRIgine (LAMICTAL) 25 MG tablet    sertraline (ZOLOFT) 25 MG tablet     No current facility-administered medications for this visit.     Allergies   Allergen Reactions    Sulfa Antibiotics Hives and Anaphylaxis    Atomoxetine GI Disturbance     Weight loss    Latex Rash     bandaides only     /70   Pulse 111   Wt 67.1 kg (148 lb)   LMP 09/23/2023 (Exact Date)   BMI 27.29 kg/m      REVIEW OF SYSTEMS  Negative except as noted above    Exam:  Constitutional: healthy, alert, active, and no distress  CV: RRR no GRM   Resp: CTA B     Lab: No results found for any visits on 10/26/23.    ASSESSMENT/PLAN :  1. Pelvic pain in female    2. Menorrhagia with regular cycle      We discussed in detail treatment options for pelvic pain and heavy menstrual cycles.  These included use of oral contraceptive tablets, Mirena IUD, and surgical intervention.  She is interested in getting a diagnosis prior to starting the therapy.  I think this is reasonable at this time given that her symptoms of been lasting for an entire year.  We will plan on scheduling her for diagnostic laparoscopy and treat endometriosis appropriately if encountered.  She is again not interested in hysterectomy so we will be conservative with regards to surgical treatment.  She is okay for general anesthesia.  We discussed the risks and benefits as well as alternatives to surgical intervention and she does wish to proceed.    Lucas Mohamud MD FACOG  1:51 PM 10/26/2023

## 2023-10-28 ENCOUNTER — NURSE TRIAGE (OUTPATIENT)
Dept: NURSING | Facility: CLINIC | Age: 23
End: 2023-10-28
Payer: COMMERCIAL

## 2023-10-29 NOTE — TELEPHONE ENCOUNTER
"Bebe is having Dental Pain & Facial Swelling.    - Bottom left side - [place] tooth \"growing in\" - hole in gum over tooth.  - Pain started 3-4 days ago  - Side of mouth is swollen  - Fever yesterday - Denies Fever at this time.    Advised to be seen within 4 hours, (MORIS)  States that she does not have a PCP    She is an established Dental pt - Advised speaking to on-call Dental provider    Tereza Farris RN  [facility]      Reason for Disposition   Face is very swollen    Additional Information   Negative: Shock suspected (e.g., cold/pale/clammy skin, too weak to stand, low BP, rapid pulse)   Negative: [1] Similar pain previously AND [2] it was from \"heart attack\"   Negative: [1] Similar pain previously AND [2] it was from \"angina\" AND [3] not relieved by nitroglycerin   Negative: Sounds like a life-threatening emergency to the triager   Negative: Tongue is very swollen and tender   Negative: [1] Face is swollen AND [2] fever   Negative: Patient sounds very sick or weak to the triager   Negative: [1] SEVERE pain (e.g., excruciating, unable to eat, unable to do any normal activities) AND [2] not improved 2 hours after pain medicine    Protocols used: Toothache-A-AH    "

## 2023-11-14 ENCOUNTER — ANESTHESIA EVENT (OUTPATIENT)
Dept: SURGERY | Facility: OTHER | Age: 23
End: 2023-11-14
Payer: COMMERCIAL

## 2023-11-14 RX ORDER — OXYCODONE HYDROCHLORIDE 5 MG/1
5 TABLET ORAL
Status: CANCELLED | OUTPATIENT
Start: 2023-11-14

## 2023-11-14 RX ORDER — SODIUM CHLORIDE, SODIUM LACTATE, POTASSIUM CHLORIDE, CALCIUM CHLORIDE 600; 310; 30; 20 MG/100ML; MG/100ML; MG/100ML; MG/100ML
INJECTION, SOLUTION INTRAVENOUS CONTINUOUS
Status: CANCELLED | OUTPATIENT
Start: 2023-11-14

## 2023-11-14 RX ORDER — ONDANSETRON 4 MG/1
4 TABLET, ORALLY DISINTEGRATING ORAL EVERY 30 MIN PRN
Status: CANCELLED | OUTPATIENT
Start: 2023-11-14

## 2023-11-14 RX ORDER — FENTANYL CITRATE 50 UG/ML
50 INJECTION, SOLUTION INTRAMUSCULAR; INTRAVENOUS EVERY 5 MIN PRN
Status: CANCELLED | OUTPATIENT
Start: 2023-11-14

## 2023-11-14 RX ORDER — FENTANYL CITRATE 50 UG/ML
25 INJECTION, SOLUTION INTRAMUSCULAR; INTRAVENOUS EVERY 5 MIN PRN
Status: CANCELLED | OUTPATIENT
Start: 2023-11-14

## 2023-11-14 RX ORDER — ONDANSETRON 2 MG/ML
4 INJECTION INTRAMUSCULAR; INTRAVENOUS EVERY 30 MIN PRN
Status: CANCELLED | OUTPATIENT
Start: 2023-11-14

## 2023-11-14 RX ORDER — HYDROMORPHONE HCL IN WATER/PF 6 MG/30 ML
0.2 PATIENT CONTROLLED ANALGESIA SYRINGE INTRAVENOUS EVERY 5 MIN PRN
Status: CANCELLED | OUTPATIENT
Start: 2023-11-14

## 2023-11-14 RX ORDER — FENTANYL CITRATE 50 UG/ML
50 INJECTION, SOLUTION INTRAMUSCULAR; INTRAVENOUS
Status: CANCELLED | OUTPATIENT
Start: 2023-11-14

## 2023-11-14 RX ORDER — HYDROMORPHONE HCL IN WATER/PF 6 MG/30 ML
0.4 PATIENT CONTROLLED ANALGESIA SYRINGE INTRAVENOUS EVERY 5 MIN PRN
Status: CANCELLED | OUTPATIENT
Start: 2023-11-14

## 2023-11-14 RX ORDER — OXYCODONE HYDROCHLORIDE 5 MG/1
10 TABLET ORAL
Status: CANCELLED | OUTPATIENT
Start: 2023-11-14

## 2023-11-15 ENCOUNTER — ANESTHESIA (OUTPATIENT)
Dept: SURGERY | Facility: OTHER | Age: 23
End: 2023-11-15
Payer: COMMERCIAL

## 2023-11-15 ENCOUNTER — HOSPITAL ENCOUNTER (OUTPATIENT)
Facility: OTHER | Age: 23
Discharge: HOME | End: 2023-11-15
Attending: OBSTETRICS & GYNECOLOGY | Admitting: OBSTETRICS & GYNECOLOGY
Payer: COMMERCIAL

## 2023-11-15 VITALS
HEART RATE: 83 BPM | RESPIRATION RATE: 16 BRPM | BODY MASS INDEX: 27.19 KG/M2 | TEMPERATURE: 99 F | OXYGEN SATURATION: 95 % | WEIGHT: 144 LBS | HEIGHT: 61 IN

## 2023-11-15 DIAGNOSIS — R10.2 PELVIC PAIN IN FEMALE: ICD-10-CM

## 2023-11-15 LAB
ABO/RH(D): NORMAL
ANTIBODY SCREEN: NEGATIVE
BASOPHILS # BLD AUTO: 0 10E3/UL (ref 0–0.2)
BASOPHILS NFR BLD AUTO: 1 %
EOSINOPHIL # BLD AUTO: 0.4 10E3/UL (ref 0–0.7)
EOSINOPHIL NFR BLD AUTO: 9 %
ERYTHROCYTE [DISTWIDTH] IN BLOOD BY AUTOMATED COUNT: 12.1 % (ref 10–15)
HCG UR QL: NEGATIVE
HCT VFR BLD AUTO: 41.5 % (ref 35–47)
HGB BLD-MCNC: 14.3 G/DL (ref 11.7–15.7)
IMM GRANULOCYTES # BLD: 0 10E3/UL
IMM GRANULOCYTES NFR BLD: 0 %
LYMPHOCYTES # BLD AUTO: 0.7 10E3/UL (ref 0.8–5.3)
LYMPHOCYTES NFR BLD AUTO: 15 %
MCH RBC QN AUTO: 30.2 PG (ref 26.5–33)
MCHC RBC AUTO-ENTMCNC: 34.5 G/DL (ref 31.5–36.5)
MCV RBC AUTO: 88 FL (ref 78–100)
MONOCYTES # BLD AUTO: 0.6 10E3/UL (ref 0–1.3)
MONOCYTES NFR BLD AUTO: 13 %
NEUTROPHILS # BLD AUTO: 3.1 10E3/UL (ref 1.6–8.3)
NEUTROPHILS NFR BLD AUTO: 62 %
NRBC # BLD AUTO: 0 10E3/UL
NRBC BLD AUTO-RTO: 0 /100
PLATELET # BLD AUTO: 287 10E3/UL (ref 150–450)
RBC # BLD AUTO: 4.73 10E6/UL (ref 3.8–5.2)
SPECIMEN EXPIRATION DATE: NORMAL
WBC # BLD AUTO: 5 10E3/UL (ref 4–11)

## 2023-11-15 PROCEDURE — 85025 COMPLETE CBC W/AUTO DIFF WBC: CPT | Performed by: OBSTETRICS & GYNECOLOGY

## 2023-11-15 PROCEDURE — 250N000013 HC RX MED GY IP 250 OP 250 PS 637: Performed by: OBSTETRICS & GYNECOLOGY

## 2023-11-15 PROCEDURE — 36415 COLL VENOUS BLD VENIPUNCTURE: CPT | Performed by: OBSTETRICS & GYNECOLOGY

## 2023-11-15 PROCEDURE — 86901 BLOOD TYPING SEROLOGIC RH(D): CPT | Performed by: OBSTETRICS & GYNECOLOGY

## 2023-11-15 PROCEDURE — 81025 URINE PREGNANCY TEST: CPT | Performed by: OBSTETRICS & GYNECOLOGY

## 2023-11-15 RX ORDER — SODIUM CHLORIDE, SODIUM LACTATE, POTASSIUM CHLORIDE, CALCIUM CHLORIDE 600; 310; 30; 20 MG/100ML; MG/100ML; MG/100ML; MG/100ML
INJECTION, SOLUTION INTRAVENOUS CONTINUOUS
Status: DISCONTINUED | OUTPATIENT
Start: 2023-11-15 | End: 2023-11-15 | Stop reason: HOSPADM

## 2023-11-15 RX ORDER — ACETAMINOPHEN 325 MG/1
975 TABLET ORAL ONCE
Status: COMPLETED | OUTPATIENT
Start: 2023-11-15 | End: 2023-11-15

## 2023-11-15 RX ORDER — LIDOCAINE 40 MG/G
CREAM TOPICAL
Status: DISCONTINUED | OUTPATIENT
Start: 2023-11-15 | End: 2023-11-15 | Stop reason: HOSPADM

## 2023-11-15 RX ADMIN — ACETAMINOPHEN 975 MG: 325 TABLET, FILM COATED ORAL at 08:40

## 2023-11-15 ASSESSMENT — ACTIVITIES OF DAILY LIVING (ADL): ADLS_ACUITY_SCORE: 35

## 2023-11-15 NOTE — OR NURSING
Per XUAN and Dr. Mohamud, surgical case will be cancelled due to patient being recently sick with diarrhea and vomiting yesterday, and new onset of red, raised, itchy rash on top of right leg and behind both knees.    Dr. Mohamud currently at bedside.     Patient discharged home at this time. Declined going to urgent care.

## 2023-11-15 NOTE — ANESTHESIA PREPROCEDURE EVALUATION
Anesthesia Pre-Procedure Evaluation    Patient: Bebe Carl   MRN: [id number] : 2000        Procedure : Procedure(s):  DIAGNOSTIC LAPAROSCOPY          Past Medical History:   Diagnosis Date    Acute gastroenteritis 2019    ADHD (attention deficit hyperactivity disorder)     Anemia     following delivery     Bipolar 1 disorder (H)     Depression     Depressive disorder     History of blood transfusion     Postpartum depression     Viral gastroenteritis 2019      Past Surgical History:   Procedure Laterality Date    APPENDECTOMY      LAPAROSCOPIC SALPINGECTOMY Bilateral 2022    Procedure: SALPINGECTOMY, LAPAROSCOPIC;  Surgeon: Mandie Shook MD;  Location: [facility] OR      Allergies   Allergen Reactions    Sulfa Antibiotics Hives and Anaphylaxis    Atomoxetine GI Disturbance     Weight loss    Latex Rash     bandaides only      Social History     Tobacco Use    Smoking status: Former     Packs/day: 1     Types: Cigarettes    Smokeless tobacco: Never   Substance Use Topics    Alcohol use: No      Wt Readings from Last 1 Encounters:   11/15/23 65.3 kg (144 lb)        Anesthesia Evaluation   Pt has had prior anesthetic.     No history of anesthetic complications       ROS/MED HX  ENT/Pulmonary:  - neg pulmonary ROS     Neurologic:  - neg neurologic ROS     Cardiovascular:       METS/Exercise Tolerance: >4 METS    Hematologic:     (+)      anemia, history of blood transfusion,         Musculoskeletal:  - neg musculoskeletal ROS     GI/Hepatic:  - neg GI/hepatic ROS     Renal/Genitourinary:  - neg Renal ROS     Endo:  - neg endo ROS     Psychiatric/Substance Use: Comment: ADHD    (+) psychiatric history bipolar and depression       Infectious Disease:  - neg infectious disease ROS     Malignancy:  - neg malignancy ROS     Other:  - neg other ROS          Physical Exam    Airway        Mallampati: I   TM distance: > 3 FB   Neck ROM: full   Mouth opening: > 3 cm    Respiratory Devices and  "Support         Dental       (+) Completely normal teeth      Cardiovascular   cardiovascular exam normal       Rhythm and rate: regular and normal     Pulmonary   pulmonary exam normal        breath sounds clear to auscultation           OUTSIDE LABS:  CBC:   Lab Results   Component Value Date    WBC 5.0 11/15/2023    WBC 10.6 06/15/2022    HGB 14.3 11/15/2023    HGB 11.2 (L) 06/15/2022    HCT 41.5 11/15/2023    HCT 35.0 06/15/2022     11/15/2023     (H) 06/15/2022     BMP:   Lab Results   Component Value Date     06/15/2022     06/12/2022    POTASSIUM 4.3 06/15/2022    POTASSIUM 4.1 06/12/2022    CHLORIDE 106 06/15/2022    CHLORIDE 103 06/12/2022    CO2 24 06/15/2022    CO2 24 06/12/2022    BUN 6 (L) 06/15/2022    BUN 7 06/12/2022    CR 0.74 06/15/2022    CR 0.64 06/12/2022    GLC 73 08/11/2022    GLC 81 06/15/2022     COAGS: No results found for: \"PTT\", \"INR\", \"FIBR\"  POC:   Lab Results   Component Value Date    HCG Negative 11/15/2023     HEPATIC:   Lab Results   Component Value Date    ALBUMIN 3.8 06/15/2022    PROTTOTAL 6.9 06/15/2022    ALT 6 (L) 06/15/2022    AST 11 (L) 06/15/2022    ALKPHOS 92 06/15/2022    BILITOTAL 0.5 06/15/2022    BILIDIRECT 0.39 (H) 12/20/2016     OTHER:   Lab Results   Component Value Date    LACT 0.8 02/26/2020    NILSON 8.2 (L) 06/15/2022    MAG 2.5 06/15/2022    TSH 1.27 10/02/2023    CRP 68.1 (H) 06/12/2022       Anesthesia Plan    ASA Status:  1    NPO Status:  NPO Appropriate    Anesthesia Type: General.     - Airway: ETT   Induction: Intravenous, Propofol.   Maintenance: Balanced.        Consents    Anesthesia Plan(s) and associated risks, benefits, and realistic alternatives discussed. Questions answered and patient/representative(s) expressed understanding.     - Discussed: Risks, Benefits and Alternatives for BOTH SEDATION and the PROCEDURE were discussed     - Discussed with:  Patient      - Extended Intubation/Ventilatory Support Discussed: No.      " - Patient is DNR/DNI Status: No     Use of blood products discussed: No .     Postoperative Care    Pain management: IV analgesics.   PONV prophylaxis: Ondansetron (or other 5HT-3)     Comments:                BONNIE Mckeon CRNA

## 2023-12-03 ENCOUNTER — NURSE TRIAGE (OUTPATIENT)
Dept: NURSING | Facility: CLINIC | Age: 23
End: 2023-12-03
Payer: COMMERCIAL

## 2023-12-03 NOTE — TELEPHONE ENCOUNTER
Hx.  Anemia.  Pt calling due to lightheadedness and some scant vaginal bleeding.  Her last period was 10/28/23.  She bled for 14 days which is normal for her.  Had a tubal ligation a year ago.  Having some cramping.  Dizziness is more severe room is spinning.    Nurse Triage SBAR    Is this a 2nd Level Triage? NO    Protocol Recommended Disposition:   Go to ED Now    Recommendation: ER  Reason for Disposition   SEVERE dizziness (e.g., unable to stand, requires support to walk, feels like passing out now)    Additional Information   Negative: Shock suspected (e.g., cold/pale/clammy skin, too weak to stand, low BP, rapid pulse)   Negative: Difficult to awaken or acting confused (e.g., disoriented, slurred speech)   Negative: Passed out (i.e., lost consciousness, collapsed and was not responding)   Negative: Sounds like a life-threatening emergency to the triager   Negative: Followed a genital area injury (e.g., vagina, vulva)   Negative: Pregnant 20 or more weeks   Negative: Pregnant < 20 weeks  (less than 5 months)   Negative: Postpartum (from 0 to 6 weeks after delivery)   Negative: Bleeding occurring > 12 months after menopause   Negative: Bleeding from sexual abuse or rape   Negative: [1] Vaginal discharge is main symptom AND [2] small amount of blood   Negative: SEVERE abdominal pain    Protocols used: Vaginal Bleeding - [id number]      Shira Otoole RN on 12/3/2023 at 12:10 PM

## 2023-12-04 ENCOUNTER — TELEPHONE (OUTPATIENT)
Dept: FAMILY MEDICINE | Facility: OTHER | Age: 23
End: 2023-12-04
Payer: COMMERCIAL

## 2023-12-04 NOTE — TELEPHONE ENCOUNTER
After verifying pt last name and date of birth, pt states that she is on her period and it is super light, she states this is a abnormal period for her. Pt states that she had a tubal so there is no concern for pregnancy. Pt denies heavy bleeding and severe cramping.     Pt states she would like to set up a follow up visit with a provider because she had to cancel her surgery.     Magaly GARBER RN .......December 4, 2023 3:39 PM  Registered Nurse for [facility] providers

## 2023-12-04 NOTE — TELEPHONE ENCOUNTER
Pt states that she is bleeding very light and she has cramping, nausea and light headiness.  Please call.    Eusebio Wilder on 12/4/2023 at 2:31 PM

## 2023-12-06 NOTE — TELEPHONE ENCOUNTER
Bebe is scheduled to see Laura Gonzalez on 12/7/23 at 8:15 am.  Judie Aldana on 12/6/2023 at 11:29 AM

## 2023-12-07 ENCOUNTER — OFFICE VISIT (OUTPATIENT)
Dept: OBGYN | Facility: OTHER | Age: 23
End: 2023-12-07
Payer: COMMERCIAL

## 2023-12-07 VITALS
DIASTOLIC BLOOD PRESSURE: 72 MMHG | BODY MASS INDEX: 27.78 KG/M2 | HEART RATE: 84 BPM | SYSTOLIC BLOOD PRESSURE: 106 MMHG | WEIGHT: 147 LBS

## 2023-12-07 DIAGNOSIS — N92.0 MENORRHAGIA WITH REGULAR CYCLE: Primary | ICD-10-CM

## 2023-12-07 PROCEDURE — 99213 OFFICE O/P EST LOW 20 MIN: CPT

## 2023-12-07 PROCEDURE — G0463 HOSPITAL OUTPT CLINIC VISIT: HCPCS

## 2023-12-07 NOTE — PROGRESS NOTES
Follow-Up Visit    S: Ms. Bebe Carl is a 22 year old  here for concerns of a light period when usually her cycles are very heavy. She notes her period only lasted three days and that this was abnormal for her. She reports flow is usually heavy but her last period was lighter. She also would like to reschedule her dx lap surgery.     O:  /72   Pulse 84   Wt 66.7 kg (147 lb)   LMP 2023 (Exact Date)   BMI 27.78 kg/m    Gen: Well-appearing, NAD  Pulm: nonlabored  Abd: Soft, non-tender, non-distended    A/P:  Ms. Bebe Carl is a 22 year old  here for the above concerns. Discussed that this can be normal and vary from month to month. She is reassured by this. She will schedule with [facility] to re consult for surgery.     NAVEEN Guo  2023 8:44 AM     REVIEW OF SYSTEMS:  Constitutional: Fever, Fatigue and Generalized Weakness  Integumentary problem(s):negative  HEENT Problem(s): Headaches, Lightheaded or Dizzy and Sinus Congestion  Cardiovascular problem(s): Chest Pain and Shortness of Breath on Exertion  Respiratory problem(s): neg  Gastro-intestinal problem(s): blood in stool  Genito-urinary problem(s): Negative  Musculoskeletal problem(s): Arthritis  Neurological problem(s): Negative  Psychiatric problem(s): Anxiety  Endocrine problem(s): Negative  Hematologic and/or Lymphatic problem(s): Negative    Patient does not smoke.    Patient does not take aspirin.

## 2023-12-07 NOTE — NURSING NOTE
Chief Complaint   Patient presents with    Follow Up     Vaginal bleeding        Medication Reconciliation: complete    Patient presents to the clinic for vaginal bleeding she stated that she has had heavy periods but this month it is very light.     Antonietta Freedman LPN

## 2024-01-05 ENCOUNTER — OFFICE VISIT (OUTPATIENT)
Dept: OBGYN | Facility: OTHER | Age: 24
End: 2024-01-05
Attending: OBSTETRICS & GYNECOLOGY
Payer: COMMERCIAL

## 2024-01-05 VITALS
SYSTOLIC BLOOD PRESSURE: 102 MMHG | BODY MASS INDEX: 27.94 KG/M2 | HEIGHT: 61 IN | HEART RATE: 76 BPM | WEIGHT: 148 LBS | DIASTOLIC BLOOD PRESSURE: 60 MMHG

## 2024-01-05 DIAGNOSIS — N94.6 DYSMENORRHEA: Primary | ICD-10-CM

## 2024-01-05 DIAGNOSIS — N92.0 MENORRHAGIA WITH REGULAR CYCLE: ICD-10-CM

## 2024-01-05 PROCEDURE — 99214 OFFICE O/P EST MOD 30 MIN: CPT | Performed by: OBSTETRICS & GYNECOLOGY

## 2024-01-05 PROCEDURE — G0463 HOSPITAL OUTPT CLINIC VISIT: HCPCS | Performed by: OBSTETRICS & GYNECOLOGY

## 2024-01-05 RX ORDER — ACETAMINOPHEN 325 MG/1
975 TABLET ORAL ONCE
Status: CANCELLED | OUTPATIENT
Start: 2024-01-05 | End: 2024-01-05

## 2024-01-05 ASSESSMENT — PAIN SCALES - GENERAL: PAINLEVEL: NO PAIN (0)

## 2024-01-05 NOTE — H&P (VIEW-ONLY)
Gynecology Visit- Preop H&P    CC: dysmenorrhea, heavy menses    HPI:    Debbie Alba is a 23 year old , here for the above concerns. She was amenorrheic while breastfeeding her son, stopped 2023. Her periods started shortly after and have been very heavy, painful, and lasting 14 days. She also has daily cramping even when not on her period.  Her periods were normal between her kids but was also on OCPs. She had a tubal ligation after her son because she did not want to take birth control anymore. She had a normal pelvic ultrasound 10/5/23. She was seen by Dr Mohamud in consultation and was told she may have endometriosis and had been scheduled for a diagnostic laparoscopy but she cancelled surgery and was not able to be rescheduled because he has now left the organization. She would like to get this rescheduled. She feels like her symptoms are because of her sterilization and wants to know if the tubes can be reconnected    OBHx  OB History    Para Term  AB Living   2 2 2 0 0 2   SAB IAB Ectopic Multiple Live Births   0 0 0 0 2      # Outcome Date GA Lbr Alistair/2nd Weight Sex Delivery Anes PTL Lv   2 Term 22 39w2d 02:34 / 01:26 3.958 kg (8 lb 11.6 oz) M Vag-Spont EPI N SARAH      Name: Ruben ALBA      Apgar1: 8  Apgar5: 9   1 Term 20 39w6d 11:38 / 03:00 4.065 kg (8 lb 15.4 oz) F Vag-Spont EPI, IV, Local N SRAAH      Complications: Other Excessive Bleeding      Name: ANJALIFEMALE-DEBBIE      Apgar1: 9  Apgar5: 9      Obstetric Comments   Positive SMA carrier    Negative CF        PMHx:   Past Medical History:   Diagnosis Date    Acute gastroenteritis 2019    ADHD (attention deficit hyperactivity disorder)     Anemia     following delivery     Bipolar 1 disorder (H)     Depression     Depressive disorder     History of blood transfusion     Postpartum depression     Viral gastroenteritis 2019      PSHx:   Past Surgical History:   Procedure Laterality Date    APPENDECTOMY   "    LAPAROSCOPIC SALPINGECTOMY Bilateral 2022    Procedure: SALPINGECTOMY, LAPAROSCOPIC;  Surgeon: Mandie Shook MD;  Location:  OR     Meds:   Current Outpatient Medications   Medication    acetaminophen (TYLENOL) 325 MG tablet    acetaminophen (TYLENOL) 500 MG tablet    amphetamine-dextroamphetamine (ADDERALL) 15 MG tablet    ibuprofen (ADVIL/MOTRIN) 800 MG tablet    lamoTRIgine (LAMICTAL) 25 MG tablet    sertraline (ZOLOFT) 25 MG tablet     No current facility-administered medications for this visit.      Allergies:     Allergies   Allergen Reactions    Sulfa Antibiotics Hives and Anaphylaxis    Atomoxetine GI Disturbance     Weight loss    Latex Rash     bandaides only       SocHx:   Social History     Tobacco Use    Smoking status: Former     Packs/day: 1     Types: Cigarettes    Smokeless tobacco: Never   Vaping Use    Vaping Use: Former   Substance Use Topics    Alcohol use: No    Drug use: No       FamHx:   Family History   Problem Relation Age of Onset    Bipolar Disorder Mother     Attention Deficit Disorder Father     Thyroid Disease Father     Hyperthyroidism Sister     No Known Problems Brother     Brain Tumor Maternal Grandmother     Cancer Maternal Grandfather     Heart Disease Paternal Grandfather     Cancer Paternal Grandfather         ROS: 10-Point ROS negative except as noted in HPI    Physical Exam  /60 (BP Location: Right arm, Patient Position: Sitting, Cuff Size: Adult Regular)   Pulse 76   Ht 1.537 m (5' 0.5\")   Wt 67.1 kg (148 lb)   LMP 2023 (Exact Date)   BMI 28.43 kg/m    Gen: Well-appearing, NAD  CV: RRR  Resp: CTAB  Psych: appropriate mood and affect      Assessment/Plan  Bebe Carl is a 23 year old  female here for dysmenorrhea, AUB, daily pelvic pain. Reviewed images from her laparoscopic bilateral salpingectomy in - no evidence of endometriosis at that time. Explained that it would be unusual for her to develop endometriosis immediately " after breastfeeding and this is more likely a change in her periods after having children. Although her periods were not heavy between her kids, she was also on birth control at the time. Explained that after salpingectomy, her fallopian tubes cannot be put back. Explained that if endometriosis is found, it is generally treated with hormonal suppression with CHC, Depo or Mirena. If endometriosis is not found, the above options would still be the same. She is adamant she does not want to take any birth control as that is the reason she had her tubal ligation. She also does not want a hysterectomy. She wants to start with the diagnostic laparoscopy and will treat any endometriosis if present. She will then consider what she wants to do with the results. Tentatively scheduled for 1/31/24. Preop completed today.      Mandie Shook MD  OB/GYN  1/5/2024 12:13 PM

## 2024-01-05 NOTE — PROGRESS NOTES
Gynecology Visit- Preop H&P    CC: dysmenorrhea, heavy menses    HPI:    Debbie Alba is a 23 year old , here for the above concerns. She was amenorrheic while breastfeeding her son, stopped 2023. Her periods started shortly after and have been very heavy, painful, and lasting 14 days. She also has daily cramping even when not on her period.  Her periods were normal between her kids but was also on OCPs. She had a tubal ligation after her son because she did not want to take birth control anymore. She had a normal pelvic ultrasound 10/5/23. She was seen by Dr Mohamud in consultation and was told she may have endometriosis and had been scheduled for a diagnostic laparoscopy but she cancelled surgery and was not able to be rescheduled because he has now left the organization. She would like to get this rescheduled. She feels like her symptoms are because of her sterilization and wants to know if the tubes can be reconnected    OBHx  OB History    Para Term  AB Living   2 2 2 0 0 2   SAB IAB Ectopic Multiple Live Births   0 0 0 0 2      # Outcome Date GA Lbr Alistair/2nd Weight Sex Delivery Anes PTL Lv   2 Term 22 39w2d 02:34 / 01:26 3.958 kg (8 lb 11.6 oz) M Vag-Spont EPI N SARAH      Name: Ruben ALBA      Apgar1: 8  Apgar5: 9   1 Term 20 39w6d 11:38 / 03:00 4.065 kg (8 lb 15.4 oz) F Vag-Spont EPI, IV, Local N SARAH      Complications: Other Excessive Bleeding      Name: ANJALIFEMALE-DEBBIE      Apgar1: 9  Apgar5: 9      Obstetric Comments   Positive SMA carrier    Negative CF        PMHx:   Past Medical History:   Diagnosis Date    Acute gastroenteritis 2019    ADHD (attention deficit hyperactivity disorder)     Anemia     following delivery     Bipolar 1 disorder (H)     Depression     Depressive disorder     History of blood transfusion     Postpartum depression     Viral gastroenteritis 2019      PSHx:   Past Surgical History:   Procedure Laterality Date    APPENDECTOMY   "    LAPAROSCOPIC SALPINGECTOMY Bilateral 2022    Procedure: SALPINGECTOMY, LAPAROSCOPIC;  Surgeon: Mandie Shook MD;  Location:  OR     Meds:   Current Outpatient Medications   Medication    acetaminophen (TYLENOL) 325 MG tablet    acetaminophen (TYLENOL) 500 MG tablet    amphetamine-dextroamphetamine (ADDERALL) 15 MG tablet    ibuprofen (ADVIL/MOTRIN) 800 MG tablet    lamoTRIgine (LAMICTAL) 25 MG tablet    sertraline (ZOLOFT) 25 MG tablet     No current facility-administered medications for this visit.      Allergies:     Allergies   Allergen Reactions    Sulfa Antibiotics Hives and Anaphylaxis    Atomoxetine GI Disturbance     Weight loss    Latex Rash     bandaides only       SocHx:   Social History     Tobacco Use    Smoking status: Former     Packs/day: 1     Types: Cigarettes    Smokeless tobacco: Never   Vaping Use    Vaping Use: Former   Substance Use Topics    Alcohol use: No    Drug use: No       FamHx:   Family History   Problem Relation Age of Onset    Bipolar Disorder Mother     Attention Deficit Disorder Father     Thyroid Disease Father     Hyperthyroidism Sister     No Known Problems Brother     Brain Tumor Maternal Grandmother     Cancer Maternal Grandfather     Heart Disease Paternal Grandfather     Cancer Paternal Grandfather         ROS: 10-Point ROS negative except as noted in HPI    Physical Exam  /60 (BP Location: Right arm, Patient Position: Sitting, Cuff Size: Adult Regular)   Pulse 76   Ht 1.537 m (5' 0.5\")   Wt 67.1 kg (148 lb)   LMP 2023 (Exact Date)   BMI 28.43 kg/m    Gen: Well-appearing, NAD  CV: RRR  Resp: CTAB  Psych: appropriate mood and affect      Assessment/Plan  Bebe Carl is a 23 year old  female here for dysmenorrhea, AUB, daily pelvic pain. Reviewed images from her laparoscopic bilateral salpingectomy in - no evidence of endometriosis at that time. Explained that it would be unusual for her to develop endometriosis immediately " after breastfeeding and this is more likely a change in her periods after having children. Although her periods were not heavy between her kids, she was also on birth control at the time. Explained that after salpingectomy, her fallopian tubes cannot be put back. Explained that if endometriosis is found, it is generally treated with hormonal suppression with CHC, Depo or Mirena. If endometriosis is not found, the above options would still be the same. She is adamant she does not want to take any birth control as that is the reason she had her tubal ligation. She also does not want a hysterectomy. She wants to start with the diagnostic laparoscopy and will treat any endometriosis if present. She will then consider what she wants to do with the results. Tentatively scheduled for 1/31/24. Preop completed today.      Mandie Shook MD  OB/GYN  1/5/2024 12:13 PM

## 2024-01-05 NOTE — NURSING NOTE
Chief Complaint   Patient presents with    Consult For     Discuss Surgery        Medication Reconciliation: complete          Nancy Cortes LPN........................1/5/2024  9:44 AM

## 2024-01-05 NOTE — CONFIDENTIAL NOTE
"Date of Surgery: 1/31/2024  Type of Surgery: diagnostic lap   Surgeon: Dr. Gayathri Shook     Patient was given surgical folder  which includes pre-operative bathing instructions related to the two packets of Hibiclens surgical prep provided. appropriate appointments were scheduled by the [facility] .. Surgical forms were copied and kept for informative purposes. Originals were delivered to Day-surgery. Questions were answered to the best of this nurse's ability.        STOP BANG    Fever/Chills or other infectious symptoms in past month? no  >10 pound weight loss in the past 2 months? no  Health Care Directive on file? yes  History of blood transfusions? yes  Td up to date? yes  History of VRE/MRSA? no      Obstructive Sleep Apnea screening    Preoperative Evaluation: Obstructive Sleep Apnea screening    S: Snore -  Do you snore loudly? (louder than talking or loud enough to be heard through closed doors) No  T: Tired - Do you often feel tired, fatigued, or sleepy during the daytime?No  O: Observed - Has anyone ever observed you stop breathing during your sleep?No  P: Pressure - Do you have or are you being treated for high blood pressure?No  B: BMI - BMI greater than 35kg/m2?No  A: Age - Age over 50 years old?No  N: Neck - Neck circumference greater than 40 cm?No  G: Gender - Gender: Male?No    Total number of \"YES\" responses:  no    Scoring: Low risk of OMAR 0-2  At Risk of OMAR: >3 High Risk of OMAR: 5-8      Total yes answers in OMAR section:    Low risk 0-2  At risk 3-4  High risk 5-8    Magaly Vicente RN............. 1/5/2024 10:10 AM     "

## 2024-01-30 ENCOUNTER — ANESTHESIA EVENT (OUTPATIENT)
Dept: SURGERY | Facility: OTHER | Age: 24
End: 2024-01-30
Payer: COMMERCIAL

## 2024-01-31 ENCOUNTER — ANESTHESIA (OUTPATIENT)
Dept: SURGERY | Facility: OTHER | Age: 24
End: 2024-01-31
Payer: COMMERCIAL

## 2024-01-31 ENCOUNTER — HOSPITAL ENCOUNTER (OUTPATIENT)
Facility: OTHER | Age: 24
Discharge: HOME | End: 2024-01-31
Attending: OBSTETRICS & GYNECOLOGY | Admitting: OBSTETRICS & GYNECOLOGY
Payer: COMMERCIAL

## 2024-01-31 VITALS
TEMPERATURE: 98.2 F | BODY MASS INDEX: 28.47 KG/M2 | RESPIRATION RATE: 18 BRPM | DIASTOLIC BLOOD PRESSURE: 65 MMHG | OXYGEN SATURATION: 99 % | WEIGHT: 145 LBS | HEART RATE: 80 BPM | HEIGHT: 60 IN | SYSTOLIC BLOOD PRESSURE: 101 MMHG

## 2024-01-31 DIAGNOSIS — N92.0 MENORRHAGIA WITH REGULAR CYCLE: ICD-10-CM

## 2024-01-31 DIAGNOSIS — Z98.890 S/P LAPAROSCOPY: Primary | ICD-10-CM

## 2024-01-31 DIAGNOSIS — N94.6 DYSMENORRHEA: ICD-10-CM

## 2024-01-31 LAB — HCG UR QL: NEGATIVE

## 2024-01-31 PROCEDURE — 272N000001 HC OR GENERAL SUPPLY STERILE: Performed by: OBSTETRICS & GYNECOLOGY

## 2024-01-31 PROCEDURE — 49320 DIAG LAPARO SEPARATE PROC: CPT | Performed by: OBSTETRICS & GYNECOLOGY

## 2024-01-31 PROCEDURE — 250N000011 HC RX IP 250 OP 636: Performed by: NURSE ANESTHETIST, CERTIFIED REGISTERED

## 2024-01-31 PROCEDURE — 250N000009 HC RX 250: Performed by: NURSE ANESTHETIST, CERTIFIED REGISTERED

## 2024-01-31 PROCEDURE — 710N000012 HC RECOVERY PHASE 2, PER MINUTE: Performed by: OBSTETRICS & GYNECOLOGY

## 2024-01-31 PROCEDURE — 999N000141 HC STATISTIC PRE-PROCEDURE NURSING ASSESSMENT: Performed by: OBSTETRICS & GYNECOLOGY

## 2024-01-31 PROCEDURE — 250N000011 HC RX IP 250 OP 636: Performed by: OBSTETRICS & GYNECOLOGY

## 2024-01-31 PROCEDURE — 258N000003 HC RX IP 258 OP 636: Performed by: NURSE ANESTHETIST, CERTIFIED REGISTERED

## 2024-01-31 PROCEDURE — 250N000025 HC SEVOFLURANE, PER MIN: Performed by: OBSTETRICS & GYNECOLOGY

## 2024-01-31 PROCEDURE — 49320 DIAG LAPARO SEPARATE PROC: CPT | Performed by: NURSE ANESTHETIST, CERTIFIED REGISTERED

## 2024-01-31 PROCEDURE — 250N000026 HC DESFLURANE, PER MIN: Performed by: OBSTETRICS & GYNECOLOGY

## 2024-01-31 PROCEDURE — 81025 URINE PREGNANCY TEST: CPT | Performed by: OBSTETRICS & GYNECOLOGY

## 2024-01-31 PROCEDURE — 250N000013 HC RX MED GY IP 250 OP 250 PS 637: Performed by: OBSTETRICS & GYNECOLOGY

## 2024-01-31 PROCEDURE — 360N000076 HC SURGERY LEVEL 3, PER MIN: Performed by: OBSTETRICS & GYNECOLOGY

## 2024-01-31 PROCEDURE — 370N000017 HC ANESTHESIA TECHNICAL FEE, PER MIN: Performed by: OBSTETRICS & GYNECOLOGY

## 2024-01-31 PROCEDURE — 250N000013 HC RX MED GY IP 250 OP 250 PS 637: Performed by: NURSE ANESTHETIST, CERTIFIED REGISTERED

## 2024-01-31 PROCEDURE — 710N000010 HC RECOVERY PHASE 1, LEVEL 2, PER MIN: Performed by: OBSTETRICS & GYNECOLOGY

## 2024-01-31 RX ORDER — ONDANSETRON 2 MG/ML
INJECTION INTRAMUSCULAR; INTRAVENOUS PRN
Status: DISCONTINUED | OUTPATIENT
Start: 2024-01-31 | End: 2024-01-31

## 2024-01-31 RX ORDER — NALOXONE HYDROCHLORIDE 0.4 MG/ML
0.4 INJECTION, SOLUTION INTRAMUSCULAR; INTRAVENOUS; SUBCUTANEOUS
Status: DISCONTINUED | OUTPATIENT
Start: 2024-01-31 | End: 2024-01-31 | Stop reason: HOSPADM

## 2024-01-31 RX ORDER — NALOXONE HYDROCHLORIDE 0.4 MG/ML
0.2 INJECTION, SOLUTION INTRAMUSCULAR; INTRAVENOUS; SUBCUTANEOUS
Status: DISCONTINUED | OUTPATIENT
Start: 2024-01-31 | End: 2024-01-31 | Stop reason: HOSPADM

## 2024-01-31 RX ORDER — ONDANSETRON 4 MG/1
4 TABLET, ORALLY DISINTEGRATING ORAL EVERY 30 MIN PRN
Status: DISCONTINUED | OUTPATIENT
Start: 2024-01-31 | End: 2024-01-31 | Stop reason: HOSPADM

## 2024-01-31 RX ORDER — LIDOCAINE 40 MG/G
CREAM TOPICAL
Status: DISCONTINUED | OUTPATIENT
Start: 2024-01-31 | End: 2024-01-31 | Stop reason: HOSPADM

## 2024-01-31 RX ORDER — SODIUM CHLORIDE, SODIUM LACTATE, POTASSIUM CHLORIDE, CALCIUM CHLORIDE 600; 310; 30; 20 MG/100ML; MG/100ML; MG/100ML; MG/100ML
INJECTION, SOLUTION INTRAVENOUS CONTINUOUS
Status: DISCONTINUED | OUTPATIENT
Start: 2024-01-31 | End: 2024-01-31 | Stop reason: HOSPADM

## 2024-01-31 RX ORDER — ACETAMINOPHEN 325 MG/1
975 TABLET ORAL ONCE
Status: COMPLETED | OUTPATIENT
Start: 2024-01-31 | End: 2024-01-31

## 2024-01-31 RX ORDER — OXYCODONE HYDROCHLORIDE 5 MG/1
5 TABLET ORAL
Status: COMPLETED | OUTPATIENT
Start: 2024-01-31 | End: 2024-01-31

## 2024-01-31 RX ORDER — FENTANYL CITRATE 50 UG/ML
25 INJECTION, SOLUTION INTRAMUSCULAR; INTRAVENOUS EVERY 5 MIN PRN
Status: DISCONTINUED | OUTPATIENT
Start: 2024-01-31 | End: 2024-01-31 | Stop reason: HOSPADM

## 2024-01-31 RX ORDER — FENTANYL CITRATE 50 UG/ML
50 INJECTION, SOLUTION INTRAMUSCULAR; INTRAVENOUS EVERY 5 MIN PRN
Status: DISCONTINUED | OUTPATIENT
Start: 2024-01-31 | End: 2024-01-31 | Stop reason: HOSPADM

## 2024-01-31 RX ORDER — BUPIVACAINE HYDROCHLORIDE 2.5 MG/ML
INJECTION, SOLUTION INFILTRATION; PERINEURAL PRN
Status: DISCONTINUED | OUTPATIENT
Start: 2024-01-31 | End: 2024-01-31 | Stop reason: HOSPADM

## 2024-01-31 RX ORDER — ONDANSETRON 2 MG/ML
4 INJECTION INTRAMUSCULAR; INTRAVENOUS EVERY 30 MIN PRN
Status: DISCONTINUED | OUTPATIENT
Start: 2024-01-31 | End: 2024-01-31 | Stop reason: HOSPADM

## 2024-01-31 RX ORDER — LIDOCAINE HYDROCHLORIDE 20 MG/ML
INJECTION, SOLUTION INFILTRATION; PERINEURAL PRN
Status: DISCONTINUED | OUTPATIENT
Start: 2024-01-31 | End: 2024-01-31

## 2024-01-31 RX ORDER — ACETAMINOPHEN 325 MG/1
975 TABLET ORAL ONCE
Status: DISCONTINUED | OUTPATIENT
Start: 2024-01-31 | End: 2024-01-31 | Stop reason: HOSPADM

## 2024-01-31 RX ORDER — OXYCODONE HYDROCHLORIDE 5 MG/1
10 TABLET ORAL
Status: DISCONTINUED | OUTPATIENT
Start: 2024-01-31 | End: 2024-01-31 | Stop reason: HOSPADM

## 2024-01-31 RX ORDER — HYDROMORPHONE HYDROCHLORIDE 1 MG/ML
0.5 INJECTION, SOLUTION INTRAMUSCULAR; INTRAVENOUS; SUBCUTANEOUS EVERY 5 MIN PRN
Status: DISCONTINUED | OUTPATIENT
Start: 2024-01-31 | End: 2024-01-31 | Stop reason: HOSPADM

## 2024-01-31 RX ORDER — IBUPROFEN 200 MG
800 TABLET ORAL ONCE
Status: DISCONTINUED | OUTPATIENT
Start: 2024-01-31 | End: 2024-01-31 | Stop reason: HOSPADM

## 2024-01-31 RX ORDER — PROPOFOL 10 MG/ML
INJECTION, EMULSION INTRAVENOUS PRN
Status: DISCONTINUED | OUTPATIENT
Start: 2024-01-31 | End: 2024-01-31

## 2024-01-31 RX ORDER — DEXAMETHASONE SODIUM PHOSPHATE 4 MG/ML
INJECTION, SOLUTION INTRA-ARTICULAR; INTRALESIONAL; INTRAMUSCULAR; INTRAVENOUS; SOFT TISSUE PRN
Status: DISCONTINUED | OUTPATIENT
Start: 2024-01-31 | End: 2024-01-31

## 2024-01-31 RX ORDER — ACETAMINOPHEN 325 MG/1
975 TABLET ORAL EVERY 6 HOURS PRN
Qty: 30 TABLET | Refills: 0 | Status: SHIPPED | OUTPATIENT
Start: 2024-01-31

## 2024-01-31 RX ORDER — HYDROMORPHONE HCL IN WATER/PF 6 MG/30 ML
0.2 PATIENT CONTROLLED ANALGESIA SYRINGE INTRAVENOUS EVERY 5 MIN PRN
Status: DISCONTINUED | OUTPATIENT
Start: 2024-01-31 | End: 2024-01-31 | Stop reason: HOSPADM

## 2024-01-31 RX ORDER — OXYCODONE HYDROCHLORIDE 5 MG/1
5 TABLET ORAL EVERY 6 HOURS PRN
Qty: 10 TABLET | Refills: 0 | Status: SHIPPED | OUTPATIENT
Start: 2024-01-31

## 2024-01-31 RX ORDER — KETOROLAC TROMETHAMINE 30 MG/ML
INJECTION, SOLUTION INTRAMUSCULAR; INTRAVENOUS PRN
Status: DISCONTINUED | OUTPATIENT
Start: 2024-01-31 | End: 2024-01-31

## 2024-01-31 RX ORDER — IBUPROFEN 800 MG/1
800 TABLET, FILM COATED ORAL EVERY 6 HOURS PRN
Qty: 30 TABLET | Refills: 0 | Status: SHIPPED | OUTPATIENT
Start: 2024-01-31

## 2024-01-31 RX ORDER — FENTANYL CITRATE 50 UG/ML
INJECTION, SOLUTION INTRAMUSCULAR; INTRAVENOUS PRN
Status: DISCONTINUED | OUTPATIENT
Start: 2024-01-31 | End: 2024-01-31

## 2024-01-31 RX ORDER — OXYCODONE HYDROCHLORIDE 5 MG/1
5 TABLET ORAL
Status: DISCONTINUED | OUTPATIENT
Start: 2024-01-31 | End: 2024-01-31 | Stop reason: HOSPADM

## 2024-01-31 RX ADMIN — ROCURONIUM BROMIDE 30 MG: 50 INJECTION, SOLUTION INTRAVENOUS at 14:05

## 2024-01-31 RX ADMIN — FENTANYL CITRATE 100 MCG: 50 INJECTION INTRAMUSCULAR; INTRAVENOUS at 14:04

## 2024-01-31 RX ADMIN — PROPOFOL 150 MG: 10 INJECTION, EMULSION INTRAVENOUS at 14:05

## 2024-01-31 RX ADMIN — SODIUM CHLORIDE, SODIUM LACTATE, POTASSIUM CHLORIDE, AND CALCIUM CHLORIDE: 600; 310; 30; 20 INJECTION, SOLUTION INTRAVENOUS at 13:16

## 2024-01-31 RX ADMIN — ROCURONIUM BROMIDE 10 MG: 50 INJECTION, SOLUTION INTRAVENOUS at 14:28

## 2024-01-31 RX ADMIN — OXYCODONE HYDROCHLORIDE 5 MG: 5 TABLET ORAL at 15:13

## 2024-01-31 RX ADMIN — DEXAMETHASONE SODIUM PHOSPHATE 4 MG: 4 INJECTION, SOLUTION INTRA-ARTICULAR; INTRALESIONAL; INTRAMUSCULAR; INTRAVENOUS; SOFT TISSUE at 14:12

## 2024-01-31 RX ADMIN — ONDANSETRON HYDROCHLORIDE 4 MG: 2 SOLUTION INTRAMUSCULAR; INTRAVENOUS at 14:12

## 2024-01-31 RX ADMIN — KETOROLAC TROMETHAMINE 30 MG: 30 INJECTION, SOLUTION INTRAMUSCULAR at 14:13

## 2024-01-31 RX ADMIN — MIDAZOLAM HYDROCHLORIDE 2 MG: 1 INJECTION, SOLUTION INTRAMUSCULAR; INTRAVENOUS at 14:04

## 2024-01-31 RX ADMIN — SUGAMMADEX 200 MG: 100 INJECTION, SOLUTION INTRAVENOUS at 14:36

## 2024-01-31 RX ADMIN — SODIUM CHLORIDE, SODIUM LACTATE, POTASSIUM CHLORIDE, AND CALCIUM CHLORIDE: 600; 310; 30; 20 INJECTION, SOLUTION INTRAVENOUS at 14:28

## 2024-01-31 RX ADMIN — ACETAMINOPHEN 975 MG: 325 TABLET, FILM COATED ORAL at 10:45

## 2024-01-31 RX ADMIN — LIDOCAINE HYDROCHLORIDE 40 MG: 20 INJECTION, SOLUTION INFILTRATION; PERINEURAL at 14:05

## 2024-01-31 ASSESSMENT — ACTIVITIES OF DAILY LIVING (ADL)
ADLS_ACUITY_SCORE: 31

## 2024-01-31 ASSESSMENT — LIFESTYLE VARIABLES: TOBACCO_USE: 1

## 2024-01-31 NOTE — INTERVAL H&P NOTE
I have reviewed the surgical (or preoperative) H&P that is linked to this encounter, and examined the patient. There are no significant changes    Clinical Conditions Present on Arrival:  Clinically Significant Risk Factors Present on Admission                  # Overweight: Estimated body mass index is 28.32 kg/m  as calculated from the following:    Height as of this encounter: 1.524 m (5').    Weight as of this encounter: 65.8 kg (145 lb).     Mandie Shook MD FACOG  OB/GYN  1/31/2024 12:25 PM

## 2024-01-31 NOTE — OR NURSING
Bebe has been discharged to home at 1545 via wheelchair accompanied by Yobany samuel.    Written discharge instructions were provided to patient, dad and grandfather.  Prescriptions were sent to [facility].      Patient and adult caring for them verbalize understanding of discharge instructions including no driving until tomorrow and no longer taking narcotic pain medications - no operating mechanical equipment and no making any important decisions.They understand reason for discharge, and necessary follow-up appointments.

## 2024-01-31 NOTE — OP NOTE
Gynecologic Operative Note   Bebe Carl  [id number]  1/31/2024    Preoperative Diagnosis:   Dysmenorrhea  Pelvic pain     Postoperative Diagnosis:   Normal pelvis     Procedure:   Diagnostic Laparoscopy    Surgeon: Mandie Shook MD      Anesthesia: general endotracheal     Complications: none     EBL: 5 mL     Findings: Upon entry of the abdomen with the laparoscope, no evidence of injury.  A survey of the upper abdomen showed RUQ adhesions of omentum to anterior abdominal wall and liver to anterior abdominal wall. Normal appearing pelvis. Normal appearing uterus without evidence of fibroids. Normal appearing ovaries bilaterally without evidence of cysts. Surgically absent fallopian tubes bilaterally. No evidence of endometriosis.     Indications: Ms. Carl is a 23 year old female who presented with complaint of dysmenorrhea and heavy menses s/p tubal ligation.  She declined a trial of hormonal menstrual suppression and desired diagnostic laparoscopy to look for evidence of endometriosis.  All risks, benefits and alternatives were discussed and written informed consent was obtained.     Technique:  The patient was taken to the operating room where she was placed in the dorsal lithotomy position with feet in yellow fin stirrups. General endotracheal anesthesia was administered. An exam under anesthesia was performed. The patient was then prepped and draped in the usual sterile fashion. A speculum was inserted into the vagina, a single toothed tenaculum placed on the cervix at 12 o'clock, and a uterine manipulator inserted into the cervical os. The speculum was removed. Attention was then turned to the abdomen where 0.25% bupivicaine was used to infiltrate the superior aspect of the umbilicus. An 11-blade scalpel was used to make a 5 mm incision in the umbilicus and a [place] used to expand the incision. A Veress needle was used to access the peritoneum through the umbilical incision. CO2 gas was attached to  the needle and opening pressure was less than 5 mmHg, and flow was increased to 20 L/min. Pneumoperitoneum was achieved with good tympany of the abdomen. A 5 mm trocar was used to place the first port. The 5 mm scope was placed in the port and visualized the abdomen which was free of any injury. Upper abdomen was explored with above findings. Attention was turned to the uterus, ovaries, and lower abdominal walls. All appeared normal with no indication of endometriosis, cysts, or fibroids. After thorough exploration the port was removed under direct visualization, pneumoperitoneum was expelled. The skin incisions were closed using 3-0 monocryl and dermabond. The speculum was reinserted into the vagina, the uterine manipulator removed, and the tenaculum removed from the cervix. Good hemostasis was noted.    Instrument, sponge, and needle counts were correct times 2.  The patient was extubated in the operating room and transferred to the PACU in stable condition.    Mandie Shook MD FACOG  OB/GYN  1/31/2024 2:46 PM

## 2024-01-31 NOTE — ANESTHESIA CARE TRANSFER NOTE
Patient: Bebe Carl    Procedure: Procedure(s):  Diagnostic LAPAROSCOPY       Diagnosis: Dysmenorrhea [N94.6]  Menorrhagia with regular cycle [N92.0]  Diagnosis Additional Information: No value filed.    Anesthesia Type:   General     Note:    Oropharynx: oropharynx clear of all foreign objects and spontaneously breathing  Level of Consciousness: drowsy  Oxygen Supplementation: room air    Independent Airway: airway patency satisfactory and stable  Dentition: dentition unchanged  Vital Signs Stable: post-procedure vital signs reviewed and stable  Report to RN Given: handoff report given  Patient transferred to: PACU    Handoff Report: Identifed the Patient, Identified the Reponsible Provider, Reviewed the pertinent medical history, Discussed the surgical course, Reviewed Intra-OP anesthesia mangement and issues during anesthesia, Set expectations for post-procedure period and Allowed opportunity for questions and acknowledgement of understanding      Vitals:  Vitals Value Taken Time   /71 01/31/24 1445   Temp     Pulse 95 01/31/24 1446   Resp 14 01/31/24 1446   SpO2 96 % 01/31/24 1446   Vitals shown include unfiled device data.    Electronically Signed By: BONNIE Mckeon CRNA  January 31, 2024  2:46 PM

## 2024-01-31 NOTE — OR NURSING
Pt states she ate a piece of sausage at 600 am.  CRNA informed and spoke with surgeon.  Okay to proceed with procedure after 2 pm.  Radha Tejeda RN on 1/31/2024 at 11:13 AM

## 2024-01-31 NOTE — DISCHARGE INSTRUCTIONS
.[facility]  Adult Discharge Orders & Instructions      For 24 hours after surgery:  Get plenty of rest.  A responsible adult must stay with you for at least 24 hours after you leave the hospital.   You may feel lightheaded.  IF so, sit for a few minutes before standing.  Have someone help you get up.   You may have a slight fever. Call the doctor if your fever is over 101 F (38.3 C) (taken under the tongue) or lasts longer than 24 hours.  You may have a dry mouth, a sore throat, muscle aches or trouble sleeping.  These should go away after 24 hours.  Do not make important or legal decisions.  6.   Do not drive or use heavy equipment.  If you have weakness or tingling, don't drive or use heavy equipment until this feeling goes away.                                                                                                                                                                         To contact a doctor, call    [phone]______________

## 2024-01-31 NOTE — ANESTHESIA PREPROCEDURE EVALUATION
Anesthesia Pre-Procedure Evaluation    Patient: Bebe Carl   MRN: [id number] : 2000        Procedure : Procedure(s):  Diagnostic LAPAROSCOPY          Past Medical History:   Diagnosis Date    Acute gastroenteritis 2019    ADHD (attention deficit hyperactivity disorder)     Anemia     following delivery     Bipolar 1 disorder (H)     Depression     Depressive disorder     History of blood transfusion     Postpartum depression     Viral gastroenteritis 2019      Past Surgical History:   Procedure Laterality Date    APPENDECTOMY      LAPAROSCOPIC SALPINGECTOMY Bilateral 2022    Procedure: SALPINGECTOMY, LAPAROSCOPIC;  Surgeon: Mandie Shook MD;  Location: [facility] OR      Allergies   Allergen Reactions    Sulfa Antibiotics Hives and Anaphylaxis    Atomoxetine GI Disturbance     Weight loss    Latex Rash     bandaides only      Social History     Tobacco Use    Smoking status: Every Day     Packs/day: 0.50     Years: 10.00     Additional pack years: 0.00     Total pack years: 5.00     Types: Cigarettes    Smokeless tobacco: Never   Substance Use Topics    Alcohol use: No      Wt Readings from Last 1 Encounters:   24 65.8 kg (145 lb)        Anesthesia Evaluation   Pt has had prior anesthetic.         ROS/MED HX  ENT/Pulmonary: Comment: Smokes 3-4 cigarettes per day    (+)                tobacco use, Current use,                       Neurologic:       Cardiovascular:  - neg cardiovascular ROS     METS/Exercise Tolerance: >4 METS    Hematologic:     (+)       history of blood transfusion,         Musculoskeletal:  - neg musculoskeletal ROS     GI/Hepatic:  - neg GI/hepatic ROS     Renal/Genitourinary:  - neg Renal ROS     Endo:  - neg endo ROS     Psychiatric/Substance Use:     (+) psychiatric history depression, bipolar and anxiety (ADHD. Pt admits to being a hypochondriac)   Recreational drug usage: Cannabis (gummies).    Infectious Disease:  - neg infectious disease ROS    "  Malignancy:  - neg malignancy ROS     Other:  - neg other ROS          Physical Exam    Airway        Mallampati: I   TM distance: > 3 FB   Neck ROM: full   Mouth opening: > 3 cm    Respiratory Devices and Support         Dental       (+) Completely normal teeth      Cardiovascular   cardiovascular exam normal          Pulmonary   pulmonary exam normal                OUTSIDE LABS:  CBC:   Lab Results   Component Value Date    WBC 5.0 11/15/2023    WBC 10.6 06/15/2022    HGB 14.3 11/15/2023    HGB 11.2 (L) 06/15/2022    HCT 41.5 11/15/2023    HCT 35.0 06/15/2022     11/15/2023     (H) 06/15/2022     BMP:   Lab Results   Component Value Date     06/15/2022     06/12/2022    POTASSIUM 4.3 06/15/2022    POTASSIUM 4.1 06/12/2022    CHLORIDE 106 06/15/2022    CHLORIDE 103 06/12/2022    CO2 24 06/15/2022    CO2 24 06/12/2022    BUN 6 (L) 06/15/2022    BUN 7 06/12/2022    CR 0.74 06/15/2022    CR 0.64 06/12/2022    GLC 73 08/11/2022    GLC 81 06/15/2022     COAGS: No results found for: \"PTT\", \"INR\", \"FIBR\"  POC:   Lab Results   Component Value Date    HCG Negative 01/31/2024     HEPATIC:   Lab Results   Component Value Date    ALBUMIN 3.8 06/15/2022    PROTTOTAL 6.9 06/15/2022    ALT 6 (L) 06/15/2022    AST 11 (L) 06/15/2022    ALKPHOS 92 06/15/2022    BILITOTAL 0.5 06/15/2022    BILIDIRECT 0.39 (H) 12/20/2016     OTHER:   Lab Results   Component Value Date    LACT 0.8 02/26/2020    NILSON 8.2 (L) 06/15/2022    MAG 2.5 06/15/2022    TSH 1.27 10/02/2023    CRP 68.1 (H) 06/12/2022       Anesthesia Plan    ASA Status:  1    NPO Status:  NPO Appropriate    Anesthesia Type: General.     - Airway: ETT              Consents    Anesthesia Plan(s) and associated risks, benefits, and realistic alternatives discussed. Questions answered and patient/representative(s) expressed understanding.     - Discussed: Risks, Benefits and Alternatives for BOTH SEDATION and the PROCEDURE were discussed     - Discussed " with:  Patient            Postoperative Care    Pain management: IV analgesics, Multi-modal analgesia.   PONV prophylaxis: Ondansetron (or other 5HT-3), Dexamethasone or Solumedrol     Comments:               BONNIE ARAGON CRNA    I have reviewed the pertinent notes and labs in the chart from the past 30 days and (re)examined the patient.  Any updates or changes from those notes are reflected in this note.              # Overweight: Estimated body mass index is 28.32 kg/m  as calculated from the following:    Height as of this encounter: 1.524 m (5').    Weight as of this encounter: 65.8 kg (145 lb).

## 2024-01-31 NOTE — OR NURSING
.PACU Transfer Note    Bebe Carl was transferred to  730 via bed.  Equipment used for transport:  bed.  Accompanied by:  Jennifer Edwards  Prescriptions were: sent to [facility].    PACU Respiratory Event Documentation     1) Episodes of Apnea greater than or equal to 10 seconds: no    2) Bradypnea - less than 8 breaths per minute: no    3) Pain score on 0 to 10 scale: 5/10 (states tolerable)    4) Pain-sedation mismatch (yes or no): no    5) Repeated 02 desaturation less than 90% (yes or no): no    Anesthesia notified? (yes or no): no    Any of the above events occuring repeatedly in separate 30 minute intervals may be considered recurrent PACU respiratory events.    Patient stable and meets phase 1 discharge criteria for transport from PACU.

## 2024-01-31 NOTE — ANESTHESIA POSTPROCEDURE EVALUATION
Patient: Bebe Carl    Procedure: Procedure(s):  Diagnostic LAPAROSCOPY       Anesthesia Type:  General    Note:  Disposition: Outpatient   Postop Pain Control: Uneventful            Sign Out: Well controlled pain   PONV: No   Neuro/Psych: Uneventful            Sign Out: Acceptable/Baseline neuro status   Airway/Respiratory: Uneventful            Sign Out: Acceptable/Baseline resp. status   CV/Hemodynamics: Uneventful            Sign Out: Acceptable CV status; No obvious hypovolemia; No obvious fluid overload   Other NRE: NONE   DID A NON-ROUTINE EVENT OCCUR?            Last vitals:  Vitals Value Taken Time   /68 01/31/24 1457   Temp 97.3  F (36.3  C) 01/31/24 1457   Pulse 89 01/31/24 1500   Resp 15 01/31/24 1500   SpO2 94 % 01/31/24 1500   Vitals shown include unfiled device data.    Electronically Signed By: BONNIE ARAGON CRNA  January 31, 2024  3:00 PM

## 2024-01-31 NOTE — ANESTHESIA PROCEDURE NOTES
Airway       Patient location during procedure: OR       Procedure Start/Stop Times: 1/31/2024 2:07 PM  Staff -        CRNA: Soham Sloan APRN CRNA       Performed By: CRNA  Consent for Airway        Urgency: elective  Indications and Patient Condition       Indications for airway management: frieda-procedural       Induction type:intravenous       Mask difficulty assessment: 1 - vent by mask    Final Airway Details       Final airway type: endotracheal airway       Successful airway: ETT - single  Endotracheal Airway Details        ETT size (mm): 7.0       Cuffed: yes       Cuff volume (mL): 4       Successful intubation technique: direct laryngoscopy       DL Blade Type: Cain 2       Grade View of Cords: 1       Position: Center       Measured from: gums/teeth       Secured at (cm): 21       Bite block used: None    Post intubation assessment        Placement verified by: capnometry, equal breath sounds and chest rise        Number of attempts at approach: 1       Number of other approaches attempted: 0       Secured with: tape       Ease of procedure: easy       Dentition: Intact and Unchanged    Medication(s) Administered   Medication Administration Time: 1/31/2024 2:07 PM

## 2024-02-01 ENCOUNTER — TELEPHONE (OUTPATIENT)
Dept: OBGYN | Facility: OTHER | Age: 24
End: 2024-02-01
Payer: MEDICAID

## 2024-02-01 NOTE — TELEPHONE ENCOUNTER
After verifying pt last name and date of birth, pt was told that her letter will only cover for a couple days and that it will be up at the unit 5 check in     Magaly Vicente RN on 2/1/2024 at 10:45 AM

## 2024-02-01 NOTE — TELEPHONE ENCOUNTER
Patient is wondering if there is a way she can receive a doctors note for work, perhaps via email?      Kd Archer on 2/1/2024 at 9:57 AM

## 2024-02-01 NOTE — LETTER
February 1, 2024      Bebe Carl  [id number] [address] [address]        To Whom It May Concern:    Bebe Carl  was seen on 1/31/2024.  Please excuse her  until 2/5/2024 due to surgery.    Sincerely,      Mandie Shook MD

## 2024-02-05 ENCOUNTER — TELEPHONE (OUTPATIENT)
Dept: OBGYN | Facility: OTHER | Age: 24
End: 2024-02-05
Payer: MEDICAID

## 2024-02-05 ENCOUNTER — HOSPITAL ENCOUNTER (EMERGENCY)
Facility: OTHER | Age: 24
Discharge: HOME | End: 2024-02-05
Attending: STUDENT IN AN ORGANIZED HEALTH CARE EDUCATION/TRAINING PROGRAM | Admitting: STUDENT IN AN ORGANIZED HEALTH CARE EDUCATION/TRAINING PROGRAM
Payer: MEDICAID

## 2024-02-05 VITALS
OXYGEN SATURATION: 99 % | HEART RATE: 114 BPM | BODY MASS INDEX: 28.32 KG/M2 | DIASTOLIC BLOOD PRESSURE: 65 MMHG | SYSTOLIC BLOOD PRESSURE: 112 MMHG | TEMPERATURE: 98.9 F | WEIGHT: 145 LBS | RESPIRATION RATE: 20 BRPM

## 2024-02-05 DIAGNOSIS — G89.18 POST-OPERATIVE PAIN: ICD-10-CM

## 2024-02-05 LAB
FLUAV RNA SPEC QL NAA+PROBE: NEGATIVE
FLUBV RNA RESP QL NAA+PROBE: NEGATIVE
GROUP A STREP BY PCR: NOT DETECTED
RSV RNA SPEC NAA+PROBE: NEGATIVE
SARS-COV-2 RNA RESP QL NAA+PROBE: NEGATIVE

## 2024-02-05 PROCEDURE — 87637 SARSCOV2&INF A&B&RSV AMP PRB: CPT | Performed by: FAMILY MEDICINE

## 2024-02-05 PROCEDURE — 99283 EMERGENCY DEPT VISIT LOW MDM: CPT | Performed by: STUDENT IN AN ORGANIZED HEALTH CARE EDUCATION/TRAINING PROGRAM

## 2024-02-05 PROCEDURE — 87651 STREP A DNA AMP PROBE: CPT | Performed by: STUDENT IN AN ORGANIZED HEALTH CARE EDUCATION/TRAINING PROGRAM

## 2024-02-05 PROCEDURE — 87637 SARSCOV2&INF A&B&RSV AMP PRB: CPT | Performed by: STUDENT IN AN ORGANIZED HEALTH CARE EDUCATION/TRAINING PROGRAM

## 2024-02-05 PROCEDURE — 87651 STREP A DNA AMP PROBE: CPT | Performed by: FAMILY MEDICINE

## 2024-02-05 ASSESSMENT — ACTIVITIES OF DAILY LIVING (ADL): ADLS_ACUITY_SCORE: 34
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
## 2024-02-05 NOTE — TELEPHONE ENCOUNTER
Patient had surgery last Wednesday. She is out of her oxycodone. She is taking ibuprofen and Tylenol, which are not doing a lot. She uses [facility] pharmacy. Please call.    Priyanka Weldon on 2/5/2024 at 3:49 PM
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
## 2024-02-09 ENCOUNTER — TELEPHONE (OUTPATIENT)
Dept: OBGYN | Facility: OTHER | Age: 24
End: 2024-02-09
Payer: MEDICAID
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
## 2024-02-15 ENCOUNTER — OFFICE VISIT (OUTPATIENT)
Dept: OBGYN | Facility: OTHER | Age: 24
End: 2024-02-15
Attending: OBSTETRICS & GYNECOLOGY
Payer: MEDICAID

## 2024-02-15 VITALS
BODY MASS INDEX: 30.86 KG/M2 | WEIGHT: 158 LBS | TEMPERATURE: 98 F | SYSTOLIC BLOOD PRESSURE: 118 MMHG | DIASTOLIC BLOOD PRESSURE: 70 MMHG | HEART RATE: 104 BPM

## 2024-02-15 DIAGNOSIS — Z98.890 S/P LAPAROSCOPY: ICD-10-CM

## 2024-02-15 DIAGNOSIS — N94.6 DYSMENORRHEA: Primary | ICD-10-CM

## 2024-02-15 PROCEDURE — 99213 OFFICE O/P EST LOW 20 MIN: CPT | Performed by: OBSTETRICS & GYNECOLOGY

## 2024-02-15 PROCEDURE — G0463 HOSPITAL OUTPT CLINIC VISIT: HCPCS | Performed by: OBSTETRICS & GYNECOLOGY

## 2024-02-15 RX ORDER — IBUPROFEN 600 MG/1
600 TABLET, FILM COATED ORAL EVERY 6 HOURS PRN
Qty: 40 TABLET | Refills: 0 | Status: SHIPPED | OUTPATIENT
Start: 2024-02-15

## 2024-02-15 RX ORDER — ACETAMINOPHEN 500 MG
500-1000 TABLET ORAL EVERY 6 HOURS PRN
Qty: 100 TABLET | Refills: 0 | Status: SHIPPED | OUTPATIENT
Start: 2024-02-15

## 2024-02-15 ASSESSMENT — PAIN SCALES - GENERAL: PAINLEVEL: EXTREME PAIN (8)
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
## 2024-02-15 NOTE — NURSING NOTE
No chief complaint on file.  Patient is here for follow up lap. Patient is still having pain, would like some more oxy as she is taking tylenol and IBU alternating.     Initial /70   Pulse 104   Temp 98  F (36.7  C)   Wt 71.7 kg (158 lb)   BMI 30.86 kg/m   Estimated body mass index is 30.86 kg/m  as calculated from the following:    Height as of 1/31/24: 1.524 m (5').    Weight as of this encounter: 71.7 kg (158 lb).  Medication Reconciliation: complete      Lizabeth Engel LPN

## 2024-08-29 NOTE — NURSING NOTE
"Chief Complaint   Patient presents with     Prenatal Care     13w1d       Initial /66 (BP Location: Right arm, Patient Position: Sitting, Cuff Size: Adult Regular)   Pulse 84   Wt 59 kg (130 lb)   LMP 04/22/2019 (Exact Date)   BMI 24.56 kg/m   Estimated body mass index is 24.56 kg/m  as calculated from the following:    Height as of 7/11/19: 1.549 m (5' 1\").    Weight as of this encounter: 59 kg (130 lb).  Medication Reconciliation: complete    Linh Rmoo LPN  " No (0)

## (undated) DEVICE — SU DERMABOND ADVANCED .7ML DNX12

## (undated) DEVICE — DRSG TEGADERM 4X4 3/4" 1626W

## (undated) DEVICE — ENDO SCOPE WARMER DUAL LAP TM500D

## (undated) DEVICE — PREP CHLORAPREP 26ML TINTED ORANGE  260815

## (undated) DEVICE — SLEEVE COMPRESSION SCD KNEE MED 74022

## (undated) DEVICE — GLOVE PROTEXIS POWDER FREE SMT 6.5  2D72PT65X

## (undated) DEVICE — SOL WATER 1500ML

## (undated) DEVICE — SU MONOCRYL 3-0 PS-2 27" Y427H

## (undated) DEVICE — ENDO TROCAR FIRST ENTRY KII FIOS ADV FIX 05X100MM CFF03

## (undated) DEVICE — TUBING INSUFFLATOR W/FILTER OLYMPUS WA95005A

## (undated) DEVICE — GLOVE PROTEXIS BLUE W/NEU-THERA 6.5  2D73EB65

## (undated) DEVICE — ENDO TROCAR FIRST ENTRY KII FIOS ADV FIX 12X100MM CFF73

## (undated) DEVICE — DECANTER VIAL 2006S

## (undated) DEVICE — VERRES NEEDLE 120MM DISPOSABLE 12/BX

## (undated) DEVICE — TRAY DRY SKIN PREP TRAY PREMIUM DYND70661

## (undated) DEVICE — ADH SKIN CLOSURE PREMIERPRO EXOFIN 1.0ML 3470

## (undated) DEVICE — DRAPE LAVH/LAPAROSCOPY W/POUCH 29474

## (undated) DEVICE — ESU LIGASURE REPROC LAPRSPC BLUNT TIP SEALER 5MMX37CM LF1637

## (undated) DEVICE — ESU GROUND PAD ADULT W/CORD E7507

## (undated) DEVICE — TROCAR KII SLEEVE 5MM X 100MM 12/BX

## (undated) DEVICE — COVER LIGHT HANDLE LT-F02

## (undated) DEVICE — PACK LAPAROSCOPY LF SBA15LPFCA

## (undated) DEVICE — ESU HOLDER LAP INST DISP PURPLE LONG 330MM H-PRO-330

## (undated) RX ORDER — DEXAMETHASONE SODIUM PHOSPHATE 4 MG/ML
INJECTION, SOLUTION INTRA-ARTICULAR; INTRALESIONAL; INTRAMUSCULAR; INTRAVENOUS; SOFT TISSUE
Status: DISPENSED
Start: 2022-08-11

## (undated) RX ORDER — ONDANSETRON 2 MG/ML
INJECTION INTRAMUSCULAR; INTRAVENOUS
Status: DISPENSED
Start: 2022-08-11

## (undated) RX ORDER — ONDANSETRON 2 MG/ML
INJECTION INTRAMUSCULAR; INTRAVENOUS
Status: DISPENSED
Start: 2024-01-31

## (undated) RX ORDER — OXYCODONE HYDROCHLORIDE 5 MG/1
TABLET ORAL
Status: DISPENSED
Start: 2024-01-31

## (undated) RX ORDER — DEXAMETHASONE SODIUM PHOSPHATE 4 MG/ML
INJECTION, SOLUTION INTRA-ARTICULAR; INTRALESIONAL; INTRAMUSCULAR; INTRAVENOUS; SOFT TISSUE
Status: DISPENSED
Start: 2024-01-31

## (undated) RX ORDER — SODIUM CHLORIDE, SODIUM LACTATE, POTASSIUM CHLORIDE, CALCIUM CHLORIDE 600; 310; 30; 20 MG/100ML; MG/100ML; MG/100ML; MG/100ML
INJECTION, SOLUTION INTRAVENOUS
Status: DISPENSED
Start: 2022-08-11

## (undated) RX ORDER — FENTANYL CITRATE 50 UG/ML
INJECTION, SOLUTION INTRAMUSCULAR; INTRAVENOUS
Status: DISPENSED
Start: 2023-11-15

## (undated) RX ORDER — METRONIDAZOLE 500 MG/1
TABLET ORAL
Status: DISPENSED
Start: 2019-08-15

## (undated) RX ORDER — ACETAMINOPHEN 325 MG/1
TABLET ORAL
Status: DISPENSED
Start: 2023-11-15

## (undated) RX ORDER — TETRACAINE HYDROCHLORIDE 5 MG/ML
SOLUTION OPHTHALMIC
Status: DISPENSED
Start: 2023-03-10

## (undated) RX ORDER — KETOROLAC TROMETHAMINE 30 MG/ML
INJECTION, SOLUTION INTRAMUSCULAR; INTRAVENOUS
Status: DISPENSED
Start: 2023-11-15

## (undated) RX ORDER — SODIUM CHLORIDE, SODIUM LACTATE, POTASSIUM CHLORIDE, CALCIUM CHLORIDE 600; 310; 30; 20 MG/100ML; MG/100ML; MG/100ML; MG/100ML
INJECTION, SOLUTION INTRAVENOUS
Status: DISPENSED
Start: 2019-12-23

## (undated) RX ORDER — FENTANYL CITRATE 50 UG/ML
INJECTION, SOLUTION INTRAMUSCULAR; INTRAVENOUS
Status: DISPENSED
Start: 2024-01-31

## (undated) RX ORDER — FENTANYL CITRATE 50 UG/ML
INJECTION, SOLUTION INTRAMUSCULAR; INTRAVENOUS
Status: DISPENSED
Start: 2020-01-26

## (undated) RX ORDER — ACETAMINOPHEN 325 MG/1
TABLET ORAL
Status: DISPENSED
Start: 2022-08-11

## (undated) RX ORDER — PROPOFOL 10 MG/ML
INJECTION, EMULSION INTRAVENOUS
Status: DISPENSED
Start: 2022-08-11

## (undated) RX ORDER — NICOTINE POLACRILEX 4 MG
LOZENGE BUCCAL
Status: DISPENSED
Start: 2019-12-08

## (undated) RX ORDER — ACETAMINOPHEN 500 MG
TABLET ORAL
Status: DISPENSED
Start: 2020-02-26

## (undated) RX ORDER — ACETAMINOPHEN 650 MG/1
SUPPOSITORY RECTAL
Status: DISPENSED
Start: 2019-12-23

## (undated) RX ORDER — KETOROLAC TROMETHAMINE 30 MG/ML
INJECTION, SOLUTION INTRAMUSCULAR; INTRAVENOUS
Status: DISPENSED
Start: 2024-01-31

## (undated) RX ORDER — DEXAMETHASONE SODIUM PHOSPHATE 4 MG/ML
INJECTION, SOLUTION INTRA-ARTICULAR; INTRALESIONAL; INTRAMUSCULAR; INTRAVENOUS; SOFT TISSUE
Status: DISPENSED
Start: 2023-11-15

## (undated) RX ORDER — ONDANSETRON 4 MG/1
TABLET, ORALLY DISINTEGRATING ORAL
Status: DISPENSED
Start: 2019-01-26

## (undated) RX ORDER — ACETAMINOPHEN 500 MG
TABLET ORAL
Status: DISPENSED
Start: 2022-06-12

## (undated) RX ORDER — BUPIVACAINE HYDROCHLORIDE 2.5 MG/ML
INJECTION, SOLUTION EPIDURAL; INFILTRATION; INTRACAUDAL
Status: DISPENSED
Start: 2023-11-15

## (undated) RX ORDER — OXYCODONE HYDROCHLORIDE 5 MG/1
TABLET ORAL
Status: DISPENSED
Start: 2019-01-26

## (undated) RX ORDER — DOXYCYCLINE 100 MG/1
CAPSULE ORAL
Status: DISPENSED
Start: 2021-10-13

## (undated) RX ORDER — SODIUM CHLORIDE 9 MG/ML
INJECTION, SOLUTION INTRAVENOUS
Status: DISPENSED
Start: 2020-01-27

## (undated) RX ORDER — METRONIDAZOLE 500 MG/1
TABLET ORAL
Status: DISPENSED
Start: 2019-08-29

## (undated) RX ORDER — SODIUM CHLORIDE 9 MG/ML
INJECTION, SOLUTION INTRAVENOUS
Status: DISPENSED
Start: 2022-05-03

## (undated) RX ORDER — BUPIVACAINE HYDROCHLORIDE 2.5 MG/ML
INJECTION, SOLUTION EPIDURAL; INFILTRATION; INTRACAUDAL
Status: DISPENSED
Start: 2024-01-31

## (undated) RX ORDER — LIDOCAINE HYDROCHLORIDE 20 MG/ML
INJECTION, SOLUTION EPIDURAL; INFILTRATION; INTRACAUDAL; PERINEURAL
Status: DISPENSED
Start: 2022-08-11

## (undated) RX ORDER — BUPIVACAINE HYDROCHLORIDE 2.5 MG/ML
INJECTION, SOLUTION EPIDURAL; INFILTRATION; INTRACAUDAL
Status: DISPENSED
Start: 2022-08-11

## (undated) RX ORDER — ONDANSETRON 2 MG/ML
INJECTION INTRAMUSCULAR; INTRAVENOUS
Status: DISPENSED
Start: 2023-11-15

## (undated) RX ORDER — SODIUM CHLORIDE 9 MG/ML
INJECTION, SOLUTION INTRAVENOUS
Status: DISPENSED
Start: 2019-12-08

## (undated) RX ORDER — PROPOFOL 10 MG/ML
INJECTION, EMULSION INTRAVENOUS
Status: DISPENSED
Start: 2023-11-15

## (undated) RX ORDER — KETOROLAC TROMETHAMINE 30 MG/ML
INJECTION, SOLUTION INTRAMUSCULAR; INTRAVENOUS
Status: DISPENSED
Start: 2022-08-11

## (undated) RX ORDER — FENTANYL CITRATE 50 UG/ML
INJECTION, SOLUTION INTRAMUSCULAR; INTRAVENOUS
Status: DISPENSED
Start: 2022-08-11

## (undated) RX ORDER — MEDROXYPROGESTERONE ACETATE 150 MG/ML
INJECTION, SUSPENSION INTRAMUSCULAR
Status: DISPENSED
Start: 2020-03-10

## (undated) RX ORDER — PROPOFOL 10 MG/ML
INJECTION, EMULSION INTRAVENOUS
Status: DISPENSED
Start: 2024-01-31

## (undated) RX ORDER — ACETAMINOPHEN 325 MG/1
TABLET ORAL
Status: DISPENSED
Start: 2024-01-31